# Patient Record
Sex: MALE | Race: WHITE | NOT HISPANIC OR LATINO | Employment: FULL TIME | URBAN - METROPOLITAN AREA
[De-identification: names, ages, dates, MRNs, and addresses within clinical notes are randomized per-mention and may not be internally consistent; named-entity substitution may affect disease eponyms.]

---

## 2017-04-03 ENCOUNTER — TELEPHONE (OUTPATIENT)
Dept: FAMILY MEDICINE | Facility: CLINIC | Age: 42
End: 2017-04-03

## 2017-04-03 ENCOUNTER — HOSPITAL ENCOUNTER (EMERGENCY)
Facility: CLINIC | Age: 42
Discharge: HOME | End: 2017-04-03
Attending: PHYSICIAN ASSISTANT | Admitting: PHYSICIAN ASSISTANT
Payer: COMMERCIAL

## 2017-04-03 VITALS
BODY MASS INDEX: 37.44 KG/M2 | WEIGHT: 247 LBS | OXYGEN SATURATION: 97 % | HEIGHT: 68 IN | HEART RATE: 81 BPM | SYSTOLIC BLOOD PRESSURE: 137 MMHG | TEMPERATURE: 99 F | RESPIRATION RATE: 20 BRPM | DIASTOLIC BLOOD PRESSURE: 83 MMHG

## 2017-04-03 DIAGNOSIS — S06.0X0A CONCUSSION WITHOUT LOSS OF CONSCIOUSNESS, INITIAL ENCOUNTER: ICD-10-CM

## 2017-04-03 PROCEDURE — 99282 EMERGENCY DEPT VISIT SF MDM: CPT

## 2017-04-03 ASSESSMENT — ENCOUNTER SYMPTOMS
WEAKNESS: 0
COLOR CHANGE: 1
NECK PAIN: 0
DECREASED CONCENTRATION: 1
NAUSEA: 0
NUMBNESS: 0
DIZZINESS: 0
FEVER: 0
VOMITING: 0
CONFUSION: 0
BACK PAIN: 0
CHILLS: 0
LIGHT-HEADEDNESS: 0
HEADACHES: 1
WOUND: 1
EYE PAIN: 0
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
## 2017-04-03 NOTE — ED AVS SNAPSHOT
Emergency Department    [address] [facility] [address]    Phone:  [phone]    Fax:  [phone]                                       Gage Carter   MRN: [id number]    Department:   Emergency Department   Date of Visit:  4/3/2017           Patient Information     Date Of Birth          1975        Your diagnoses for this visit were:     Concussion without loss of consciousness, initial encounter        You were seen by Kim Frazier PA-C.      Follow-up Information     Follow up with Lesli Hernandes MD In 3 days.    Specialty:  Family Practice    Contact information:    [address] [address] [phone]          Follow up with  Emergency Department.    Specialty:  EMERGENCY MEDICINE    Why:  If symptoms worsen    Contact information:    [address] [facility] [address] [phone]        Discharge Instructions       Discharge Instructions  Head Injury    You have been seen today for a head injury. You were checked for serious problems, like bleeding on the brain, but these problems cannot always be found right away.  Due to this risk, you should not be alone for 24 hours after your injury.  Follow up with your regular physician in 3-4 days. If you are taking a blood thinner, such as aspirin, Pradaxa  (dabigatran), Coumadin  (warfarin), or Plavix  (clopidogrel), you are at especially high risk for immediate or delayed bleeding, and need to re-check with a physician in 24 hours, or sooner if any of the symptoms below happen.     Return to the Emergency Department if:    You are confused, have amnesia, or you are not acting right.    Your headache gets worse or you start to have a really bad headache even with your recommended treatment plan.    You vomit more than once.    You have a convulsion or seizure.    You have trouble walking.    You have weakness or paralysis in an arm or a leg.    You have  blood or fluid coming from your ears or nose.    You have new symptoms or anything that worries you.    Sleeping:  It is okay for you to sleep, but someone should wake you up as instructed by your doctor, and someone should check on you at your usual time to wake up.     Activity:    Do not drive for at least 24 hours.    Do not drive if you have dizzy spells or trouble concentrating, or remembering things.    Do not return to any contact sports until cleared by your regular doctor.     Follow-up:  It is very important that you make an appointment with your clinic and go to the appointment.  If you do not follow-up with your regular doctor, it may result in missing an important development which could result in permanent injury or disability and/or lasting pain.  If there is any problem keeping your appointment, call your doctor or return to the Emergency Department.    MORE INFORMATION:    Concussion:  A concussion is a minor head injury that may cause temporary problems with the way your brain works.  Some symptoms include:  confusion, amnesia, nausea and vomiting, dizziness, fatigue, memory or concentration problems, irritability and sleep problems.    CT Scans: Your evaluation today may have included a CT scan (CAT scan) to look for things like bleeding or a skull fracture (break).  CT scans involve radiation and too many CT scans can cause serious health problems like cancer, especially in children.  Because of this, your doctor may not have ordered a CT scan today if they think you are at low risk for a serious or life threatening problem.    If you were given a prescription for medicine here today, be sure to read all of the information (including the package insert) that comes with your prescription.  This will include important information about the medicine, its side effects, and any warnings that you need to know about.  The pharmacist who fills the prescription can provide more information and answer  questions you may have about the medicine.  If you have questions or concerns that the pharmacist cannot address, please call or return to the Emergency Department.         Remember that you can always come back to the Emergency Department if you are not able to see your regular doctor in the amount of time listed above, if you get any new symptoms, or if there is anything that worries you.    Concussions  [facility]  What is a concussion?  A concussion is a mild traumatic brain injury (TBI) that occurs from a direct blow, bump or jolt to the head. It can also result from a blow to another part of the body when the force travels up to the head. A concussion can affect coordination, learning, memory and emotions.  Most concussions heal without issue. However, like any other injury, a brain injury should be given time to heal, which includes physical and mental rest (free from mental straining and visual stimuli like video miles and texting).  Signs and symptoms  Common signs and symptoms may include:    Altered mental status, including confusion, inappropriate emotions, agitation or abrupt change in personality    Amnesia/memory problems    Blurred vision, double vision, seeing stars or black spots    Dizziness, poor balance, or unsteadiness    Excessive or persistent headache    Excessive fatigue    Excessive sensitivity to light or loud noise    Feel  in a fog     Loss of consciousness or orientation    Poor balance/coordination    Ringing in ears    Vacant stare/glassy eyed    Vomiting  One of the most severe problems that can occur is bleeding inside the brain. If bleeding or swelling of the brain occurs, pressure in the skull rises and can cause brain injury. Bleeding may develop right after an injury or hours later, so monitoring symptoms is critical. This can be life threatening.    Why do a baseline computer test (ImPACT)?  Neurocognitive tests, such as ImPACT, provide information  about how the brain is responding to injury. ImPACT has two components: a pre- and post-concussion test. The pretest scoring represents one s baseline (normal) brain function.  The test is then repeated post injury. Results of the pre- and post-concussion tests are compared and a care plan is developed. If a pre-test was not completed prior to a concussion, a post-concussion test is still helpful in the assessment of brain function. An ImPACT test should be completed yearly due to the natural maturing of the brain.    What can I expect from the [facility] concussion program? [facility] providers will:    Facilitate completion of baseline tests    Manage concussion symptoms and make recommendations for return to previous activity level    Facilitate post-concussion testing    Refer to other health-care providers, as needed,  including neuropsychologists, neurologists and therapists who specialize in concussion management    When is it safe to return to activity?  A person should be free of symptoms and have returned  to normal sleeping and eating patterns as well as typical concentration levels before resuming activity. Once normal activities have resumed and there are no symptoms at rest, more demanding activities may be tried. Over time, activity will be increased as long as symptoms do not return. A gradual return to activities allows us the opportunity to assess brain healing.  Under no circumstances should anyone return to activity while experiencing concussion signs or symptoms.    For more information contact:  Concussion hotline: [phone]  To schedule an appointment: [phone]  Cost of computer testing (ImPACT)  Pre-concussion testing - $5  Post-concussion testing:    Option 1: $20 includes all post testing; excludes doctor review.    Option 2: [facility] doctor visit, including all post testing and review.      24 Hour Appointment Hotline       To make an appointment at any  [facility] call [phone] ([phone]). If you don't have a family doctor or clinic, we will help you find one. [facility] are conveniently located to serve the needs of you and your family.             Review of your medicines      Our records show that you are taking the medicines listed below. If these are incorrect, please call your family doctor or clinic.        Dose / Directions Last dose taken    simvastatin 10 MG tablet   Commonly known as:  ZOCOR   Dose:  10 mg   Quantity:  90 tablet        Take 1 tablet (10 mg) by mouth At Bedtime   Refills:  3                Orders Needing Specimen Collection     None      Pending Results     No orders found from 4/1/2017 to 4/4/2017.            Pending Culture Results     No orders found from 4/1/2017 to 4/4/2017.             Test Results from your hospital stay            Clinical Quality Measure: Blood Pressure Screening     Your blood pressure was checked while you were in the emergency department today. The last reading we obtained was  BP: 137/83 . Please read the guidelines below about what these numbers mean and what you should do about them.  If your systolic blood pressure (the top number) is less than 120 and your diastolic blood pressure (the bottom number) is less than 80, then your blood pressure is normal. There is nothing more that you need to do about it.  If your systolic blood pressure (the top number) is 120-139 or your diastolic blood pressure (the bottom number) is 80-89, your blood pressure may be higher than it should be. You should have your blood pressure rechecked within a year by a primary care provider.  If your systolic blood pressure (the top number) is 140 or greater or your diastolic blood pressure (the bottom number) is 90 or greater, you may have high blood pressure. High blood pressure is treatable, but if left untreated over time it can put you at risk for heart attack, stroke, or kidney failure. You should have  your blood pressure rechecked by a primary care provider within the next 4 weeks.  If your provider in the emergency department today gave you specific instructions to follow-up with your doctor or provider even sooner than that, you should follow that instruction and not wait for up to 4 weeks for your follow-up visit.        Thank you for choosing [facility]       Thank you for choosing [facility] for your care. Our goal is always to provide you with excellent care. Hearing back from our patients is one way we can continue to improve our services. Please take a few minutes to complete the written survey that you may receive in the mail after you visit with us. Thank you!        [facility] Information     [facility] gives you secure access to your electronic health record. If you see a primary care provider, you can also send messages to your care team and make appointments. If you have questions, please call your primary care clinic.  If you do not have a primary care provider, please call [phone] and they will assist you.        Care EveryWhere ID     This is your Care EveryWhere ID. This could be used by other organizations to access your [facility] medical records  [id number]        After Visit Summary       This is your record. Keep this with you and show to your community pharmacist(s) and doctor(s) at your next visit.
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
## 2017-04-03 NOTE — ED AVS SNAPSHOT
Emergency Department    [address] [place] [address]    Phone:  [phone]    Fax:  [phone]                                       Gage Carter   MRN: [id number]    Department:   Emergency Department   Date of Visit:  4/3/2017           After Visit Summary Signature Page     I have received my discharge instructions, and my questions have been answered. I have discussed any challenges I see with this plan with the nurse or doctor.    ..........................................................................................................................................  Patient/Patient Representative Signature      ..........................................................................................................................................  Patient Representative Print Name and Relationship to Patient    ..................................................               ................................................  Date                                            Time    ..........................................................................................................................................  Reviewed by Signature/Title    ...................................................              ..............................................  Date                                                            Time

## 2017-04-03 NOTE — DISCHARGE INSTRUCTIONS
Discharge Instructions  Head Injury    You have been seen today for a head injury. You were checked for serious problems, like bleeding on the brain, but these problems cannot always be found right away.  Due to this risk, you should not be alone for 24 hours after your injury.  Follow up with your regular physician in 3-4 days. If you are taking a blood thinner, such as aspirin, Pradaxa  (dabigatran), Coumadin  (warfarin), or Plavix  (clopidogrel), you are at especially high risk for immediate or delayed bleeding, and need to re-check with a physician in 24 hours, or sooner if any of the symptoms below happen.     Return to the Emergency Department if:    You are confused, have amnesia, or you are not acting right.    Your headache gets worse or you start to have a really bad headache even with your recommended treatment plan.    You vomit more than once.    You have a convulsion or seizure.    You have trouble walking.    You have weakness or paralysis in an arm or a leg.    You have blood or fluid coming from your ears or nose.    You have new symptoms or anything that worries you.    Sleeping:  It is okay for you to sleep, but someone should wake you up as instructed by your doctor, and someone should check on you at your usual time to wake up.     Activity:    Do not drive for at least 24 hours.    Do not drive if you have dizzy spells or trouble concentrating, or remembering things.    Do not return to any contact sports until cleared by your regular doctor.     Follow-up:  It is very important that you make an appointment with your clinic and go to the appointment.  If you do not follow-up with your regular doctor, it may result in missing an important development which could result in permanent injury or disability and/or lasting pain.  If there is any problem keeping your appointment, call your doctor or return to the Emergency Department.    MORE INFORMATION:    Concussion:  A concussion is a minor head  injury that may cause temporary problems with the way your brain works.  Some symptoms include:  confusion, amnesia, nausea and vomiting, dizziness, fatigue, memory or concentration problems, irritability and sleep problems.    CT Scans: Your evaluation today may have included a CT scan (CAT scan) to look for things like bleeding or a skull fracture (break).  CT scans involve radiation and too many CT scans can cause serious health problems like cancer, especially in children.  Because of this, your doctor may not have ordered a CT scan today if they think you are at low risk for a serious or life threatening problem.    If you were given a prescription for medicine here today, be sure to read all of the information (including the package insert) that comes with your prescription.  This will include important information about the medicine, its side effects, and any warnings that you need to know about.  The pharmacist who fills the prescription can provide more information and answer questions you may have about the medicine.  If you have questions or concerns that the pharmacist cannot address, please call or return to the Emergency Department.         Remember that you can always come back to the Emergency Department if you are not able to see your regular doctor in the amount of time listed above, if you get any new symptoms, or if there is anything that worries you.    Concussions  [facility]  What is a concussion?  A concussion is a mild traumatic brain injury (TBI) that occurs from a direct blow, bump or jolt to the head. It can also result from a blow to another part of the body when the force travels up to the head. A concussion can affect coordination, learning, memory and emotions.  Most concussions heal without issue. However, like any other injury, a brain injury should be given time to heal, which includes physical and mental rest (free from mental straining and visual stimuli  like video miles and texting).  Signs and symptoms  Common signs and symptoms may include:    Altered mental status, including confusion, inappropriate emotions, agitation or abrupt change in personality    Amnesia/memory problems    Blurred vision, double vision, seeing stars or black spots    Dizziness, poor balance, or unsteadiness    Excessive or persistent headache    Excessive fatigue    Excessive sensitivity to light or loud noise    Feel  in a fog     Loss of consciousness or orientation    Poor balance/coordination    Ringing in ears    Vacant stare/glassy eyed    Vomiting  One of the most severe problems that can occur is bleeding inside the brain. If bleeding or swelling of the brain occurs, pressure in the skull rises and can cause brain injury. Bleeding may develop right after an injury or hours later, so monitoring symptoms is critical. This can be life threatening.    Why do a baseline computer test (ImPACT)?  Neurocognitive tests, such as ImPACT, provide information about how the brain is responding to injury. ImPACT has two components: a pre- and post-concussion test. The pretest scoring represents one s baseline (normal) brain function.  The test is then repeated post injury. Results of the pre- and post-concussion tests are compared and a care plan is developed. If a pre-test was not completed prior to a concussion, a post-concussion test is still helpful in the assessment of brain function. An ImPACT test should be completed yearly due to the natural maturing of the brain.    What can I expect from the [facility] [facility] concussion program? [facility] ([facility]) providers will:    Facilitate completion of baseline tests    Manage concussion symptoms and make recommendations for return to previous activity level    Facilitate post-concussion testing    Refer to other health-care providers, as needed,  including neuropsychologists, neurologists and therapists who specialize in  concussion management    When is it safe to return to activity?  A person should be free of symptoms and have returned  to normal sleeping and eating patterns as well as typical concentration levels before resuming activity. Once normal activities have resumed and there are no symptoms at rest, more demanding activities may be tried. Over time, activity will be increased as long as symptoms do not return. A gradual return to activities allows us the opportunity to assess brain healing.  Under no circumstances should anyone return to activity while experiencing concussion signs or symptoms.    For more information contact:  Concussion hotline: [phone]  To schedule an appointment: [phone]  Cost of computer testing (ImPACT)  Pre-concussion testing - $5  Post-concussion testing:    Option 1: $20 includes all post testing; excludes doctor review.    Option 2: [facility] doctor visit, including all post testing and review.

## 2017-04-06 ENCOUNTER — TELEPHONE (OUTPATIENT)
Dept: FAMILY MEDICINE | Facility: CLINIC | Age: 42
End: 2017-04-06

## 2017-04-06 NOTE — TELEPHONE ENCOUNTER
Patient called requesting advise if ok to wait next week for hospital follow up. Notes he has been having trouble remembering some things like door code at work. Advise he schedule appt this week for follow up. appt was scheduled.

## 2017-04-07 ENCOUNTER — OFFICE VISIT (OUTPATIENT)
Dept: FAMILY MEDICINE | Facility: CLINIC | Age: 42
End: 2017-04-07
Payer: COMMERCIAL

## 2017-04-07 ENCOUNTER — TELEPHONE (OUTPATIENT)
Dept: FAMILY MEDICINE | Facility: CLINIC | Age: 42
End: 2017-04-07

## 2017-04-07 VITALS
WEIGHT: 241 LBS | HEART RATE: 60 BPM | BODY MASS INDEX: 36.53 KG/M2 | HEIGHT: 68 IN | DIASTOLIC BLOOD PRESSURE: 82 MMHG | TEMPERATURE: 98.2 F | SYSTOLIC BLOOD PRESSURE: 120 MMHG

## 2017-04-07 DIAGNOSIS — S05.91XS RIGHT EYE INJURY, SEQUELA: ICD-10-CM

## 2017-04-07 DIAGNOSIS — F07.81 POSTCONCUSSION SYNDROME: Primary | ICD-10-CM

## 2017-04-07 DIAGNOSIS — S06.0X0S CONCUSSION WITHOUT LOSS OF CONSCIOUSNESS, SEQUELA (H): ICD-10-CM

## 2017-04-07 PROCEDURE — 99214 OFFICE O/P EST MOD 30 MIN: CPT | Performed by: PHYSICIAN ASSISTANT

## 2017-04-07 NOTE — NURSING NOTE
"Chief Complaint   Patient presents with     ER F/U       Initial /82  Pulse 60  Temp 98.2  F (36.8  C) (Tympanic)  Ht 5' 8\" (1.727 m)  Wt 241 lb (109.3 kg)  BMI 36.64 kg/m2 Estimated body mass index is 36.64 kg/(m^2) as calculated from the following:    Height as of this encounter: 5' 8\" (1.727 m).    Weight as of this encounter: 241 lb (109.3 kg).  BP completed using cuff size: large    Health Maintenance Due   Topic Date Due     PHQ-9 Q6 MONTHS (NO INBASKET)  01/13/2017         Kya Mackay MA 4/07/17        "

## 2017-04-07 NOTE — TELEPHONE ENCOUNTER
Patient called requesting advise on work restrictions. After pt reviewed provider note, he asked nurse to disregard message. He will keep provider's note as written.

## 2017-04-07 NOTE — MR AVS SNAPSHOT
After Visit Summary   4/7/2017    Gage Carter    MRN: [id number]           Patient Information     Date Of Birth          1975        Visit Information        Provider Department      4/7/2017 8:50 AM Ana Paredes PA-C [facility]        Today's Diagnoses     Right eye injury, sequela    -  1    Postconcussion syndrome        Concussion without loss of consciousness, sequela (H)           Follow-ups after your visit        Additional Services     CONCUSSION  REFERRAL       [facility] is referring you to the Concussion  service at [facility].      The  Representative will assist you in the coordination of your concussion care as prescribed by your physician.    The  Representative will contact you within one business day, or you may contact the  Representative at [phone].    Referral Options:  Rehab Services: Occupational Therapy, Evaluate and Treat    Coverage of these services are subject to the terms and limitations of your health insurance plan.  Please call member services at your health plan with any benefit or coverage questions.     If X-rays, CT or MRI's have been performed, please contact the facility where they were done, to arrange for  prior to your scheduled appointment.  Please bring this referral request to your appointment and present it to your specialist.            OPHTHALMOLOGY ADULT REFERRAL       Your provider has referred you to: [facility]: Eye [facility] [phone]   [email or web address]    Please be aware that coverage of these services is subject to the terms and limitations of your health insurance plan.  Call member services at your health plan with any benefit or coverage questions.      Please bring the following with you to your appointment:    (1) Any X-Rays, CTs or MRIs which have been  "performed.  Contact the facility where they were done to arrange for  prior to your scheduled appointment.    (2) List of current medications  (3) This referral request   (4) Any documents/labs given to you for this referral                  Who to contact     If you have questions or need follow up information about today's clinic visit or your schedule please contact [facility] directly at [phone].  Normal or non-critical lab and imaging results will be communicated to you by [email or web address], letter or phone within 4 business days after the clinic has received the results. If you do not hear from us within 7 days, please contact the clinic through Be[employer] Inter[employer] or phone. If you have a critical or abnormal lab result, we will notify you by phone as soon as possible.  Submit refill requests through Zeomatrix or call your pharmacy and they will forward the refill request to us. Please allow 3 business days for your refill to be completed.          Additional Information About Your Visit        [email or web address] Information     Zeomatrix gives you secure access to your electronic health record. If you see a primary care provider, you can also send messages to your care team and make appointments. If you have questions, please call your primary care clinic.  If you do not have a primary care provider, please call [phone] and they will assist you.        Care EveryWhere ID     This is your Care EveryWhere ID. This could be used by other organizations to access your [facility] medical records  [id number]        Your Vitals Were     Pulse Temperature Height BMI (Body Mass Index)          60 98.2  F (36.8  C) (Tympanic) 5' 8\" (1.727 m) 36.64 kg/m2         Blood Pressure from Last 3 Encounters:   04/07/17 120/82   04/03/17 137/83   07/13/16 110/64    Weight from Last 3 Encounters:   04/07/17 241 lb (109.3 kg)   04/03/17 247 lb (112 kg)   07/13/16 224 lb (101.6 kg)              We Performed the " Following     CONCUSSION  REFERRAL     OPHTHALMOLOGY ADULT REFERRAL        Primary Care Provider Office Phone # Fax #    Lesli Hernandes [phone]       [address] [address]        Thank you!     Thank you for choosing [facility]  for your care. Our goal is always to provide you with excellent care. Hearing back from our patients is one way we can continue to improve our services. Please take a few minutes to complete the written survey that you may receive in the mail after your visit with us. Thank you!             Your Updated Medication List - Protect others around you: Learn how to safely use, store and throw away your medicines at www.disposemymeds.org.          This list is accurate as of: 4/7/17  9:37 AM.  Always use your most recent med list.                   Brand Name Dispense Instructions for use    simvastatin 10 MG tablet    ZOCOR    90 tablet    Take 1 tablet (10 mg) by mouth At Bedtime

## 2017-04-07 NOTE — LETTER
[address]  [address]  [facility] [address]  Phone: [phone]  Fax: [phone]    April 7, 2017        Gage Carter  [address] [place] [address]          To whom it may concern:    RE: Gage Carter    Patient was seen and treated today at our clinic.    Patient to remain off work through 4/16/2017.    Upon return to work on 4/17/2017, the following restrictions are in place:  - No more than 30 consecutive minutes of work, followed by 30 consecutive minutes of rest (no screen time or reading, quiet room).  - No extra-curricular activities or attending events after normal work hours  - Patient may require additional assistance to complete reading or screen time tasks (i.e delegating work to others, deadline extension) so he doesn't exceed reading/screen time for 30 consecutive minutes.    Next assessment planned the week of 4/24/2017, work restrictions to be updated at that time.    Please contact me for questions or concerns.      Sincerely,        Ana Paredes PA-C
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
## 2017-04-11 ENCOUNTER — MYC MEDICAL ADVICE (OUTPATIENT)
Dept: FAMILY MEDICINE | Facility: CLINIC | Age: 42
End: 2017-04-11

## 2017-04-12 ENCOUNTER — HOSPITAL ENCOUNTER (OUTPATIENT)
Dept: OCCUPATIONAL THERAPY | Facility: CLINIC | Age: 42
Setting detail: THERAPIES SERIES
End: 2017-04-12
Attending: PHYSICIAN ASSISTANT
Payer: COMMERCIAL

## 2017-04-12 ENCOUNTER — OFFICE VISIT (OUTPATIENT)
Dept: OPHTHALMOLOGY | Facility: CLINIC | Age: 42
End: 2017-04-12
Attending: OPHTHALMOLOGY
Payer: COMMERCIAL

## 2017-04-12 DIAGNOSIS — F07.81 CONCUSSION SYNDROME: Primary | ICD-10-CM

## 2017-04-12 DIAGNOSIS — S05.91XA: ICD-10-CM

## 2017-04-12 PROCEDURE — 92083 EXTENDED VISUAL FIELD XM: CPT | Mod: ZF | Performed by: OPHTHALMOLOGY

## 2017-04-12 PROCEDURE — 99212 OFFICE O/P EST SF 10 MIN: CPT | Mod: ZF

## 2017-04-12 PROCEDURE — 40000768 ZZHC STATISTIC OT CONCUSSION VISIT: Performed by: OCCUPATIONAL THERAPIST

## 2017-04-12 PROCEDURE — 97535 SELF CARE MNGMENT TRAINING: CPT | Mod: GO | Performed by: OCCUPATIONAL THERAPIST

## 2017-04-12 ASSESSMENT — VISUAL ACUITY
OD_SC+: +2
OD_SC: 20/30
OS_SC: 20/20
METHOD: SNELLEN - LINEAR
OD_PH_SC: 20/20-2

## 2017-04-12 ASSESSMENT — TONOMETRY
OS_IOP_MMHG: 18
IOP_METHOD: TONOPEN
OD_IOP_MMHG: 22

## 2017-04-12 ASSESSMENT — CONF VISUAL FIELD
METHOD: COUNTING FINGERS
OS_NORMAL: 1
OD_NORMAL: 1

## 2017-04-12 ASSESSMENT — SLIT LAMP EXAM - LIDS: COMMENTS: NORMAL

## 2017-04-12 ASSESSMENT — CUP TO DISC RATIO
OS_RATIO: 0.3
OD_RATIO: 0.3

## 2017-04-12 ASSESSMENT — EXTERNAL EXAM - RIGHT EYE: OD_EXAM: NORMAL

## 2017-04-12 ASSESSMENT — EXTERNAL EXAM - LEFT EYE: OS_EXAM: NORMAL
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
## 2017-04-12 NOTE — MR AVS SNAPSHOT
After Visit Summary   4/12/2017    Gage Carter    MRN: [id number]           Patient Information     Date Of Birth          1975        Visit Information        Provider Department      4/12/2017 1:00 PM Emily Iverson MD [facility]        Today's Diagnoses     Concussion syndrome    -  1    Trauma to right eye           Follow-ups after your visit        Follow-up notes from your care team     Return if symptoms worsen or fail to improve.      Your next 10 appointments already scheduled     Apr 28, 2017  9:30 AM CDT   [facility] with Ana Paredes PA-C   [facility]    [address] [place] [address] [phone]              Who to contact     Please call your clinic at [phone] to:    Ask questions about your health    Make or cancel appointments    Discuss your medicines    Learn about your test results    Speak to your doctor   If you have compliments or concerns about an experience at your clinic, or if you wish to file a complaint, please contact [facility] Patient Relations at [phone] or email us at [email or web address]         Additional Information About Your Visit        MyChart Information     DeepDyvet gives you secure access to your electronic health record. If you see a primary care provider, you can also send messages to your care team and make appointments. If you have questions, please call your primary care clinic.  If you do not have a primary care provider, please call [phone] and they will assist you.      DeepDyvet is an electronic gateway that provides easy, online access to your medical records. With PublicBeta, you can request a clinic appointment, read your test results, renew a prescription or communicate with your care team.     To access your existing account, please contact your [facility] or call [phone] for assistance.        Care EveryWhere ID     This is your Care EveryWhere ID. This could be used by other organizations to access your [facility] medical records  [id number]         Blood Pressure from Last 3 Encounters:   04/07/17 120/82   04/03/17 137/83   07/13/16 110/64    Weight from Last 3 Encounters:   04/07/17 109.3 kg (241 lb)   04/03/17 112 kg (247 lb)   07/13/16 101.6 kg (224 lb)              We Performed the Following     Glaucoma Top OU        Primary Care Provider Office Phone # Fax #    Lesli Hernandes [phone]       [address] [address]        Thank you!     Thank you for choosing EYE CLINIC  for your care. Our goal is always to provide you with excellent care. Hearing back from our patients is one way we can continue to improve our services. Please take a few minutes to complete the written survey that you may receive in the mail after your visit with us. Thank you!             Your Updated Medication List - Protect others around you: Learn how to safely use, store and throw away your medicines at www.disposemymeds.org.          This list is accurate as of: 4/12/17  2:37 PM.  Always use your most recent med list.                   Brand Name Dispense Instructions for use    simvastatin 10 MG tablet    ZOCOR    90 tablet    Take 1 tablet (10 mg) by mouth At Bedtime
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
## 2017-04-14 ENCOUNTER — MYC MEDICAL ADVICE (OUTPATIENT)
Dept: FAMILY MEDICINE | Facility: CLINIC | Age: 42
End: 2017-04-14

## 2017-04-14 NOTE — TELEPHONE ENCOUNTER
Thanks Linsey.  Noted, looks like this is all for JOSE Paredes and just an update for me.  I'll forward to her as well.  No need for follow up.  Thanks,  Lesli Hernandes MD

## 2017-04-17 ENCOUNTER — TELEPHONE (OUTPATIENT)
Dept: FAMILY MEDICINE | Facility: CLINIC | Age: 42
End: 2017-04-17

## 2017-04-17 NOTE — TELEPHONE ENCOUNTER
Patient is calling regarding the recent accommodations that he set with Ana at the last appointment he had with her.     States 'the on for 30 minutes off for 30 minutes does not work for me, the environment is entirely too noisy, I found myself in a fog and I had to go out to my car to recover, would like to talk to Ana about this and see if there is a different accomodation that can be made'   Patient was hyper verbal and wanted to be seen earlier than the 25th. Scheduled an appointment for him to see Ana on Wednesday.    Tati Yoon RN  04/17/17  12:42 PM

## 2017-04-18 ENCOUNTER — MYC MEDICAL ADVICE (OUTPATIENT)
Dept: FAMILY MEDICINE | Facility: CLINIC | Age: 42
End: 2017-04-18

## 2017-04-18 NOTE — TELEPHONE ENCOUNTER
Please see My Chart message below and advise as appropriate.    Linsey Rae RN  [facility] [employer]

## 2017-04-19 ENCOUNTER — MYC MEDICAL ADVICE (OUTPATIENT)
Dept: FAMILY MEDICINE | Facility: CLINIC | Age: 42
End: 2017-04-19

## 2017-04-19 ENCOUNTER — OFFICE VISIT (OUTPATIENT)
Dept: FAMILY MEDICINE | Facility: CLINIC | Age: 42
End: 2017-04-19
Payer: COMMERCIAL

## 2017-04-19 ENCOUNTER — TELEPHONE (OUTPATIENT)
Dept: FAMILY MEDICINE | Facility: CLINIC | Age: 42
End: 2017-04-19

## 2017-04-19 VITALS
DIASTOLIC BLOOD PRESSURE: 72 MMHG | SYSTOLIC BLOOD PRESSURE: 122 MMHG | HEART RATE: 62 BPM | OXYGEN SATURATION: 96 % | HEIGHT: 68 IN | RESPIRATION RATE: 16 BRPM | WEIGHT: 243 LBS | TEMPERATURE: 97.1 F | BODY MASS INDEX: 36.83 KG/M2

## 2017-04-19 DIAGNOSIS — F33.1 MODERATE EPISODE OF RECURRENT MAJOR DEPRESSIVE DISORDER (H): ICD-10-CM

## 2017-04-19 DIAGNOSIS — S06.0X0S CONCUSSION WITHOUT LOSS OF CONSCIOUSNESS, SEQUELA (H): Primary | ICD-10-CM

## 2017-04-19 DIAGNOSIS — F41.1 GENERALIZED ANXIETY DISORDER: ICD-10-CM

## 2017-04-19 PROCEDURE — 99215 OFFICE O/P EST HI 40 MIN: CPT | Performed by: PHYSICIAN ASSISTANT

## 2017-04-19 NOTE — NURSING NOTE
"No chief complaint on file.      Initial /72  Pulse 62  Temp 97.1  F (36.2  C) (Tympanic)  Resp 16  Ht 5' 8\" (1.727 m)  Wt 243 lb (110.2 kg)  SpO2 96%  BMI 36.95 kg/m2 Estimated body mass index is 36.95 kg/(m^2) as calculated from the following:    Height as of this encounter: 5' 8\" (1.727 m).    Weight as of this encounter: 243 lb (110.2 kg).  Medication Reconciliation: complete   .Mer BARNETT      "
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
## 2017-04-19 NOTE — MR AVS SNAPSHOT
After Visit Summary   4/19/2017    Gage Carter    MRN: [id number]           Patient Information     Date Of Birth          1975        Visit Information        Provider Department      4/19/2017 7:10 AM Ana Paredes PA-C [facility]        Today's Diagnoses     Concussion without loss of consciousness, sequela (H)    -  1       Follow-ups after your visit        Your next 10 appointments already scheduled     Apr 25, 2017  2:30 PM CDT   Treatment 45 with Aggie Parrish OT   [facility]    [address] [place] [address] [address]  [facility] [address]   [phone]            Apr 28, 2017  9:30 AM CDT   SHORT with Ana Paredes PA-C   [facility] ([facility])    [address]  [address]  [facility] [address]   [phone]            May 01, 2017  2:30 PM CDT   Treatment 45 with Aggie Parrish OT   [facility] ([facility])    [address] [place] [address] [address]  [facility] [address]   [phone]            May 08, 2017  2:30 PM CDT   Treatment 45 with Aggie Parrish OT   [facility] ([facility])    [address] [place] [address] [address]  [facility] [address]   [phone]            May 15, 2017  2:30 PM CDT   Treatment 45 with Aggie Parrish [facility]   [facility]    [address] [place] [address]  [facility] [address]   [phone]              Who to contact     If you have questions or need follow up information about today's clinic visit or your schedule please contact [facility] directly at [phone].  Normal or non-critical lab and imaging results will be communicated to you by MyChart, letter or phone within 4 business days after the clinic has received the results. If you do  "not hear from us within 7 days, please contact the clinic through [employer] or phone. If you have a critical or abnormal lab result, we will notify you by phone as soon as possible.  Submit refill requests through [employer] or call your pharmacy and they will forward the refill request to us. Please allow 3 business days for your refill to be completed.          Additional Information About Your Visit        [employer] Information     [employer] gives you secure access to your electronic health record. If you see a primary care provider, you can also send messages to your care team and make appointments. If you have questions, please call your primary care clinic.  If you do not have a primary care provider, please call [phone] and they will assist you.        Care EveryWhere ID     This is your Care EveryWhere ID. This could be used by other organizations to access your [place] medical records  [id number]        Your Vitals Were     Pulse Temperature Respirations Height Pulse Oximetry BMI (Body Mass Index)    62 97.1  F (36.2  C) (Tympanic) 16 5' 8\" (1.727 m) 96% 36.95 kg/m2       Blood Pressure from Last 3 Encounters:   04/19/17 122/72   04/07/17 120/82   04/03/17 137/83    Weight from Last 3 Encounters:   04/19/17 243 lb (110.2 kg)   04/07/17 241 lb (109.3 kg)   04/03/17 247 lb (112 kg)              Today, you had the following     No orders found for display       Primary Care Provider Office Phone # Fax #    Lesli Hernandes [phone]       [address] [address]        Thank you!     Thank you for choosing [facility]  for your care. Our goal is always to provide you with excellent care. Hearing back from our patients is one way we can continue to improve our services. Please take a few minutes to complete the written survey that you may receive in the mail after your visit with us. Thank you!             Your Updated " Medication List - Protect others around you: Learn how to safely use, store and throw away your medicines at www.disposemymeds.org.          This list is accurate as of: 4/19/17  7:48 AM.  Always use your most recent med list.                   Brand Name Dispense Instructions for use    simvastatin 10 MG tablet    ZOCOR    90 tablet    Take 1 tablet (10 mg) by mouth At Bedtime

## 2017-04-19 NOTE — TELEPHONE ENCOUNTER
Reason for Call: Request for an order or referral:    Order or referral being requested: referral to concussion care clinic    Date needed: as soon as possible    Has the patient been seen by the PCP for this problem? YES    Additional comments: Pt had appt with JOSE Paredes today and talked to Dr Hernandes via [employer].    Phone number Patient can be reached at:  Cell number on file:    Telephone Information:   Mobile [phone]       Best Time:  any    Can we leave a detailed message on this number?  YES    Call taken on 4/19/2017 at 8:23 AM by SAHARA ZHNAG

## 2017-04-20 ASSESSMENT — PATIENT HEALTH QUESTIONNAIRE - PHQ9: SUM OF ALL RESPONSES TO PHQ QUESTIONS 1-9: 11

## 2017-04-21 ENCOUNTER — MYC MEDICAL ADVICE (OUTPATIENT)
Dept: FAMILY MEDICINE | Facility: CLINIC | Age: 42
End: 2017-04-21

## 2017-04-24 ENCOUNTER — MYC MEDICAL ADVICE (OUTPATIENT)
Dept: FAMILY MEDICINE | Facility: CLINIC | Age: 42
End: 2017-04-24

## 2017-04-25 NOTE — TELEPHONE ENCOUNTER
Hi RNs:  Could you call Gage and kindly let him know that he needs an appointment to discuss this?  It's too much info for us to manage from [employer] - [employer] is really meant for quick messages and communication only.  Or if he was just sending as an FYI you can tell him that I saw his message.  Thanks,  Lesli Hernandes MD

## 2017-04-25 NOTE — TELEPHONE ENCOUNTER
Called patient and spoke to him he is meeting with the concussion clinic tomorrow.  Tati Yoon RN  04/25/17  4:16 PM

## 2017-04-26 ENCOUNTER — OFFICE VISIT (OUTPATIENT)
Dept: SURGERY | Facility: CLINIC | Age: 42
End: 2017-04-26

## 2017-04-26 VITALS
HEIGHT: 68 IN | SYSTOLIC BLOOD PRESSURE: 136 MMHG | HEART RATE: 73 BPM | BODY MASS INDEX: 36.78 KG/M2 | DIASTOLIC BLOOD PRESSURE: 86 MMHG | OXYGEN SATURATION: 97 % | WEIGHT: 242.7 LBS | TEMPERATURE: 98.7 F

## 2017-04-26 DIAGNOSIS — S06.0X0A CONCUSSION, WITHOUT LOSS OF CONSCIOUSNESS, INITIAL ENCOUNTER: Primary | ICD-10-CM

## 2017-04-26 ASSESSMENT — ANXIETY QUESTIONNAIRES
2. NOT BEING ABLE TO STOP OR CONTROL WORRYING: NOT AT ALL
6. BECOMING EASILY ANNOYED OR IRRITABLE: NEARLY EVERY DAY
5. BEING SO RESTLESS THAT IT IS HARD TO SIT STILL: NOT AT ALL
GAD7 TOTAL SCORE: 8
1. FEELING NERVOUS, ANXIOUS, OR ON EDGE: SEVERAL DAYS
3. WORRYING TOO MUCH ABOUT DIFFERENT THINGS: SEVERAL DAYS
IF YOU CHECKED OFF ANY PROBLEMS ON THIS QUESTIONNAIRE, HOW DIFFICULT HAVE THESE PROBLEMS MADE IT FOR YOU TO DO YOUR WORK, TAKE CARE OF THINGS AT HOME, OR GET ALONG WITH OTHER PEOPLE: VERY DIFFICULT
7. FEELING AFRAID AS IF SOMETHING AWFUL MIGHT HAPPEN: SEVERAL DAYS

## 2017-04-26 ASSESSMENT — PAIN SCALES - GENERAL: PAINLEVEL: MODERATE PAIN (4)

## 2017-04-26 ASSESSMENT — PATIENT HEALTH QUESTIONNAIRE - PHQ9: 5. POOR APPETITE OR OVEREATING: MORE THAN HALF THE DAYS
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
## 2017-04-26 NOTE — MR AVS SNAPSHOT
After Visit Summary   4/26/2017    Gage Carter    MRN: [id number]           Patient Information     Date Of Birth          1975        Visit Information        Provider Department      4/26/2017 4:00 PM Oralia Barlow NP [facility]        Today's Diagnoses     Concussion, without loss of consciousness, initial encounter    -  1      Care Instructions    GENERAL ADVICE  ~~ Avoid activities that trigger your symptoms.  Stop your activity  as soon as you feel the symptoms coming on, or earlier if you feel your symptoms coming on.  ~~ Do not try to push through symptoms or they may get worse or take longer to go away.  ~~ Allow yourself more time for activity   ~~ Drink plenty of water throughout the day (8-10 glasses per day).   ~~ Avoid alcohol and caffeine  ~~ Write things down.  At home, at work, whenever there is something that you should remember, even it is simple.    SLEEP  ~~ You need to sleep.   ~~ If your headaches are keeping you awake take your ibuprofen and tylenol right before bedtime~  ~~ Attempt for 8 hours of sleep a night. If you are having issues sleeping at night, avoid napping during the day.  ~~ Rest (eyes closed, dark room)  as frequently as needed to let your symptoms go away    SCREEN  ~ Look into justgetflux.com to adjust your screen resolutions~  ~ Increase font.   ~ limit screen activities including computer, TV, e-readers and phones.  ~ Avoid reading if it increases your symptoms.  Audio- books are a great option often available at your public library.    HEADACHE  ~ Take your tylenol (1000 mg) three times a day for now, do not wait until you have a headache~  ~ Take your ibuprofen (600 mg) three times a day now, do not wait until you have a headache (take with food)~  Light sensitivities:   ~~ Avoid florescent lighting when possible  ~~ Always wear sunglasses outside and even wear them indoors if it is helpful.  ~~ No night driving.   Sound  sensitivities:  ~ Avoid crowded areas  ~ Avoid multiple conversations at the same time around you  ~ Avoid loud music or loud sounds    NECK PAIN  ~ Ice or Heat are good.   ~ Massage is ok if it doesn't trigger more symptoms.   ~ Gentle stretches are good.     DIZZINESS  ~ Minimal daytime driving, no night driving, no drive way driving.   ~ Keep a chair at the side of the bed to help get steady prior to getting up and going to the bathroom.   ~ No climbing heights or using ladders or stepstools.    FATIGUE  ~ Go on walks, clean the house, gentle activities, but when you get fatigued, stop and rest.     ANXIETY OR MOOD SWINGS:  ~~ If you are irritable or anxious, take a break in a quiet room.        You have been referred for vision therapy. Please contact the clinic to make an appointment.   Please have the clinic release records to us in order to see how therapy is progressing.     [facility]   [address] [place], [address]  [phone]   [email or web address]     [facility]   [address],  [place], [place], [address] [phone]   [email or web address]     [facility]   [address]  [place], [address] [phone])   [email or web address]     [facility]   [address],  [place], [place], [address]  [phone]   [email or web address]     [facility]   [address], [address]  [place] [address] [phone]  [email or web address]                         Follow-ups after your visit        Additional Services     CONCUSSION  REFERRAL       [facility] is referring you to the Concussion  service at [facility].      The  Representative will assist you in the coordination of your concussion care as prescribed by your physician.    The   Representative will contact you within one business day, or you may contact the [facility] Representative at [phone].    Referral Options:  Non-Sports related concussion management    Please f/up in clinic in 4-6 weeks.  Please refer to occupational therapy.      Coverage of these services are subject to the terms and limitations of your health insurance plan.  Please call member services at your health plan with any benefit or coverage questions.     If X-rays, CT or MRI's have been performed, please contact the facility where they were done, to arrange for  prior to your scheduled appointment.  Please bring this referral request to your appointment and present it to your specialist.            OPTOMETRY REFERRAL       Vision therapy referral  Please be aware that coverage of these services is subject to the terms and limitations of your health insurance plan.  Call member services at your health plan with any benefit or coverage questions.      Please bring the following with you to your appointment:    (1) Any X-Rays, CTs or MRIs which have been performed.  Contact the facility where they were done to arrange for  prior to your scheduled appointment.    (2) List of current medications  (3) This referral request   (4) Any documents/labs given to you for this referral                  Your next 10 appointments already scheduled     Apr 28, 2017  9:30 AM CDT   SHORT with Ana Paredes PA-C   [facility] ([facility])    [address]  [facility] [address]   [phone]            May 01, 2017  2:30 PM CDT   Treatment 45 with [facility] ([facility])    [address]  [address]  [facility] [address]   [phone]            May 08, 2017  2:30 PM CDT   Treatment 45 with [facility]  "([facility])    [address] [place] [address]  [address]  [place] [id number] [phone]            May 15, 2017  2:30 PM CDT   Treatment 45 with [facility] ([facility])    [address] [place] [address]  [address]  [place] [phone]              Who to contact     Please call your clinic at [phone] to:    Ask questions about your health    Make or cancel appointments    Discuss your medicines    Learn about your test results    Speak to your doctor   If you have compliments or concerns about an experience at your clinic, or if you wish to file a complaint, please contact [facility] Patient Relations at [phone] or email us at [email or web address]         Additional Information About Your Visit        [employer]-ID Information     K[employer]obi gives you secure access to your electronic health record. If you see a primary care provider, you can also send messages to your care team and make appointments. If you have questions, please call your primary care clinic.  If you do not have a primary care provider, please call [phone] and they will assist you.      K[employer]Mobi is an electronic gateway that provides easy, online access to your medical records. With [employer], you can request a clinic appointment, read your test results, renew a prescription or communicate with your care team.     To access your existing account, please contact your [facility] or call [phone] for assistance.        Care EveryWhere ID     This is your Care EveryWhere ID. This could be used by other organizations to access your [facility] medical records  [id number]        Your Vitals Were     Pulse Temperature Height Pulse Oximetry BMI (Body Mass Index)       73 98.7  F (37.1  C) (Oral) 5' 8\" 97% 36.9 kg/m2        Blood Pressure from Last 3 Encounters:   04/26/17 136/86   04/19/17 122/72   04/07/17 120/82    " Weight from Last 3 Encounters:   04/26/17 242 lb 11.2 oz   04/19/17 243 lb   04/07/17 241 lb              We Performed the Following     CONCUSSION  REFERRAL     OPTOMETRY REFERRAL        Primary Care Provider Office Phone # Fax #    Lesli Hernandes [phone]       [address] [address]        Thank you!     Thank you for choosing [facility]  for your care. Our goal is always to provide you with excellent care. Hearing back from our patients is one way we can continue to improve our services. Please take a few minutes to complete the written survey that you may receive in the mail after your visit with us. Thank you!             Your Updated Medication List - Protect others around you: Learn how to safely use, store and throw away your medicines at www.disposemymeds.org.          This list is accurate as of: 4/26/17  5:04 PM.  Always use your most recent med list.                   Brand Name Dispense Instructions for use    simvastatin 10 MG tablet    ZOCOR    90 tablet    Take 1 tablet (10 mg) by mouth At Bedtime
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
## 2017-04-26 NOTE — NURSING NOTE
"Chief Complaint   Patient presents with     Clinic Care Coordination - Initial     new       Vitals:    04/26/17 1553   BP: 136/86   Pulse: 73   Temp: 98.7  F (37.1  C)   TempSrc: Oral   SpO2: 97%   Weight: 242 lb 11.2 oz   Height: 5' 8\"       Body mass index is 36.9 kg/(m^2).      Jessica RAYA LPN                       "
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
## 2017-04-26 NOTE — PATIENT INSTRUCTIONS
GENERAL ADVICE  ~~ Avoid activities that trigger your symptoms.  Stop your activity  as soon as you feel the symptoms coming on, or earlier if you feel your symptoms coming on.  ~~ Do not try to push through symptoms or they may get worse or take longer to go away.  ~~ Allow yourself more time for activity   ~~ Drink plenty of water throughout the day (8-10 glasses per day).   ~~ Avoid alcohol and caffeine  ~~ Write things down.  At home, at work, whenever there is something that you should remember, even it is simple.    SLEEP  ~~ You need to sleep.   ~~ If your headaches are keeping you awake take your ibuprofen and tylenol right before bedtime~  ~~ Attempt for 8 hours of sleep a night. If you are having issues sleeping at night, avoid napping during the day.  ~~ Rest (eyes closed, dark room)  as frequently as needed to let your symptoms go away    SCREEN  ~ Look into justgetflux.com to adjust your screen resolutions~  ~ Increase font.   ~ limit screen activities including computer, TV, e-readers and phones.  ~ Avoid reading if it increases your symptoms.  Audio- books are a great option often available at your public library.    HEADACHE  ~ Take your tylenol (1000 mg) three times a day for now, do not wait until you have a headache~  ~ Take your ibuprofen (600 mg) three times a day now, do not wait until you have a headache (take with food)~  Light sensitivities:   ~~ Avoid florescent lighting when possible  ~~ Always wear sunglasses outside and even wear them indoors if it is helpful.  ~~ No night driving.   Sound sensitivities:  ~ Avoid crowded areas  ~ Avoid multiple conversations at the same time around you  ~ Avoid loud music or loud sounds    NECK PAIN  ~ Ice or Heat are good.   ~ Massage is ok if it doesn't trigger more symptoms.   ~ Gentle stretches are good.     DIZZINESS  ~ Minimal daytime driving, no night driving, no drive way driving.   ~ Keep a chair at the side of the bed to help get steady prior  to getting up and going to the bathroom.   ~ No climbing heights or using ladders or stepstools.    FATIGUE  ~ Go on walks, clean the house, gentle activities, but when you get fatigued, stop and rest.     ANXIETY OR MOOD SWINGS:  ~~ If you are irritable or anxious, take a break in a quiet room.        You have been referred for vision therapy. Please contact the clinic to make an appointment.   Please have the clinic release records to us in order to see how therapy is progressing.     [facility]   [address]  [place], [place], [address]  [phone]   [email or web address]     [facility]   [address],  [place], [place], [address] [phone]   [email or web address]     [facility]   [address]  [place], [place], [address]  [phone] ([phone])   [email or web address]     [facility]   [address],  [place], [place], [address]  [phone]   [email or web address]     [facility]   [address], [address]  [place] [address] [phone]  [email or web address]

## 2017-04-27 ENCOUNTER — MYC MEDICAL ADVICE (OUTPATIENT)
Dept: FAMILY MEDICINE | Facility: CLINIC | Age: 42
End: 2017-04-27

## 2017-04-27 ASSESSMENT — ANXIETY QUESTIONNAIRES: GAD7 TOTAL SCORE: 8

## 2017-04-27 ASSESSMENT — PATIENT HEALTH QUESTIONNAIRE - PHQ9: SUM OF ALL RESPONSES TO PHQ QUESTIONS 1-9: 9

## 2017-05-01 ENCOUNTER — TRANSFERRED RECORDS (OUTPATIENT)
Dept: HEALTH INFORMATION MANAGEMENT | Facility: CLINIC | Age: 42
End: 2017-05-01

## 2017-05-03 ENCOUNTER — HOSPITAL ENCOUNTER (OUTPATIENT)
Dept: OCCUPATIONAL THERAPY | Facility: CLINIC | Age: 42
Setting detail: THERAPIES SERIES
End: 2017-05-03
Attending: PHYSICIAN ASSISTANT
Payer: COMMERCIAL

## 2017-05-03 PROCEDURE — 40000768 ZZHC STATISTIC OT CONCUSSION VISIT: Performed by: REHABILITATION PRACTITIONER

## 2017-05-03 PROCEDURE — 97535 SELF CARE MNGMENT TRAINING: CPT | Mod: GO | Performed by: REHABILITATION PRACTITIONER

## 2017-05-05 ENCOUNTER — TELEPHONE (OUTPATIENT)
Dept: SURGERY | Facility: CLINIC | Age: 42
End: 2017-05-05
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
## 2017-05-09 ENCOUNTER — HOSPITAL ENCOUNTER (OUTPATIENT)
Dept: OCCUPATIONAL THERAPY | Facility: CLINIC | Age: 42
Setting detail: THERAPIES SERIES
End: 2017-05-09
Attending: PHYSICIAN ASSISTANT
Payer: COMMERCIAL

## 2017-05-09 PROCEDURE — 97535 SELF CARE MNGMENT TRAINING: CPT | Mod: GO | Performed by: REHABILITATION PRACTITIONER

## 2017-05-09 PROCEDURE — 40000768 ZZHC STATISTIC OT CONCUSSION VISIT: Performed by: REHABILITATION PRACTITIONER

## 2017-05-12 ENCOUNTER — MYC MEDICAL ADVICE (OUTPATIENT)
Dept: FAMILY MEDICINE | Facility: CLINIC | Age: 42
End: 2017-05-12

## 2017-05-15 ENCOUNTER — HOSPITAL ENCOUNTER (OUTPATIENT)
Dept: OCCUPATIONAL THERAPY | Facility: CLINIC | Age: 42
Setting detail: THERAPIES SERIES
End: 2017-05-15
Attending: PHYSICIAN ASSISTANT
Payer: COMMERCIAL

## 2017-05-15 PROCEDURE — 40000768 ZZHC STATISTIC OT CONCUSSION VISIT: Performed by: REHABILITATION PRACTITIONER

## 2017-05-15 PROCEDURE — 97535 SELF CARE MNGMENT TRAINING: CPT | Mod: GO | Performed by: REHABILITATION PRACTITIONER

## 2017-05-19 ENCOUNTER — MYC MEDICAL ADVICE (OUTPATIENT)
Dept: FAMILY MEDICINE | Facility: CLINIC | Age: 42
End: 2017-05-19
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
## 2017-05-23 ENCOUNTER — HOSPITAL ENCOUNTER (OUTPATIENT)
Dept: OCCUPATIONAL THERAPY | Facility: CLINIC | Age: 42
Setting detail: THERAPIES SERIES
End: 2017-05-23
Attending: PHYSICIAN ASSISTANT
Payer: COMMERCIAL

## 2017-05-23 PROCEDURE — 40000768 ZZHC STATISTIC OT CONCUSSION VISIT: Performed by: REHABILITATION PRACTITIONER

## 2017-05-23 PROCEDURE — 97535 SELF CARE MNGMENT TRAINING: CPT | Mod: GO | Performed by: REHABILITATION PRACTITIONER

## 2017-05-30 ENCOUNTER — HOSPITAL ENCOUNTER (OUTPATIENT)
Dept: OCCUPATIONAL THERAPY | Facility: CLINIC | Age: 42
Setting detail: THERAPIES SERIES
End: 2017-05-30
Attending: PHYSICIAN ASSISTANT
Payer: COMMERCIAL

## 2017-05-30 PROCEDURE — 97535 SELF CARE MNGMENT TRAINING: CPT | Mod: GO | Performed by: REHABILITATION PRACTITIONER

## 2017-05-30 PROCEDURE — 40000768 ZZHC STATISTIC OT CONCUSSION VISIT: Performed by: REHABILITATION PRACTITIONER

## 2017-05-30 PROCEDURE — 97537 COMMUNITY/WORK REINTEGRATION: CPT | Mod: GO | Performed by: REHABILITATION PRACTITIONER

## 2017-05-31 DIAGNOSIS — S06.0X0S CONCUSSION WITHOUT LOSS OF CONSCIOUSNESS, SEQUELA (H): Primary | ICD-10-CM

## 2017-06-08 ENCOUNTER — OFFICE VISIT (OUTPATIENT)
Dept: SURGERY | Facility: CLINIC | Age: 42
End: 2017-06-08

## 2017-06-08 VITALS
OXYGEN SATURATION: 96 % | SYSTOLIC BLOOD PRESSURE: 124 MMHG | DIASTOLIC BLOOD PRESSURE: 83 MMHG | HEART RATE: 70 BPM | TEMPERATURE: 98.9 F | HEIGHT: 68 IN | BODY MASS INDEX: 38.22 KG/M2 | WEIGHT: 252.2 LBS

## 2017-06-08 DIAGNOSIS — S06.0X0D CONCUSSION, WITHOUT LOSS OF CONSCIOUSNESS, SUBSEQUENT ENCOUNTER: Primary | ICD-10-CM

## 2017-06-08 ASSESSMENT — PAIN SCALES - GENERAL: PAINLEVEL: MILD PAIN (2)
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
## 2017-06-08 NOTE — MR AVS SNAPSHOT
After Visit Summary   6/8/2017    Gage Carter    MRN: [id number]           Patient Information     Date Of Birth          1975        Visit Information        Provider Department      6/8/2017 4:00 PM Oralia Barlow NP [facility] Concussion        Today's Diagnoses     Concussion, without loss of consciousness, subsequent encounter    -  1       Follow-ups after your visit        Additional Services     CONCUSSION  REFERRAL       [facility] is referring you to the Concussion  service at [facility].      The  Representative will assist you in the coordination of your concussion care as prescribed by your physician.    The  Representative will contact you within one business day, or you may contact the  Representative at [phone].    Referral Options:  Non-Sports related concussion management    Referral to physical therapy, speech therapy  F/up in 6 - 8 weeks         Coverage of these services are subject to the terms and limitations of your health insurance plan.  Please call member services at your health plan with any benefit or coverage questions.     If X-rays, CT or MRI's have been performed, please contact the facility where they were done, to arrange for  prior to your scheduled appointment.  Please bring this referral request to your appointment and present it to your specialist.                  Follow-up notes from your care team     Return in about 6 weeks (around 7/20/2017).      Your next 10 appointments already scheduled     Jun 12, 2017  4:00 PM CDT   Concussion Eval with Ingrid Martel, PT   [facility] ([facility])    [address] [place] [address]   [phone]            Jun 14, 2017  8:15 AM CDT   Treatment 45 with Aggie Parrish, OT   [facility] ([facility])    [address] [place] [address]  [place]  [address]  [place] [address]   [phone]            Jun 21, 2017  9:45 AM CDT   Treatment 45 with Aggie Parrish OT   [facility] ([facility])    [address] [place] [address] [address]  [place] [address]   [phone]            Jun 22, 2017  3:15 PM CDT   Concussion Eval with Cara Read, SLP   [facility] ([facility])    [address] [place] [address] [address]  [place] [address]   [phone]            Jun 28, 2017  9:15 AM CDT   Treatment 45 with Aggie Parrish OT   [facility] ([facility])    [address] [place] [address] [address]  [place] [address]   [phone]            Jul 05, 2017 11:15 AM CDT   Treatment 45 with Aggie Parrish OT   [facility] ([facility])    [address] [place] [address] [address]  [place] [address]   [phone]            Aug 03, 2017  4:00 PM CDT   (Arrive by 3:45 PM)   RETURN CONCUSSION with STACIA Bradley CNP   [facility] Concussion ([facility])    [address] [id number] [phone]              Who to contact     Please call your clinic at [phone] to:    Ask questions about your health    Make or cancel appointments    Discuss your medicines    Learn about your test results    Speak to your doctor   If you have compliments or concerns about an experience at your clinic, or if you wish to file a complaint, please contact [facility] Patient Relations at [phone] or email us at [email or web address]         Additional Information About Your Visit        MyChart Information     [employer] gives you secure access to your electronic health record. If you see a primary care provider, you can also send messages to your care team and make appointments. If you have questions, please call your primary care clinic.  If you do not have a  "primary care provider, please call [phone] and they will assist you.      [employer] is an electronic gateway that provides easy, online access to your medical records. With [employer], you can request a clinic appointment, read your test results, renew a prescription or communicate with your care team.     To access your existing account, please contact your [facility] or call [phone] for assistance.        Care EveryWhere ID     This is your Care EveryWhere ID. This could be used by other organizations to access your [facility] medical records  [id number]        Your Vitals Were     Pulse Temperature Height Pulse Oximetry BMI (Body Mass Index)       70 98.9  F (37.2  C) 5' 8\" 96% 38.35 kg/m2        Blood Pressure from Last 3 Encounters:   06/08/17 124/83   04/26/17 136/86   04/19/17 122/72    Weight from Last 3 Encounters:   06/08/17 252 lb 3.2 oz   04/26/17 242 lb 11.2 oz   04/19/17 243 lb              We Performed the Following     CONCUSSION  REFERRAL        Primary Care Provider Office Phone # Fax #    Lesli Linda Hernandes [phone]       [address] [address]        Thank you!     Thank you for choosing [facility] CONCUSSION  for your care. Our goal is always to provide you with excellent care. Hearing back from our patients is one way we can continue to improve our services. Please take a few minutes to complete the written survey that you may receive in the mail after your visit with us. Thank you!             Your Updated Medication List - Protect others around you: Learn how to safely use, store and throw away your medicines at www.disposemymeds.org.          This list is accurate as of: 6/8/17 11:59 PM.  Always use your most recent med list.                   Brand Name Dispense Instructions for use    simvastatin 10 MG tablet    ZOCOR    90 tablet    Take 1 tablet (10 mg) by mouth At Bedtime         "

## 2017-06-08 NOTE — NURSING NOTE
"Chief Complaint   Patient presents with     RECHECK     F/U Concussion       Vitals:    06/08/17 1538   BP: 124/83   BP Location: Left arm   Patient Position: Chair   Cuff Size: Adult Large   Pulse: 70   Temp: 98.9  F (37.2  C)   SpO2: 96%   Weight: 252 lb 3.2 oz   Height: 5' 8\"       Body mass index is 38.35 kg/(m^2).      Sintia Sullivan                       "

## 2017-06-09 ASSESSMENT — PATIENT HEALTH QUESTIONNAIRE - PHQ9: SUM OF ALL RESPONSES TO PHQ QUESTIONS 1-9: 9

## 2017-06-12 ENCOUNTER — HOSPITAL ENCOUNTER (OUTPATIENT)
Dept: PHYSICAL THERAPY | Facility: CLINIC | Age: 42
Setting detail: THERAPIES SERIES
End: 2017-06-12
Attending: PHYSICIAN ASSISTANT
Payer: COMMERCIAL

## 2017-06-12 PROCEDURE — 40000767 ZZHC STATISTIC PT CONCUSSION VISIT: Performed by: PHYSICAL THERAPIST

## 2017-06-12 PROCEDURE — 97112 NEUROMUSCULAR REEDUCATION: CPT | Mod: GP | Performed by: PHYSICAL THERAPIST

## 2017-06-12 PROCEDURE — 97163 PT EVAL HIGH COMPLEX 45 MIN: CPT | Mod: GP | Performed by: PHYSICAL THERAPIST

## 2017-06-12 NOTE — PROGRESS NOTES
06/12/17 1700   Signing Clinician's Name / Credentials   Signing clinician's name / credentials Nolan Martel MPT   Functional Gait Assessment (RICHARD Mars., Ewa GBill F., et al. (2004))   1. GAIT LEVEL SURFACE 3   2. CHANGE IN GAIT SPEED 3   3. GAIT WITH HORIZONTAL HEAD TURNS 2   4. GAIT WITH VERTICAL HEAD TURNS 3   5. GAIT AND PIVOT TURN 3   6. STEP OVER OBSTACLE 3   7. GAIT WITH NARROW BASE OF SUPPORT 3   8. GAIT WITH EYES CLOSED 3   9. AMBULATING BACKWARDS 3   10. STEPS 3   Total Functional Gait Assessment Score   TOTAL SCORE: (MAXIMUM SCORE 30) 29

## 2017-06-14 ENCOUNTER — HOSPITAL ENCOUNTER (OUTPATIENT)
Dept: OCCUPATIONAL THERAPY | Facility: CLINIC | Age: 42
Setting detail: THERAPIES SERIES
End: 2017-06-14
Attending: PHYSICIAN ASSISTANT
Payer: COMMERCIAL

## 2017-06-14 PROCEDURE — 97530 THERAPEUTIC ACTIVITIES: CPT | Mod: GO | Performed by: REHABILITATION PRACTITIONER

## 2017-06-14 PROCEDURE — 97535 SELF CARE MNGMENT TRAINING: CPT | Mod: GO | Performed by: REHABILITATION PRACTITIONER

## 2017-06-14 PROCEDURE — 40000125 ZZHC STATISTIC OT OUTPT VISIT: Performed by: REHABILITATION PRACTITIONER
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
## 2017-06-19 ENCOUNTER — HOSPITAL ENCOUNTER (OUTPATIENT)
Dept: OCCUPATIONAL THERAPY | Facility: CLINIC | Age: 42
Setting detail: THERAPIES SERIES
End: 2017-06-19
Attending: PHYSICIAN ASSISTANT
Payer: COMMERCIAL

## 2017-06-19 PROCEDURE — 97532 ZZHC OT COGNITIVE SKILLS EA 15 MIN: CPT | Mod: GO | Performed by: REHABILITATION PRACTITIONER

## 2017-06-19 PROCEDURE — 40000768 ZZHC STATISTIC OT CONCUSSION VISIT: Performed by: REHABILITATION PRACTITIONER

## 2017-06-19 PROCEDURE — 97535 SELF CARE MNGMENT TRAINING: CPT | Mod: GO | Performed by: REHABILITATION PRACTITIONER

## 2017-06-20 ENCOUNTER — HOSPITAL ENCOUNTER (OUTPATIENT)
Dept: PHYSICAL THERAPY | Facility: CLINIC | Age: 42
Setting detail: THERAPIES SERIES
End: 2017-06-20
Attending: PHYSICIAN ASSISTANT
Payer: COMMERCIAL

## 2017-06-20 PROCEDURE — 40000767 ZZHC STATISTIC PT CONCUSSION VISIT: Performed by: PHYSICAL THERAPIST

## 2017-06-20 PROCEDURE — 97112 NEUROMUSCULAR REEDUCATION: CPT | Mod: GP | Performed by: PHYSICAL THERAPIST

## 2017-06-21 ENCOUNTER — HOSPITAL ENCOUNTER (OUTPATIENT)
Dept: OCCUPATIONAL THERAPY | Facility: CLINIC | Age: 42
Setting detail: THERAPIES SERIES
End: 2017-06-21
Attending: PHYSICIAN ASSISTANT
Payer: COMMERCIAL

## 2017-06-21 PROCEDURE — 40000768 ZZHC STATISTIC OT CONCUSSION VISIT: Performed by: REHABILITATION PRACTITIONER

## 2017-06-21 PROCEDURE — 97535 SELF CARE MNGMENT TRAINING: CPT | Mod: GO | Performed by: REHABILITATION PRACTITIONER

## 2017-06-28 ENCOUNTER — HOSPITAL ENCOUNTER (OUTPATIENT)
Dept: SPEECH THERAPY | Facility: CLINIC | Age: 42
Setting detail: THERAPIES SERIES
End: 2017-06-28
Attending: NURSE PRACTITIONER
Payer: COMMERCIAL

## 2017-06-28 PROCEDURE — 40000211 ZZHC STATISTIC SLP  DEPARTMENT VISIT: Performed by: SPEECH-LANGUAGE PATHOLOGIST

## 2017-06-28 PROCEDURE — 92507 TX SP LANG VOICE COMM INDIV: CPT | Mod: GN,52 | Performed by: SPEECH-LANGUAGE PATHOLOGIST
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
## 2017-07-13 ENCOUNTER — HOSPITAL ENCOUNTER (OUTPATIENT)
Dept: PHYSICAL THERAPY | Facility: CLINIC | Age: 42
Setting detail: THERAPIES SERIES
End: 2017-07-13
Attending: PHYSICIAN ASSISTANT
Payer: COMMERCIAL

## 2017-07-13 PROCEDURE — 40000767 ZZHC STATISTIC PT CONCUSSION VISIT: Performed by: PHYSICAL THERAPIST

## 2017-07-13 PROCEDURE — 97112 NEUROMUSCULAR REEDUCATION: CPT | Mod: GP | Performed by: PHYSICAL THERAPIST

## 2017-07-18 ENCOUNTER — HOSPITAL ENCOUNTER (OUTPATIENT)
Dept: SPEECH THERAPY | Facility: CLINIC | Age: 42
Setting detail: THERAPIES SERIES
End: 2017-07-18
Attending: PHYSICIAN ASSISTANT
Payer: COMMERCIAL

## 2017-07-18 PROCEDURE — 40000211 ZZHC STATISTIC SLP  DEPARTMENT VISIT: Performed by: SPEECH-LANGUAGE PATHOLOGIST

## 2017-07-18 PROCEDURE — 92507 TX SP LANG VOICE COMM INDIV: CPT | Mod: GN | Performed by: SPEECH-LANGUAGE PATHOLOGIST

## 2017-07-24 ENCOUNTER — HOSPITAL ENCOUNTER (OUTPATIENT)
Dept: OCCUPATIONAL THERAPY | Facility: CLINIC | Age: 42
Setting detail: THERAPIES SERIES
End: 2017-07-24
Attending: PHYSICIAN ASSISTANT
Payer: COMMERCIAL

## 2017-07-24 PROCEDURE — 97535 SELF CARE MNGMENT TRAINING: CPT | Mod: GO | Performed by: REHABILITATION PRACTITIONER

## 2017-07-24 PROCEDURE — 40000768 ZZHC STATISTIC OT CONCUSSION VISIT: Performed by: REHABILITATION PRACTITIONER

## 2017-07-26 NOTE — PROGRESS NOTES
Outpatient Occupational Therapy Progress Note     Patient: Keven Carter  : 1975  Insurance:   Payor/Plan Subscriber Name Rel Member # Group #   BCBS - [employer] [place] [place]*  [id number]      [address] [address]       Beginning/End Dates of Reporting Period:  17 to 2017    Referring Provider: Ana Paredes PA-C    Therapy Diagnosis: Impaired ADL/IADL I    Client Self Report: MD recommended pt take 800mg of ibuprofin.  Pt reports he is breaking things and getting emotional.  Recommend follow up with jonathan and pt plans to call.       Objective Measurements:     Objective Measure: [place] read   Details: Critical print size .8, speed at critical print size 204.  Preferred print size 3.2M.       Objective Measure: BIvaba scan course   Details: 6/10 on left, 6/10 on right in 16 seconds, 9/10 right 8/10 left 18 seconds, 9/10 left 9/10 right in 19 seconds.       Objective Measure: Dynavision Mode A   Details: Mode A 58, 54 ( score of 52+ is considered safe range).     Objective Measure: Pepper visual skills for reading test   Details: 98.4% accuracy with corrected reading rate of 86.4 (150-200 WNL)     Objective Measure: Cognistat   Details: Average range- orientation, attention, language, construction, calculation and reasoning, mild impairment in memory.         Goals:     Goal Identifier Concussion Symptoms   Goal Description Patient will identify 3 strategies (computer adaptations, self-awareness and self-management of symptoms, energy conservation strategies, etc.) to decrease his post-concussion symptoms (noise sensitivity, headaches, fatigue etc.), for increased independence during ADL/IADLs.   Target Date 17   Date Met      Progress: 17     Goal Identifier Memory   Goal Description Patient will demonstrate improved memory skills by completing moderately complex short-term memory tasks in occupational therapy with 90% accuracy using compensatory strategies for increased  safety and independence with ADL/IADLS at home, work and in the community.   Target Date 09/01/17   Date Met      Progress:      Goal Identifier Vision   Goal Description Patient will verbalize 2-3 visual modifications (environmental or adaptive) to compensate for decreased vision, light sensitivity and eye strain for increased independence with reading tasks, completing ADL/IADLs and homework.     Target Date 09/01/17   Date Met      Progress: 7/24/17     Goal Identifier Problem Solving   Goal Description Patient will be able to complete a multi-item errand list in the appropriate amount of time and most efficient to improve problem solving ability and sequencing with 90% accuracy in order to return back to school, family/social gatherings, and running errands, etc.   Target Date 09/01/17   Date Met      Progress:       Progress Toward Goals:   Progress this reporting period: Goals 1 and 3 have been met, remaining goals remain unmet.  Pt continues to report significant struggle with managing personal schedule, children's schedule, daily routine tasks and is very concerned about his cognitive status for return to work tasks.  Pt is completing complex problem solving tasks with 80% accuracy currently. All unmet goals remain appropriate and will be extended as needed.        Plan:  Continue therapy per current plan of care.    Discharge:  No

## 2017-08-03 ENCOUNTER — OFFICE VISIT (OUTPATIENT)
Dept: SURGERY | Facility: CLINIC | Age: 42
End: 2017-08-03

## 2017-08-03 VITALS
BODY MASS INDEX: 39.19 KG/M2 | TEMPERATURE: 98 F | SYSTOLIC BLOOD PRESSURE: 129 MMHG | OXYGEN SATURATION: 96 % | DIASTOLIC BLOOD PRESSURE: 90 MMHG | HEART RATE: 77 BPM | WEIGHT: 258.6 LBS | HEIGHT: 68 IN

## 2017-08-03 DIAGNOSIS — F33.1 MODERATE EPISODE OF RECURRENT MAJOR DEPRESSIVE DISORDER (H): Primary | ICD-10-CM

## 2017-08-03 DIAGNOSIS — S06.0X0D CONCUSSION, WITHOUT LOC, SUBSEQUENT ENCOUNTER: ICD-10-CM

## 2017-08-03 ASSESSMENT — ANXIETY QUESTIONNAIRES
7. FEELING AFRAID AS IF SOMETHING AWFUL MIGHT HAPPEN: MORE THAN HALF THE DAYS
IF YOU CHECKED OFF ANY PROBLEMS ON THIS QUESTIONNAIRE, HOW DIFFICULT HAVE THESE PROBLEMS MADE IT FOR YOU TO DO YOUR WORK, TAKE CARE OF THINGS AT HOME, OR GET ALONG WITH OTHER PEOPLE: SOMEWHAT DIFFICULT
3. WORRYING TOO MUCH ABOUT DIFFERENT THINGS: SEVERAL DAYS
GAD7 TOTAL SCORE: 13
5. BEING SO RESTLESS THAT IT IS HARD TO SIT STILL: SEVERAL DAYS
2. NOT BEING ABLE TO STOP OR CONTROL WORRYING: MORE THAN HALF THE DAYS
6. BECOMING EASILY ANNOYED OR IRRITABLE: NEARLY EVERY DAY
1. FEELING NERVOUS, ANXIOUS, OR ON EDGE: MORE THAN HALF THE DAYS

## 2017-08-03 ASSESSMENT — PATIENT HEALTH QUESTIONNAIRE - PHQ9: 5. POOR APPETITE OR OVEREATING: MORE THAN HALF THE DAYS

## 2017-08-03 ASSESSMENT — PAIN SCALES - GENERAL: PAINLEVEL: MODERATE PAIN (5)
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
## 2017-08-03 NOTE — MR AVS SNAPSHOT
"              After Visit Summary   8/3/2017    Gage Carter    MRN: [id number]           Patient Information     Date Of Birth          1975        Visit Information        Provider Department      8/3/2017 4:00 PM Ree Sheffield APRN CNP M [facility] Concussion        Today's Diagnoses     Moderate episode of recurrent major depressive disorder (H)    -  1    Concussion, without LOC, subsequent encounter          Care Instructions    Lets try to go back and get treatment for your mental health.    Psychiatry consult-- for medication recommendations, one time consult visit   Counseling-- for ongoing help (Jacinto May)    Outbursts   Take 3 deep breaths before you react to harsh words or events   Every day, try to do some mindfullness or meditation for 10-20 minutes   Exercise every day.  Stay off bicycles, but keep exercising    Inflammation   Start taking tumeric 500 mg twice daily   Start taking B-complex vitamin daily   Avoid complex carbs/sugars-- no cookies, cake, sweets in general.     Look into \"inflammation\" diet.  Lots of books out there.  If you want to try the juice cleanse that you know, that is good too.     Work:   Limit to 4 hours a day with a significant break in the middle (30 minutes).  Morning hours    Limited multi-tasking    Limited memory requirements    Limited computer time and             Follow-ups after your visit        Additional Services     BEHAVIORAL / SPIRITUAL HEALTH ([facility] ONLY)       The [facility] Behavioral / Spiritual Health Team is available to support any patient who receives care at the [facility].  This team provides and/or connects patients and families to appropriate psychological, spiritual, and social work services.     The Behavioral / Spiritual Health Team  also provides consultation to medical providers and other care team members regarding patient behavioral/mental health issues, psychosocial concerns, or spiritual " needs.  Services are provided by behavioral health clinicians, licensed psychologists, licensed social workers, and a .     The Behavioral / Spiritual Health Team will coordinate with the patient's medical care team to determine the appropriate response(s) to the current need.        Please provide the following information to assist us in addressing the current concern:    1. Reason for Referral? Post concussion, hx of MDD and MARGIE off treatment for a few years, now uncontrolled.    Last self harm thoughts mid July    2. Has clinical staff discussed this referral to the Behavioral / Spiritual Health Team with the patient and/or caregiver? Yes    3. Who should we contact on the patient's care team to discuss this issue further or to coordinate care? Me            MENTAL HEALTH REFERRAL       Your provider has referred you to: [facility]: Psychiatry Clinic [facility] [phone]   [email or web address]  PSYCHIATRY  ONE TIME VISIT.  Pt with history of MDD and MARGIE, had been on meds for years, weaned off and doing ok until concussion in March, now both are uncontrolled and request medication suggestions.     All scheduling is subject to the client's specific insurance plan & benefits, provider/location availability, and provider clinical specialities.  Please arrive 15 minutes early for your first appointment and bring your completed paperwork.    Please be aware that coverage of these services is subject to the terms and limitations of your health insurance plan.  Call member services at your health plan with any benefit or coverage questions.                  Follow-up notes from your care team     Return in about 6 weeks (around 9/14/2017).      Your next 10 appointments already scheduled     Aug 10, 2017 10:00 AM CDT   Concussion Treatment with Ingrid Martel, PT   [facility] ([facility])    [address] [address]    [phone]            Aug 17, 2017  8:45 AM CDT   Concussion Treatment with Cara Read, SLP   [facility] ([facility])    [address]  [address]  [place] [address]   [phone]            Aug 17, 2017 10:00 AM CDT   Concussion Treatment with Ingrid Martel, MELISA   [facility] ([facility])    [address] [place] [address]  [address]  [place] [address]   [phone]            Aug 31, 2017  9:30 AM CDT   Concussion Treatment with Cara Read, SLP   [facility] ([facility])    [address]  [address]  [place] [address]   [phone]              Who to contact     Please call your clinic at [phone] to:    Ask questions about your health    Make or cancel appointments    Discuss your medicines    Learn about your test results    Speak to your doctor   If you have compliments or concerns about an experience at your clinic, or if you wish to file a complaint, please contact [facility] Patient Relations at [phone] or email us at [email or web address]         Additional Information About Your Visit        [employer] Information     [employer] gives you secure access to your electronic health record. If you see a primary care provider, you can also send messages to your care team and make appointments. If you have questions, please call your primary care clinic.  If you do not have a primary care provider, please call [phone] and they will assist you.      [employer] is an electronic gateway that provides easy, online access to your medical records. With [employer], you can request a clinic appointment, read your test results, renew a prescription or communicate with your care team.     To access your existing account, please contact your [facility] or call [phone] for assistance.        Care EveryWhere ID     This is your Care  "EveryWhere ID. This could be used by other organizations to access your [facility] medical records  [id number]        Your Vitals Were     Pulse Temperature Height Pulse Oximetry BMI (Body Mass Index)       77 98  F (36.7  C) (Oral) 5' 8\" 96% 39.32 kg/m2        Blood Pressure from Last 3 Encounters:   08/03/17 129/90   06/08/17 124/83   04/26/17 136/86    Weight from Last 3 Encounters:   08/03/17 258 lb 9.6 oz   06/08/17 252 lb 3.2 oz   04/26/17 242 lb 11.2 oz              We Performed the Following     BEHAVIORAL / SPIRITUAL HEALTH ([facility] ONLY)     MENTAL HEALTH REFERRAL        Primary Care Provider Office Phone # Fax #    Lesli Hernandes [phone]       [address] [address]        Equal Access to Services     [facility] : Hadii anabela parker Sochari, waaxda luqadaha, qaybta kaalmada sammi, nickolas johnson . So [facility] [phone].    ATENCIÓN: Si habla español, tiene a hidalgo disposición servicios gratuitos de asistencia lingüística. Massimo al [phone].    We comply with applicable federal civil rights laws and [place] laws. We do not discriminate on the basis of race, color, national origin, age, disability sex, sexual orientation or gender identity.            Thank you!     Thank you for choosing [facility]  for your care. Our goal is always to provide you with excellent care. Hearing back from our patients is one way we can continue to improve our services. Please take a few minutes to complete the written survey that you may receive in the mail after your visit with us. Thank you!             Your Updated Medication List - Protect others around you: Learn how to safely use, store and throw away your medicines at www.disposemymeds.org.          This list is accurate as of: 8/3/17  5:22 PM.  Always use your most recent med list.                   Brand Name Dispense Instructions for use Diagnosis    IBUPROFEN " PO           simvastatin 10 MG tablet    ZOCOR    90 tablet    Take 1 tablet (10 mg) by mouth At Bedtime    Hypertriglyceridemia       TYLENOL PO

## 2017-08-03 NOTE — PATIENT INSTRUCTIONS
"Lets try to go back and get treatment for your mental health.    Psychiatry consult-- for medication recommendations, one time consult visit   Counseling-- for ongoing help (Jacinto May)    Outbursts   Take 3 deep breaths before you react to harsh words or events   Every day, try to do some mindfullness or meditation for 10-20 minutes   Exercise every day.  Stay off bicycles, but keep exercising    Inflammation   Start taking tumeric 500 mg twice daily   Start taking B-complex vitamin daily   Avoid complex carbs/sugars-- no cookies, cake, sweets in general.     Look into \"inflammation\" diet.  Lots of books out there.  If you want to try the juice cleanse that you know, that is good too.     Work:   Limit to 4 hours a day with a significant break in the middle (30 minutes).  Morning hours    Limited multi-tasking    Limited memory requirements    Limited computer time and     "

## 2017-08-03 NOTE — NURSING NOTE
"Chief Complaint   Patient presents with     Clinic Care Coordination - Follow-up     return       Vitals:    08/03/17 1557   BP: 129/90   Pulse: 77   Temp: 98  F (36.7  C)   TempSrc: Oral   SpO2: 96%   Weight: 258 lb 9.6 oz   Height: 5' 8\"       Body mass index is 39.32 kg/(m^2).      Jessica RAYA LPN                       "
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
## 2017-08-04 ASSESSMENT — PATIENT HEALTH QUESTIONNAIRE - PHQ9: SUM OF ALL RESPONSES TO PHQ QUESTIONS 1-9: 16

## 2017-08-04 ASSESSMENT — ANXIETY QUESTIONNAIRES: GAD7 TOTAL SCORE: 13

## 2017-08-09 ENCOUNTER — OFFICE VISIT (OUTPATIENT)
Dept: FAMILY MEDICINE | Facility: CLINIC | Age: 42
End: 2017-08-09
Payer: COMMERCIAL

## 2017-08-09 ENCOUNTER — MYC MEDICAL ADVICE (OUTPATIENT)
Dept: INTERNAL MEDICINE | Facility: CLINIC | Age: 42
End: 2017-08-09

## 2017-08-09 VITALS
TEMPERATURE: 99 F | WEIGHT: 262.3 LBS | SYSTOLIC BLOOD PRESSURE: 136 MMHG | DIASTOLIC BLOOD PRESSURE: 89 MMHG | OXYGEN SATURATION: 95 % | HEART RATE: 87 BPM | BODY MASS INDEX: 39.75 KG/M2 | HEIGHT: 68 IN

## 2017-08-09 DIAGNOSIS — S06.0X0S CONCUSSION WITHOUT LOSS OF CONSCIOUSNESS, SEQUELA (H): Primary | ICD-10-CM

## 2017-08-09 DIAGNOSIS — F33.1 MODERATE EPISODE OF RECURRENT MAJOR DEPRESSIVE DISORDER (H): ICD-10-CM

## 2017-08-09 PROCEDURE — 99214 OFFICE O/P EST MOD 30 MIN: CPT | Performed by: FAMILY MEDICINE

## 2017-08-09 ASSESSMENT — PATIENT HEALTH QUESTIONNAIRE - PHQ9: SUM OF ALL RESPONSES TO PHQ QUESTIONS 1-9: 11

## 2017-08-09 NOTE — NURSING NOTE
"Chief Complaint   Patient presents with     Referral     Neuropsych       Initial /89  Pulse 87  Temp 99  F (37.2  C) (Oral)  Ht 5' 8\" (1.727 m)  Wt 262 lb 4.8 oz (119 kg)  SpO2 95%  BMI 39.88 kg/m2 Estimated body mass index is 39.88 kg/(m^2) as calculated from the following:    Height as of this encounter: 5' 8\" (1.727 m).    Weight as of this encounter: 262 lb 4.8 oz (119 kg).  Medication Reconciliation: complete      Health Maintenance that is potentially due pending provider review:  NONE    n/a    BLOSSOM Little  "

## 2017-08-09 NOTE — MR AVS SNAPSHOT
After Visit Summary   8/9/2017    Gage Carter    MRN: [id number]           Patient Information     Date Of Birth          1975        Visit Information        Provider Department      8/9/2017 1:45 PM Serena Ko MD [facility]        Today's Diagnoses     Concussion without loss of consciousness, sequela (H)    -  1    Moderate episode of recurrent major depressive disorder (H)          Care Instructions    [phone] for appointments/referrals  [facility]-ask for   Chrissy Arreola- for ADHD evaluation    Or    Consult,[place] for therapy ,phone number [phone] with Amrit Mcmullen   [facility] [address]  For billing   or insurance question: [phone] t     Hope that helps            Follow-ups after your visit        Additional Services     MENTAL HEALTH REFERRAL       Your provider has referred you to: [facility]: [facility] - [facility] [phone]   [email or web address]   *Referral from [facility] Primary Care Provider required - Consultation Model - medication management & future refills will be returned to [facility] PCP upon completion of evaluation  *Please call to schedule an appointment.    Please be aware that coverage of these services is subject to the terms and limitations of your health insurance plan.  Call member services at your health plan with any benefit or coverage questions.      Please bring the following to your appointment:  >>   Any x-rays, CTs or MRIs which have been performed.  Contact the facility where they were done to arrange for  prior to your scheduled appointment.  Any new CT, MRI or other procedures ordered by your specialist must be performed at a [facility] facility or coordinated by your clinic's referral office.    >>   List of current medications    >>   This referral request   >>   Any documents/labs given to you for this referral                  Your next 10 appointments already scheduled     Aug 10, 2017 10:00 AM CDT   Concussion Treatment with Ingrid Martel, PT   [facility] ([facility])    [address] [place] [address] [address]  [place] [address]   [phone]            Aug 17, 2017  8:45 AM CDT   Concussion Treatment with Cara Read, SLP   [facility] ([facility])    [address] [place] [address] [address]  [place] [address]   [phone]            Aug 17, 2017 10:00 AM CDT   Concussion Treatment with Ingrid Martel, PT   [facility] ([facility])    [address] [place] [address] [address]  [place] [address]   [phone]            Aug 31, 2017  9:30 AM CDT   Concussion Treatment with Cara Read, SLP   [facility] ([facility])    [address] [place] [address] [address]  [place] [address]   [phone]              Who to contact     If you have questions or need follow up information about today's clinic visit or your schedule please contact [facility] directly at [phone].  Normal or non-critical lab and imaging results will be communicated to you by [employer]hart, letter or phone within 4 business days after the clinic has received the results. If you do not hear from us within 7 days, please contact the clinic through [employer]hart or phone. If you have a critical or abnormal lab result, we will notify you by phone as soon as possible.  Submit refill requests through [employer] or call your pharmacy and they will forward the refill request to us. Please allow 3 business days for your refill to be completed.          Additional Information About Your Visit        [employer] Information     [employer] gives you secure access to your electronic health record. If you see a primary care provider, you can also send messages to your care team  "and make appointments. If you have questions, please call your primary care clinic.  If you do not have a primary care provider, please call [phone] and they will assist you.        Care EveryWhere ID     This is your Care EveryWhere ID. This could be used by other organizations to access your [facility] medical records  [id number]        Your Vitals Were     Pulse Temperature Height Pulse Oximetry BMI (Body Mass Index)       87 99  F (37.2  C) (Oral) 5' 8\" (1.727 m) 95% 39.88 kg/m2        Blood Pressure from Last 3 Encounters:   08/09/17 136/89   08/03/17 129/90   06/08/17 124/83    Weight from Last 3 Encounters:   08/09/17 262 lb 4.8 oz (119 kg)   08/03/17 258 lb 9.6 oz (117.3 kg)   06/08/17 252 lb 3.2 oz (114.4 kg)              We Performed the Following     MENTAL HEALTH REFERRAL        Primary Care Provider Office Phone # Fax #    Lesli Linda Hernandes [phone]       [facility] [facility] [address]        Equal Access to Services     [facility] HospitalPHONG : Hadii anabela Werner, warosamariada yasmany, qaelieta kaalmajoyce chapa, nickolas johnson . So [facility] [phone].    ATENCIÓN: Si habla español, tiene a hidalgo disposición servicios gratuitos de asistencia lingüística. [facility] [phone].    We comply with applicable federal civil rights laws and [place] laws. We do not discriminate on the basis of race, color, national origin, age, disability sex, sexual orientation or gender identity.            Thank you!     Thank you for choosing [facility]  for your care. Our goal is always to provide you with excellent care. Hearing back from our patients is one way we can continue to improve our services. Please take a few minutes to complete the written survey that you may receive in the mail after your visit with us. Thank you!             Your Updated Medication List - Protect others around you: Learn how to safely use, store and throw away your medicines at " www.disposemymeds.org.          This list is accurate as of: 8/9/17  2:42 PM.  Always use your most recent med list.                   Brand Name Dispense Instructions for use Diagnosis    IBUPROFEN PO           simvastatin 10 MG tablet    ZOCOR    90 tablet    Take 1 tablet (10 mg) by mouth At Bedtime    Hypertriglyceridemia       TYLENOL PO
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
## 2017-08-09 NOTE — PATIENT INSTRUCTIONS
[phone] for appointments/referrals  [facility] counseling services-ask for   Chrissy Arreola- for ADHD evaluation    Or    Consult,[place] for therapy ,phone number [phone] with Amrit Mcmullen   [facility] [address] [place] [address]. [address]  For billing   or insurance question: [phone] t     Hope that helps

## 2017-08-10 ENCOUNTER — HOSPITAL ENCOUNTER (OUTPATIENT)
Dept: PHYSICAL THERAPY | Facility: CLINIC | Age: 42
Setting detail: THERAPIES SERIES
End: 2017-08-10
Attending: PHYSICIAN ASSISTANT
Payer: COMMERCIAL

## 2017-08-10 PROCEDURE — 97112 NEUROMUSCULAR REEDUCATION: CPT | Mod: GP | Performed by: PHYSICAL THERAPIST

## 2017-08-10 PROCEDURE — 40000767 ZZHC STATISTIC PT CONCUSSION VISIT: Performed by: PHYSICAL THERAPIST

## 2017-08-11 ENCOUNTER — TELEPHONE (OUTPATIENT)
Dept: SURGERY | Facility: CLINIC | Age: 42
End: 2017-08-11

## 2017-08-22 ENCOUNTER — HOSPITAL ENCOUNTER (OUTPATIENT)
Dept: SPEECH THERAPY | Facility: CLINIC | Age: 42
Setting detail: THERAPIES SERIES
End: 2017-08-22
Attending: PHYSICIAN ASSISTANT
Payer: COMMERCIAL

## 2017-08-22 PROCEDURE — 40000211 ZZHC STATISTIC SLP  DEPARTMENT VISIT: Performed by: SPEECH-LANGUAGE PATHOLOGIST

## 2017-08-22 PROCEDURE — 92507 TX SP LANG VOICE COMM INDIV: CPT | Mod: GN | Performed by: SPEECH-LANGUAGE PATHOLOGIST

## 2017-08-23 ENCOUNTER — OFFICE VISIT (OUTPATIENT)
Dept: NEUROPSYCHOLOGY | Facility: CLINIC | Age: 42
End: 2017-08-23

## 2017-08-23 DIAGNOSIS — F41.9 CHRONIC ANXIETY: ICD-10-CM

## 2017-08-23 DIAGNOSIS — F45.1 SOMATIC SYMPTOM AND RELATED DISORDERS: ICD-10-CM

## 2017-08-23 DIAGNOSIS — S06.0X0D CONCUSSION WITH NO LOSS OF CONSCIOUSNESS, SUBSEQUENT ENCOUNTER: Primary | ICD-10-CM

## 2017-08-23 DIAGNOSIS — F33.9 MAJOR DEPRESSION, RECURRENT, CHRONIC (H): ICD-10-CM
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
## 2017-08-23 NOTE — PROGRESS NOTES
The patient was seen for neuropsychological evaluation at the request of Ree Sheffield for the purposes of diagnostic clarification and treatment planning.  Two hours of face-to-face testing were provided by this writer.  Please see Dr. Jovanni Gonsales's report for a full interpretation of the findings.

## 2017-08-28 ENCOUNTER — HOSPITAL ENCOUNTER (OUTPATIENT)
Dept: SPEECH THERAPY | Facility: CLINIC | Age: 42
Setting detail: THERAPIES SERIES
End: 2017-08-28
Attending: PHYSICIAN ASSISTANT
Payer: COMMERCIAL

## 2017-08-28 ENCOUNTER — HOSPITAL ENCOUNTER (OUTPATIENT)
Dept: OCCUPATIONAL THERAPY | Facility: CLINIC | Age: 42
Setting detail: THERAPIES SERIES
End: 2017-08-28
Attending: PHYSICIAN ASSISTANT
Payer: COMMERCIAL

## 2017-08-28 PROCEDURE — 92507 TX SP LANG VOICE COMM INDIV: CPT | Mod: GN | Performed by: SPEECH-LANGUAGE PATHOLOGIST

## 2017-08-28 PROCEDURE — 40000768 ZZHC STATISTIC OT CONCUSSION VISIT: Performed by: REHABILITATION PRACTITIONER

## 2017-08-28 PROCEDURE — 97535 SELF CARE MNGMENT TRAINING: CPT | Mod: GO | Performed by: REHABILITATION PRACTITIONER

## 2017-08-28 PROCEDURE — 40000211 ZZHC STATISTIC SLP  DEPARTMENT VISIT: Performed by: SPEECH-LANGUAGE PATHOLOGIST
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
## 2017-08-29 NOTE — PROGRESS NOTES
Date: 2017  Staff: PITA  Tech: MIKHAIL    NAME: Gage Carter  MRN:  [id number]  :  1975  Age: 42  Sex: M  Hand: Right  Educ: 18  Vision: 20/25    Orientation     Time  -0     Place       Personal info 3/    TOMM   T1: 41  T2: 50  T3: 49    WRAT4   SSa %ile Grade Equiv.     Word Reading  103 58 >12.9    WAIS-IV     VCI: ~105  CHRISTINE: ~86     WMI: 97  PSI: 94     FSIQ: ~95     Raw SSa     Similarities  27 10     Information   17 12     Block Design  34 8     Matrix Reasoning 12 7     Digit Span  31 11     Arithmetic  12 8     Symbol Search  34 11     Coding  53 7    COWAT (FAS)   Raw: 36   SSa: 9 T: 39    Animal Naming Test   Raw: 22  SSa: 11  T: 45    [place] Naming Test   Raw: 55/60  SSa: 9  T: 34    Trail Making Test    Raw  Err SSa %ile   A 21  0 12 53   B 60  1 11 44    Casey-O     Raw T %ile     Time to Copy  263  -- >16     Copy    27 -- <1     Short Delay Recall 3.5 <20 <1     Long Delay Recall 6 <20 <1     Recognition Total 20 45 31    AVLT     Trial 1 2 3 4 5 B 6 30  60    5 6 6 8 5 4 5 3 4     Mean(SD)    12.3(1.9)  10.4(2.6) 10.2(2.8)     30  Recog Hits/FPs  12/7     60  Recog Hits/FPs  11/1     AVLT-X Exaggeration Index 0    WMS-IV  Raw SSa/%ile     LM I  14/50 5     LM II  7/50 3     LM Recog. 21/30 10-16 %ile     VR I  29/43 6     VR II  7/43 4     VR Recog. 3/7 3-9 %ile     Grooved Pegboard    Raw  Drops SSa T   RH  112  1 4 21     0 3 19    BDI-II  Raw: 27  Interpretation: Moderate+  STAI  S: 53; 93rd %ile  Interpretation: Moderate  T: 60; 100th %ile Interpretation: Severe    MMPI-2 (Full 567)   FBS raw: 28 T: 93   [place] Code: 2 36 80 1 4 79+5- F +-L/:K#   RCd: 70, RC1: 71, RC2: 68, RC6: 67

## 2017-08-31 ENCOUNTER — HOSPITAL ENCOUNTER (OUTPATIENT)
Dept: SPEECH THERAPY | Facility: CLINIC | Age: 42
Setting detail: THERAPIES SERIES
End: 2017-08-31
Attending: PHYSICIAN ASSISTANT
Payer: COMMERCIAL

## 2017-08-31 PROCEDURE — 92507 TX SP LANG VOICE COMM INDIV: CPT | Mod: GN | Performed by: SPEECH-LANGUAGE PATHOLOGIST

## 2017-08-31 PROCEDURE — 40000211 ZZHC STATISTIC SLP  DEPARTMENT VISIT: Performed by: SPEECH-LANGUAGE PATHOLOGIST

## 2017-09-14 ENCOUNTER — OFFICE VISIT (OUTPATIENT)
Dept: PSYCHIATRY | Facility: CLINIC | Age: 42
End: 2017-09-14
Payer: COMMERCIAL

## 2017-09-14 DIAGNOSIS — F43.22 ADJUSTMENT DISORDER WITH ANXIOUS MOOD: Primary | ICD-10-CM

## 2017-09-14 PROCEDURE — 90792 PSYCH DIAG EVAL W/MED SRVCS: CPT | Performed by: PSYCHIATRY & NEUROLOGY

## 2017-09-14 RX ORDER — LAMOTRIGINE 100 MG/1
TABLET ORAL
Qty: 60 TABLET | Refills: 6 | Status: SHIPPED | OUTPATIENT
Start: 2017-10-12 | End: 2017-09-28

## 2017-09-14 RX ORDER — LAMOTRIGINE 25 MG/1
TABLET ORAL
Qty: 42 TABLET | Refills: 0 | Status: SHIPPED | OUTPATIENT
Start: 2017-09-14 | End: 2017-11-16 | Stop reason: DRUGHIGH

## 2017-09-14 ASSESSMENT — ANXIETY QUESTIONNAIRES
5. BEING SO RESTLESS THAT IT IS HARD TO SIT STILL: NOT AT ALL
GAD7 TOTAL SCORE: 1
3. WORRYING TOO MUCH ABOUT DIFFERENT THINGS: NOT AT ALL
6. BECOMING EASILY ANNOYED OR IRRITABLE: SEVERAL DAYS
1. FEELING NERVOUS, ANXIOUS, OR ON EDGE: NOT AT ALL
7. FEELING AFRAID AS IF SOMETHING AWFUL MIGHT HAPPEN: NOT AT ALL
2. NOT BEING ABLE TO STOP OR CONTROL WORRYING: NOT AT ALL

## 2017-09-14 ASSESSMENT — PATIENT HEALTH QUESTIONNAIRE - PHQ9
SUM OF ALL RESPONSES TO PHQ QUESTIONS 1-9: 4
5. POOR APPETITE OR OVEREATING: NOT AT ALL

## 2017-09-14 NOTE — PATIENT INSTRUCTIONS
Treatment Plan:  Start Lamictal (lamotrigine) 25 mg per day for 2 weeks, then 50 mg per day for 2 weeks, then to 100 mg. Rash information provided.    After one week of 100 mg Lamictal, go up to 150 mg for a week, then to 200 mg.   Call [facility] at [phone] to schedule psychotherapy    Supplementation with vitamin D was suggested and information was provided regarding this.   Safety plan was reviewed; to the ER as needed or call after hours crisis line; [phone]  Education and counseling was done regarding use of medications, psychotherapy options  Call for a follow up appointment with Dr. Smith in 6-8 weeks; [phone].
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
## 2017-09-14 NOTE — MR AVS SNAPSHOT
MRN:[id number]                      After Visit Summary   9/14/2017    Gage Carter    MRN: [id number]           Visit Information        Provider Department      9/14/2017 9:00 AM Rahul Smith MD [facility]        Your next 10 appointments already scheduled     Sep 15, 2017  4:15 PM CDT   (Arrive by 4:00 PM)   RETURN CONCUSSION with STACIA Bradley [facility] ([facility])    [address]  4th Floor  [facility] [address]   [phone]            Sep 19, 2017  2:00 PM CDT   Concussion Treatment with Aggie Parrish, OT   [facility] ([facility])    [address] [place] [address]  [address]  [facility] [address]   [phone]            Sep 19, 2017  2:45 PM CDT   Concussion Treatment with Cara Read, SLP   [facility] ([facility])    [address] [place] [address]  [address]  [facility] [address]   [phone]            Sep 25, 2017  8:30 AM CDT   Concussion Treatment with Cara Read, SLP   [facility] ([facility])    [address] [place] [address]  [address]  [facility] [address]   [phone]            Sep 25, 2017  9:15 AM CDT   Concussion Treatment with Aggie Parrish, OT   [facility] ([facility])    [address] [place] [address]  [address]  [facility] [address]   [phone]              Care Instructions      Treatment Plan:  Start Lamictal (lamotrigine) 25 mg per day for 2 weeks, then 50 mg per day for 2 weeks, then to 100 mg. Rash information provided.    After one week of 100 mg Lamictal, go up to 150 mg for a week, then to 200 mg.   Call [facility] at [phone] to schedule psychotherapy    Supplementation with vitamin D was suggested and information was provided regarding this.   Safety plan was reviewed; to the ER as needed or call after hours crisis line;  [phone]  Education and counseling was done regarding use of medications, psychotherapy options  Call for a follow up appointment with Dr. Smith in 6-8 weeks; [phone].           [employer] Information     [employer] gives you secure access to your electronic health record. If you see a primary care provider, you can also send messages to your care team and make appointments. If you have questions, please call your primary care clinic.  If you do not have a primary care provider, please call [phone] and they will assist you.        Care EveryWhere ID     This is your Care EveryWhere ID. This could be used by other organizations to access your [facility] medical records  [id number]        Equal Access to Services     IMELDA MAIN : Analy Werner, kevin jade, chito chapa, nickolas sauceda. So [facility] [phone].    ATENCIÓN: Si habla español, tiene a hidalgo disposición servicios gratuitos de asistencia lingüística. Llame al [phone].    We comply with applicable federal civil rights laws and [place] laws. We do not discriminate on the basis of race, color, national origin, age, disability sex, sexual orientation or gender identity.

## 2017-09-15 ENCOUNTER — TELEPHONE (OUTPATIENT)
Dept: PSYCHIATRY | Facility: CLINIC | Age: 42
End: 2017-09-15

## 2017-09-15 ASSESSMENT — ANXIETY QUESTIONNAIRES: GAD7 TOTAL SCORE: 1

## 2017-09-15 NOTE — TELEPHONE ENCOUNTER
Reason for call:  Other   Patient called regarding (reason for call): call back  Additional comments: Appointment was cancelled with concussion clinic by staff.  Pt needs a return to work letter and was wondering if Dr. Smith would be able to do that.  Hoping for a call back.      Phone number to reach patient:  Home number on file [phone] (home)    Best Time:  asap - aware  2 business day return policy  Can we leave a detailed message on this number?  YES

## 2017-09-19 DIAGNOSIS — E78.1 HYPERTRIGLYCERIDEMIA: ICD-10-CM

## 2017-09-19 NOTE — TELEPHONE ENCOUNTER
RN spoke to patient. He stated he spoke to the concussion clinic again yesterday. His next appointment with them is 9/28/17. He no longer needs a letter for a return to work.

## 2017-09-20 RX ORDER — SIMVASTATIN 10 MG
TABLET ORAL
Qty: 90 TABLET | Refills: 0 | Status: SHIPPED | OUTPATIENT
Start: 2017-09-20 | End: 2017-09-29

## 2017-09-20 NOTE — TELEPHONE ENCOUNTER
Medication is being filled for 1 time refill only due to:    Due for physical and labs.  Patient scheduled PX for 9/29/17  Greta Bettencourt RN- Triage [employer]

## 2017-09-20 NOTE — TELEPHONE ENCOUNTER
SIMVASTATIN 10 MG TABLET     Last Written Prescription Date: 07/13/16  Last Fill Quantity: 90, # refills: 3  Last Office Visit with G, P or [facility] prescribing provider: 08/09/17       Lab Results   Component Value Date    CHOL 123 07/13/2016     Lab Results   Component Value Date    HDL 26 07/13/2016     Lab Results   Component Value Date    LDL 61 07/13/2016     Lab Results   Component Value Date    TRIG 178 07/13/2016     Lab Results   Component Value Date    CHOLHDLRATIO 5.3 03/19/2015

## 2017-09-28 ENCOUNTER — OFFICE VISIT (OUTPATIENT)
Dept: SURGERY | Facility: CLINIC | Age: 42
End: 2017-09-28

## 2017-09-28 VITALS
HEIGHT: 68 IN | DIASTOLIC BLOOD PRESSURE: 88 MMHG | BODY MASS INDEX: 38.31 KG/M2 | SYSTOLIC BLOOD PRESSURE: 142 MMHG | OXYGEN SATURATION: 97 % | WEIGHT: 252.8 LBS | HEART RATE: 71 BPM

## 2017-09-28 DIAGNOSIS — F07.81 POSTCONCUSSION SYNDROME: Primary | ICD-10-CM

## 2017-09-28 ASSESSMENT — PAIN SCALES - GENERAL: PAINLEVEL: NO PAIN (0)
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
## 2017-09-28 NOTE — PATIENT INSTRUCTIONS
You are doing great!  I am very pleased with your progress and am excited that things are coming together:    1.  Continue taking the tumeric and Bcomplex vitamins  2.  Continue utilizing the coping skills you learned in therapies  3.  Continue prioritizing self care   4.  Continue following with Dr Gamboa for your mental health needs  5.  Continue talking to [facility] for counseling needs.     MyChart us as needed!

## 2017-09-28 NOTE — NURSING NOTE
"Chief Complaint   Patient presents with     RECHECK     concussion f/u       Vitals:    09/28/17 1455   BP: 142/88   Pulse: 71   SpO2: 97%   Weight: 114.7 kg (252 lb 12.8 oz)   Height: 1.727 m (5' 8\")       Body mass index is 38.44 kg/(m^2).    Prabhjot THORPE                  "

## 2017-09-28 NOTE — LETTER
9/28/2017       RE: Gage Carter  [address] [place] [address]     Dear Colleague,    Thank you for referring your patient, Gage Carter, to the [facility] CONCUSSION at [facility]. Please see a copy of my visit note below.    [facility]cussion [facility] Follow up September 28, 2017      Assessment:  Gage Carter is a 42 year old yo male who presents for evaluation of his post concussive syndrome.  He is back to work doing three 8 hour days successfully.  He appears to be under a great deal of stress at home as well as trying to integrate successfully at work on a limited schedule.      Currently he has Tues and Thurs as recovery days.  He reports the need for about a extra 2 hour nap but is fairly functional the remainder of the day.  We will increase his hours now and see how that is tolerated.  If he can do this without a moderate increase in symptoms, we will then return him back to full time.      We discussed many of the coping skills he has learned in therapies, and how ongoing counseling would be helpful given the current stressors he is dealing with while concussed.     Of note, we attempted to call his wife to include her in the planning 6 times during the visit and were unsuccessful.      Plan:    1. Work restrictions-  Three 8 hour days, and two 4 hour days per week.      2. Follow up here in _11/9/17__ weeks.  3. Letters written today for:  work    HPI  Time/date of injury: 3/31/17  Mechanism: large rock strike to the right temporal/eye region    Symptom issues most impressive with memory loss.  Speech is better not so delayed.  Fogginess and issue if I forget my breaks.  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  Very drained by 7th period.  Signs them in and needs to be able to provide some academic support.      Doing IAP meetings consistently.  "(group planning for a student, often parent.  Facilitates them but someone else takes notes for him).  Has difficulties   8 hours M-W-F.   Tues and Thurs, kids to tano, housework. Sleep a couple hours late morning.  Run a couple errands in the afternoon.      Been to see neuropsych and Dr Smith from psychiatry.   Difficulties with stressors katey money.  Trys to use coping skills taught by OT.  Katey journal ing.  With money issues hasn't felt that he can afford the counseling.  Getting fatigued early and doesn't feel like he can exercise well now.  Doing well with diet.  Does most all the house work and  issues as wife has very time consuming position.      Pertinent social history:  Currently using alcohol:  A beer, once a month  Currently using nicotine: no  Currently using caffeine: 2 coffee/week  Currently Working:  Normal job duties entail:  Currently Living at and with: wife and 3 kids 8-8-11  Involved in what sports or activities : meet up with friends. Biked for the first time recently.     Current medications:  Reconciled in chart today by clinic staff and reviewed by me.    REVIEW OF SYSTEMS:  Refer to DocFlowsheets:  Concussion symptoms  GASTROINTESTINAL:   MUSCULOSKELETAL:   NEUROLOGIC:   PSYCHIATRIC: see PHQ-9 and MARGIE  Current sleep patterns:    OBJECTIVE:   /88  Pulse 71  Ht 5' 8\"  Wt 252 lb 12.8 oz  SpO2 97%  BMI 38.44 kg/m2    Wt Readings from Last 4 Encounters:   09/28/17 252 lb 12.8 oz   08/09/17 262 lb 4.8 oz   08/03/17 258 lb 9.6 oz   06/08/17 252 lb 3.2 oz       EXAM:  GENERAL: alert, oriented to person, place, time  Neuro:  Alert and oriented    Neurologic/Visual:  LIGIA: yes  EOMI: yes  Nystagmus: no  Painful eye movements: no  Convergence testing: Normal (</= 6 cm)    Coordination:       - Finger to Nose: normal       - Rapid Alternating Movements: normal    Balance Testing:       - Romberg: normal      Time spent in one-on-one evaluation and discussion with patient regarding " nature of problem, course, prior treatments, and therapeutic options, 90% of this 45 minute visit  was spent in counseling including this patients personal symptom triggers and education thereof.        Again, thank you for allowing me to participate in the care of your patient.      Sincerely,    STACIA Bradley CNP

## 2017-09-28 NOTE — MR AVS SNAPSHOT
After Visit Summary   9/28/2017    Gage Carter    MRN: [id number]           Patient Information     Date Of Birth          1975        Visit Information        Provider Department      9/28/2017 3:00 PM Ree Sheffield APRN CNP [facility]        Care Instructions    You are doing great!  I am very pleased with your progress and am excited that things are coming together:    1.  Continue taking the tumeric and Bcomplex vitamins  2.  Continue utilizing the coping skills you learned in therapies  3.  Continue prioritizing self care   4.  Continue following with Dr Gamboa for your mental health needs  5.  Continue talking to [facility] for counseling needs.     MyChart us as needed!          Follow-ups after your visit        Follow-up notes from your care team     Return in about 6 weeks (around 11/9/2017).      Your next 10 appointments already scheduled     Sep 29, 2017 10:00 AM CDT   [facility]   [facility]    [address] [place] [id number] [phone]              Who to contact     Please call your clinic at [phone] to:    Ask questions about your health    Make or cancel appointments    Discuss your medicines    Learn about your test results    Speak to your doctor   If you have compliments or concerns about an experience at your clinic, or if you wish to file a complaint, please contact [facility] Patient Relations at [phone] or email us at [email or web address]         Additional Information About Your Visit        MyChart Information     [employer] gives you secure access to your electronic health record. If you see a primary care provider, you can also send messages to your care team and make appointments. If you have questions, please call your primary care clinic.  If you do not have  "a primary care provider, please call [phone] and they will assist you.      [employer]*Bio is an electronic gateway that provides easy, online access to your medical records. With [employer]*Bio, you can request a clinic appointment, read your test results, renew a prescription or communicate with your care team.     To access your existing account, please contact your [facility] or call [phone] for assistance.        Care EveryWhere ID     This is your Care EveryWhere ID. This could be used by other organizations to access your [facility] medical records  [id number]        Your Vitals Were     Pulse Height Pulse Oximetry BMI (Body Mass Index)          71 5' 8\" 97% 38.44 kg/m2         Blood Pressure from Last 3 Encounters:   09/28/17 142/88   08/09/17 136/89   08/03/17 129/90    Weight from Last 3 Encounters:   09/28/17 252 lb 12.8 oz   08/09/17 262 lb 4.8 oz   08/03/17 258 lb 9.6 oz              Today, you had the following     No orders found for display       Primary Care Provider Office Phone # Fax #    Lesli Hernandes [phone]       [address] [facility] [address]        Equal Access to Services     IMELDA MAIN AH: Hadii anabela gayo Sochari, waaxda luqadaha, qaybta kaalmada adedarioyada, nickolas sauceda. So [facility] [phone].    ATENCIÓN: Si habla español, tiene a hidalgo disposición servicios gratuitos de asistencia lingüística. Massimo al [phone].    We comply with applicable federal civil rights laws and [place] laws. We do not discriminate on the basis of race, color, national origin, age, disability sex, sexual orientation or gender identity.            Thank you!     Thank you for choosing [facility]  for your care. Our goal is always to provide you with excellent care. Hearing back from our patients is one way we can continue to improve our services. Please take a few minutes to complete the written survey that you " may receive in the mail after your visit with us. Thank you!             Your Updated Medication List - Protect others around you: Learn how to safely use, store and throw away your medicines at www.disposemymeds.org.          This list is accurate as of: 9/28/17  4:05 PM.  Always use your most recent med list.                   Brand Name Dispense Instructions for use Diagnosis    IBUPROFEN PO           lamoTRIgine 25 MG tablet    LaMICtal    42 tablet    25 mg per day for two weeks, 50 mg per day for two weeks, then 100 mg per day    Adjustment disorder with anxious mood       simvastatin 10 MG tablet    ZOCOR    90 tablet    TAKE 1 TABLET (10 MG) BY MOUTH AT BEDTIME    Hypertriglyceridemia       TYLENOL PO

## 2017-09-28 NOTE — LETTER
September 28, 2017      Gage Carter  [address] [place] [address]    To Whom It May Concern:    Gage Carter, 1975, is under my care for a concussion that occurred on 3/31/17.    He:  --- May return to work as of date with the following restrictions:    1.  He may work 3 eight hour days a week, Mon, Wed, and Friday, and additionally may work 4 hour days on Tues and Thursday,7:30 - 11:30 AM until his follow up appointment 11/9/17.  2.  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  3.  He will need a lunch break of a minimum of 30 minutes in a quiet environment without noise or other stimulation.    4.  He requires limited computer/screen times and  time.      These accommodations may help in minimizing post-concussion symptoms.  As the employer, it is encouraged to discuss and establish accommodations based on individual work responsibilities.     Full or partial days missed due to post-concussion symptoms and follow up appointments should be medically excused.  Follow up evaluation and revision of recommendations to occur on 11/9/17  Please feel free to contact me at the number below with any questions or concerns.    Sincerely,        [email or web address]  Dept of Critical Care and Acute Care Surgery   [facility]  Administration office: [phone]  Clinic nurse line: [phone]  Fax: [phone]

## 2017-09-28 NOTE — PROGRESS NOTES
[facility] Concussion Clinic Follow up September 28, 2017      Assessment:  Gage Carter is a 42 year old yo male who presents for evaluation of his post concussive syndrome.  He is back to work doing three 8 hour days successfully.  He appears to be under a great deal of stress at home as well as trying to integrate successfully at work on a limited schedule.      Currently he has Tues and Thurs as recovery days.  He reports the need for about a extra 2 hour nap but is fairly functional the remainder of the day.  We will increase his hours now and see how that is tolerated.  If he can do this without a moderate increase in symptoms, we will then return him back to full time.      We discussed many of the coping skills he has learned in therapies, and how ongoing counseling would be helpful given the current stressors he is dealing with while concussed.     Of note, we attempted to call his wife to include her in the planning 6 times during the visit and were unsuccessful.      Plan:    1. Work restrictions-  Three 8 hour days, and two 4 hour days per week.      2. Follow up here in _11/9/17__ weeks.  3. Letters written today for:  work    HPI  Time/date of injury: 3/31/17  Mechanism: large rock strike to the right temporal/eye region    Symptom issues most impressive with memory loss.  Speech is better not so delayed.  Fogginess and issue if I forget my breaks.  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  Very drained by 7th period.  Signs them in and needs to be able to provide some academic support.      Doing IAP meetings consistently. (group planning for a student, often parent.  Facilitates them but someone else takes notes for him).  Has difficulties   8 hours M-W-F.   Tues and Thurs, kids to tano, housework. Sleep a couple hours late morning.  Run a couple errands in the afternoon.      Been to see neuropsych and  "Dr Smith from psychiatry.   Difficulties with stressors katey money.  Trys to use coping skills taught by OT.  Katey journal ing.  With money issues hasn't felt that he can afford the counseling.  Getting fatigued early and doesn't feel like he can exercise well now.  Doing well with diet.  Does most all the house work and  issues as wife has very time consuming position.      Pertinent social history:  Currently using alcohol:  A beer, once a month  Currently using nicotine: no  Currently using caffeine: 2 coffee/week  Currently Working:  Normal job duties entail:  Currently Living at and with: wife and 3 kids 8-8-11  Involved in what sports or activities : meet up with friends. Biked for the first time recently.     Current medications:  Reconciled in chart today by clinic staff and reviewed by me.    REVIEW OF SYSTEMS:  Refer to DocFlowsheets:  Concussion symptoms  GASTROINTESTINAL:   MUSCULOSKELETAL:   NEUROLOGIC:   PSYCHIATRIC: see PHQ-9 and MARGIE  Current sleep patterns:    OBJECTIVE:   /88  Pulse 71  Ht 5' 8\"  Wt 252 lb 12.8 oz  SpO2 97%  BMI 38.44 kg/m2    Wt Readings from Last 4 Encounters:   09/28/17 252 lb 12.8 oz   08/09/17 262 lb 4.8 oz   08/03/17 258 lb 9.6 oz   06/08/17 252 lb 3.2 oz       EXAM:  GENERAL: alert, oriented to person, place, time  Neuro:  Alert and oriented    Neurologic/Visual:  LIGIA: yes  EOMI: yes  Nystagmus: no  Painful eye movements: no  Convergence testing: Normal (</= 6 cm)    Coordination:       - Finger to Nose: normal       - Rapid Alternating Movements: normal    Balance Testing:       - Romberg: normal      Time spent in one-on-one evaluation and discussion with patient regarding nature of problem, course, prior treatments, and therapeutic options, 90% of this 45 minute visit  was spent in counseling including this patients personal symptom triggers and education thereof.      "

## 2017-09-29 ENCOUNTER — OFFICE VISIT (OUTPATIENT)
Dept: FAMILY MEDICINE | Facility: CLINIC | Age: 42
End: 2017-09-29
Payer: COMMERCIAL

## 2017-09-29 VITALS
OXYGEN SATURATION: 96 % | RESPIRATION RATE: 16 BRPM | HEART RATE: 67 BPM | SYSTOLIC BLOOD PRESSURE: 112 MMHG | HEIGHT: 68 IN | WEIGHT: 251 LBS | DIASTOLIC BLOOD PRESSURE: 74 MMHG | TEMPERATURE: 97.7 F | BODY MASS INDEX: 38.04 KG/M2

## 2017-09-29 DIAGNOSIS — Z00.00 ROUTINE GENERAL MEDICAL EXAMINATION AT A HEALTH CARE FACILITY: Primary | ICD-10-CM

## 2017-09-29 DIAGNOSIS — F39 MOOD DISORDER (H): ICD-10-CM

## 2017-09-29 DIAGNOSIS — E66.01 MORBID OBESITY DUE TO EXCESS CALORIES (H): ICD-10-CM

## 2017-09-29 DIAGNOSIS — D84.9 IMMUNE DEFICIENCY DISORDER (H): ICD-10-CM

## 2017-09-29 DIAGNOSIS — E78.1 HYPERTRIGLYCERIDEMIA: ICD-10-CM

## 2017-09-29 DIAGNOSIS — S06.0X0S CONCUSSION WITHOUT LOSS OF CONSCIOUSNESS, SEQUELA (H): ICD-10-CM

## 2017-09-29 DIAGNOSIS — F33.1 MODERATE EPISODE OF RECURRENT MAJOR DEPRESSIVE DISORDER (H): ICD-10-CM

## 2017-09-29 LAB
BASOPHILS # BLD AUTO: 0 10E9/L (ref 0–0.2)
BASOPHILS NFR BLD AUTO: 0.3 %
DIFFERENTIAL METHOD BLD: NORMAL
EOSINOPHIL # BLD AUTO: 0.2 10E9/L (ref 0–0.7)
EOSINOPHIL NFR BLD AUTO: 2.7 %
ERYTHROCYTE [DISTWIDTH] IN BLOOD BY AUTOMATED COUNT: 12.6 % (ref 10–15)
HCT VFR BLD AUTO: 44.7 % (ref 40–53)
HGB BLD-MCNC: 16 G/DL (ref 13.3–17.7)
LYMPHOCYTES # BLD AUTO: 2.6 10E9/L (ref 0.8–5.3)
LYMPHOCYTES NFR BLD AUTO: 43.8 %
MCH RBC QN AUTO: 30.9 PG (ref 26.5–33)
MCHC RBC AUTO-ENTMCNC: 35.8 G/DL (ref 31.5–36.5)
MCV RBC AUTO: 86 FL (ref 78–100)
MONOCYTES # BLD AUTO: 0.5 10E9/L (ref 0–1.3)
MONOCYTES NFR BLD AUTO: 9.1 %
NEUTROPHILS # BLD AUTO: 2.6 10E9/L (ref 1.6–8.3)
NEUTROPHILS NFR BLD AUTO: 44.1 %
PLATELET # BLD AUTO: 218 10E9/L (ref 150–450)
RBC # BLD AUTO: 5.18 10E12/L (ref 4.4–5.9)
WBC # BLD AUTO: 5.9 10E9/L (ref 4–11)

## 2017-09-29 PROCEDURE — 87389 HIV-1 AG W/HIV-1&-2 AB AG IA: CPT | Performed by: FAMILY MEDICINE

## 2017-09-29 PROCEDURE — 99396 PREV VISIT EST AGE 40-64: CPT | Performed by: FAMILY MEDICINE

## 2017-09-29 PROCEDURE — 99213 OFFICE O/P EST LOW 20 MIN: CPT | Mod: 25 | Performed by: FAMILY MEDICINE

## 2017-09-29 PROCEDURE — 85025 COMPLETE CBC W/AUTO DIFF WBC: CPT | Performed by: FAMILY MEDICINE

## 2017-09-29 PROCEDURE — 82947 ASSAY GLUCOSE BLOOD QUANT: CPT | Performed by: FAMILY MEDICINE

## 2017-09-29 PROCEDURE — 80061 LIPID PANEL: CPT | Performed by: FAMILY MEDICINE

## 2017-09-29 PROCEDURE — 86780 TREPONEMA PALLIDUM: CPT | Performed by: FAMILY MEDICINE

## 2017-09-29 PROCEDURE — 36415 COLL VENOUS BLD VENIPUNCTURE: CPT | Performed by: FAMILY MEDICINE

## 2017-09-29 RX ORDER — SIMVASTATIN 10 MG
10 TABLET ORAL AT BEDTIME
Qty: 90 TABLET | Refills: 3 | Status: SHIPPED | OUTPATIENT
Start: 2017-09-29 | End: 2023-07-12

## 2017-09-29 NOTE — PROGRESS NOTES
SUBJECTIVE:   CC: Gage Carter is an 42 year old male who presents for preventative health visit.     Physical   Annual:     Getting at least 3 servings of Calcium per day::  Yes    Bi-annual eye exam::  Yes    Dental care twice a year::  Yes    Sleep apnea or symptoms of sleep apnea::  Sleep apnea    Diet::  Regular (no restrictions)    Frequency of exercise::  1 day/week    Duration of exercise::  30-45 minutes    Taking medications regularly::  Yes    Medication side effects::  None    42 year old with long, complex past medical history and mental health history here today for physical, but also wants to update me on concussion management.  Had psych eval, concussion eval, and neuropsych eval all of which I reviewed.  Some questions with neuropsych eval of emotional/behavioral health affecting cogntive functioning, Gage disagrees with this assessment.        Today's PHQ-2 Score:   PHQ-2 ( 1999 [employer]) 9/29/2017   Q1: Little interest or pleasure in doing things 0   Q2: Feeling down, depressed or hopeless 0   PHQ-2 Score 0   Q1: Little interest or pleasure in doing things Not at all   Q2: Feeling down, depressed or hopeless Not at all   PHQ-2 Score 0       Abuse: Current or Past(Physical, Sexual or Emotional)- No  Do you feel safe in your environment - Yes    Social History   Substance Use Topics     Smoking status: Never Smoker     Smokeless tobacco: Never Used     Alcohol use 0.0 oz/week     0 Standard drinks or equivalent per week      Comment: rarely     The patient does not drink >3 drinks per day nor >7 drinks per week.    Last PSA: No results found for: PSA    Reviewed orders with patient. Reviewed health maintenance and updated orders accordingly - Yes  Labs reviewed in EPIC  BP Readings from Last 3 Encounters:   09/29/17 112/74   09/28/17 142/88   08/09/17 136/89    Wt Readings from Last 3 Encounters:   09/29/17 251 lb (113.9 kg)   09/28/17 252 lb 12.8 oz (114.7 kg)   08/09/17 262 lb 4.8 oz (119  "kg)                      Reviewed and updated as needed this visit by clinical staffTobacco  Allergies  Med Hx  Surg Hx  Fam Hx  Soc Hx        Reviewed and updated as needed this visit by Provider              ROS:  C: NEGATIVE for fever, chills, change in weight  I: NEGATIVE for worrisome rashes, moles or lesions  E: NEGATIVE for vision changes or irritation  ENT: NEGATIVE for ear, mouth and throat problems  R: NEGATIVE for significant cough or SOB  CV: NEGATIVE for chest pain, palpitations or peripheral edema  GI: NEGATIVE for nausea, abdominal pain, heartburn, or change in bowel habits   male: negative for dysuria, hematuria, decreased urinary stream, erectile dysfunction, urethral discharge  M: NEGATIVE for significant arthralgias or myalgia  N: NEGATIVE for weakness, dizziness or paresthesias  P: NEGATIVE for changes in mood or affect    OBJECTIVE:   /74  Pulse 67  Temp 97.7  F (36.5  C) (Tympanic)  Resp 16  Ht 5' 8\" (1.727 m)  Wt 251 lb (113.9 kg)  SpO2 96%  BMI 38.16 kg/m2    EXAM:  GENERAL: healthy, alert and no distress  EYES: Eyes grossly normal to inspection, PERRL and conjunctivae and sclerae normal  HENT: ear canals and TM's normal, nose and mouth without ulcers or lesions  NECK: no adenopathy, no asymmetry, masses, or scars and thyroid normal to palpation  RESP: lungs clear to auscultation - no rales, rhonchi or wheezes  CV: regular rate and rhythm, normal S1 S2, no S3 or S4, no murmur, click or rub, no peripheral edema and peripheral pulses strong  ABDOMEN: soft, nontender, no hepatosplenomegaly, no masses and bowel sounds normal  MS: no gross musculoskeletal defects noted, no edema  SKIN: no suspicious lesions or rashes  NEURO: Normal strength and tone, mentation intact and speech normal  PSYCH: concentration poor, inattentive, tangential, affect flat, anxious and judgement and insight impaired    ASSESSMENT/PLAN:       ICD-10-CM    1. Routine general medical examination at a " "health care facility Z00.00 GLUCOSE     Lipid panel reflex to direct LDL     CBC with platelets and differential     Anti Treponema     HIV Antigen Antibody Combo   2. BMI 38 with hyperlipid (H) E66.01    3. Hypertriglyceridemia E78.1 simvastatin (ZOCOR) 10 MG tablet   4. Concussion without loss of consciousness, sequela (H) S06.0X0S    5. Immune deficiency disorder (H) D84.9    6. Mood disorder (H) F39     Following with Dr. Smith.  Start lamictal 9/2017   7. Moderate episode of recurrent major depressive disorder (H) F33.1      Discussed weight loss - will work on with shakes and exercise.  Checked lipids and refilled zocor.  For mental health - reviewed psych and neuropsych and concussion notes.  I think it might be worthwhile checked STI testing - hasn't been checked as far as I can see and could potentially be affecting neuro function.  Likely will be negative, but will check to rule out.      COUNSELING:   Reviewed preventive health counseling, as reflected in patient instructions           reports that he has never smoked. He has never used smokeless tobacco.      Estimated body mass index is 38.16 kg/(m^2) as calculated from the following:    Height as of this encounter: 5' 8\" (1.727 m).    Weight as of this encounter: 251 lb (113.9 kg).   Weight management plan: Discussed healthy diet and exercise guidelines and patient will follow up in 12 months in clinic to re-evaluate.    Counseling Resources:  ATP IV Guidelines  Pooled Cohorts Equation Calculator  FRAX Risk Assessment  ICSI Preventive Guidelines  Dietary Guidelines for Americans, 2010  USDA's MyPlate  ASA Prophylaxis  Lung CA Screening    I spent over 15 minutes in direct patient care and counseling outside of time for CPE, specifically in concussion and mental health discussion above.    Lesli Hernandes MD  [facility]  Answers for HPI/ROS submitted by the patient on 9/29/2017   PHQ-2 Score: 0    "

## 2017-09-29 NOTE — NURSING NOTE
"Chief Complaint   Patient presents with     Physical       Initial /74  Pulse 67  Temp 97.7  F (36.5  C) (Tympanic)  Resp 16  Ht 5' 8\" (1.727 m)  Wt 251 lb (113.9 kg)  SpO2 96%  BMI 38.16 kg/m2 Estimated body mass index is 38.16 kg/(m^2) as calculated from the following:    Height as of this encounter: 5' 8\" (1.727 m).    Weight as of this encounter: 251 lb (113.9 kg).  Medication Reconciliation: complete   .Mer BARNETT      "
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
## 2017-09-29 NOTE — LETTER
My Depression Action Plan  Name: Gage Carter   Date of Birth 1975  Date: 9/29/2017    My doctor: Lesli Hernandes   My clinic: [address]  [address]  [facility] [address] [phone]          GREEN    ZONE   Good Control    What it looks like:     Things are going generally well. You have normal up s and down s. You may even feel depressed from time to time, but bad moods usually last less than a day.   What you need to do:  1. Continue to care for yourself (see self care plan)  2. Check your depression survival kit and update it as needed  3. Follow your physician s recommendations including any medication.  4. Do not stop taking medication unless you consult with your physician first.           YELLOW         ZONE Getting Worse    What it looks like:     Depression is starting to interfere with your life.     It may be hard to get out of bed; you may be starting to isolate yourself from others.    Symptoms of depression are starting to last most all day and this has happened for several days.     You may have suicidal thoughts but they are not constant.   What you need to do:     1. Call your care team, your response to treatment will improve if you keep your care team informed of your progress. Yellow periods are signs an adjustment may need to be made.     2. Continue your self-care, even if you have to fake it!    3. Talk to someone in your support network    4. Open up your depression survival kit           RED    ZONE Medical Alert - Get Help    What it looks like:     Depression is seriously interfering with your life.     You may experience these or other symptoms: You can t get out of bed most days, can t work or engage in other necessary activities, you have trouble taking care of basic hygiene, or basic responsibilities, thoughts of suicide or death that will not go away, self-injurious behavior.     What you need to  do:  1. Call your care team and request a same-day appointment. If they are not available (weekends or after hours) call your local crisis line, emergency room or 911.      Electronically signed by: Gem Gottlieb, September 29, 2017    Depression Self Care Plan / Survival Kit    Self-Care for Depression  Here s the deal. Your body and mind are really not as separate as most people think.  What you do and think affects how you feel and how you feel influences what you do and think. This means if you do things that people who feel good do, it will help you feel better.  Sometimes this is all it takes.  There is also a place for medication and therapy depending on how severe your depression is, so be sure to consult with your medical provider and/ or Behavioral Health Consultant if your symptoms are worsening or not improving.     In order to better manage my stress, I will:    Exercise  Get some form of exercise, every day. This will help reduce pain and release endorphins, the  feel good  chemicals in your brain. This is almost as good as taking antidepressants!  This is not the same as joining a gym and then never going! (they count on that by the way ) It can be as simple as just going for a walk or doing some gardening, anything that will get you moving.      Hygiene   Maintain good hygiene (Get out of bed in the morning, Make your bed, Brush your teeth, Take a shower, and Get dressed like you were going to work, even if you are unemployed).  If your clothes don't fit try to get ones that do.    Diet  I will strive to eat foods that are good for me, drink plenty of water, and avoid excessive sugar, caffeine, alcohol, and other mood-altering substances.  Some foods that are helpful in depression are: complex carbohydrates, B vitamins, flaxseed, fish or fish oil, fresh fruits and vegetables.    Psychotherapy  I agree to participate in Individual Therapy (if recommended).    Medication  If prescribed medications,  I agree to take them.  Missing doses can result in serious side effects.  I understand that drinking alcohol, or other illicit drug use, may cause potential side effects.  I will not stop my medication abruptly without first discussing it with my provider.    Staying Connected With Others  I will stay in touch with my friends, family members, and my primary care provider/team.    Use your imagination  Be creative.  We all have a creative side; it doesn t matter if it s oil painting, sand castles, or mud pies! This will also kick up the endorphins.    Witness Beauty  (AKA stop and smell the roses) Take a look outside, even in mid-winter. Notice colors, textures. Watch the squirrels and birds.     Service to others  Be of service to others.  There is always someone else in need.  By helping others we can  get out of ourselves  and remember the really important things.  This also provides opportunities for practicing all the other parts of the program.    Humor  Laugh and be silly!  Adjust your TV habits for less news and crime-drama and more comedy.    Control your stress  Try breathing deep, massage therapy, biofeedback, and meditation. Find time to relax each day.     My support system    Clinic Contact:  Phone number:    Contact 1:  Phone number:    Contact 2:  Phone number:    Presybeterian/:  Phone number:    Therapist:  Phone number:    Local crisis center:    Phone number:    Other community support:  Phone number:

## 2017-09-30 LAB
CHOLEST SERPL-MCNC: 138 MG/DL
GLUCOSE SERPL-MCNC: 89 MG/DL (ref 70–99)
HDLC SERPL-MCNC: 26 MG/DL
LDLC SERPL CALC-MCNC: 63 MG/DL
NONHDLC SERPL-MCNC: 112 MG/DL
T PALLIDUM IGG+IGM SER QL: NEGATIVE
TRIGL SERPL-MCNC: 244 MG/DL

## 2017-10-02 LAB — HIV 1+2 AB+HIV1 P24 AG SERPL QL IA: NONREACTIVE

## 2017-10-03 NOTE — PROGRESS NOTES
Good news Gage.  Those tests for infection are all negative.  Your cholesterol looks about the same - no changes.  Let me know if you have any questions about this.  Dr. Lesli Hernandes MD  [facility]  [phone]

## 2017-10-24 ENCOUNTER — HOSPITAL ENCOUNTER (OUTPATIENT)
Dept: OCCUPATIONAL THERAPY | Facility: CLINIC | Age: 42
Setting detail: THERAPIES SERIES
End: 2017-10-24
Attending: PHYSICIAN ASSISTANT
Payer: COMMERCIAL

## 2017-10-24 PROCEDURE — 97535 SELF CARE MNGMENT TRAINING: CPT | Mod: GO | Performed by: REHABILITATION PRACTITIONER

## 2017-10-24 PROCEDURE — 40000768 ZZHC STATISTIC OT CONCUSSION VISIT: Performed by: REHABILITATION PRACTITIONER

## 2017-11-01 ENCOUNTER — TELEPHONE (OUTPATIENT)
Dept: FAMILY MEDICINE | Facility: CLINIC | Age: 42
End: 2017-11-01

## 2017-11-01 NOTE — TELEPHONE ENCOUNTER
"  Panel Management Review      Patient has the following on his problem list: {Problems to Review:017167}      Composite cancer screening  Chart review shows that this patient is due/due soon for the following {CANCER SCREENIN}  Summary:    Patient is due/failing the following:   {BVC DUE ITEMS:618557}    Action needed:   {[facility] ACTION:497881}    Type of outreach:    {BVCPTCONTACT:360706}    Questions for provider review:    {NONE:759682::\"None\"}                                                                                                                                    {staff signature}     Chart routed to {Care Team / Provider:852404} .        "

## 2017-11-09 ENCOUNTER — OFFICE VISIT (OUTPATIENT)
Dept: SURGERY | Facility: CLINIC | Age: 42
End: 2017-11-09

## 2017-11-09 VITALS
OXYGEN SATURATION: 98 % | WEIGHT: 253.7 LBS | HEIGHT: 68 IN | BODY MASS INDEX: 38.45 KG/M2 | HEART RATE: 72 BPM | SYSTOLIC BLOOD PRESSURE: 132 MMHG | DIASTOLIC BLOOD PRESSURE: 89 MMHG

## 2017-11-09 DIAGNOSIS — F43.20 ADJUSTMENT DISORDER, UNSPECIFIED TYPE: Primary | ICD-10-CM

## 2017-11-09 RX ORDER — LAMOTRIGINE 100 MG/1
TABLET ORAL
Qty: 60 TABLET | Refills: 0 | Status: SHIPPED | OUTPATIENT
Start: 2017-11-09 | End: 2017-11-21

## 2017-11-09 ASSESSMENT — ANXIETY QUESTIONNAIRES
2. NOT BEING ABLE TO STOP OR CONTROL WORRYING: NOT AT ALL
1. FEELING NERVOUS, ANXIOUS, OR ON EDGE: NOT AT ALL
3. WORRYING TOO MUCH ABOUT DIFFERENT THINGS: SEVERAL DAYS
IF YOU CHECKED OFF ANY PROBLEMS ON THIS QUESTIONNAIRE, HOW DIFFICULT HAVE THESE PROBLEMS MADE IT FOR YOU TO DO YOUR WORK, TAKE CARE OF THINGS AT HOME, OR GET ALONG WITH OTHER PEOPLE: SOMEWHAT DIFFICULT
7. FEELING AFRAID AS IF SOMETHING AWFUL MIGHT HAPPEN: NOT AT ALL
6. BECOMING EASILY ANNOYED OR IRRITABLE: SEVERAL DAYS
5. BEING SO RESTLESS THAT IT IS HARD TO SIT STILL: NOT AT ALL
GAD7 TOTAL SCORE: 2

## 2017-11-09 ASSESSMENT — PAIN SCALES - GENERAL: PAINLEVEL: MILD PAIN (2)

## 2017-11-09 ASSESSMENT — PATIENT HEALTH QUESTIONNAIRE - PHQ9
SUM OF ALL RESPONSES TO PHQ QUESTIONS 1-9: 2
5. POOR APPETITE OR OVEREATING: NOT AT ALL
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
## 2017-11-09 NOTE — PATIENT INSTRUCTIONS
1.  Ok to return to work full time    2.  Make your follow up appointment with Dr Smith prior to leaving today    3.  Please consider counseling for yourself, and your family.     4.  You do not need to return to see me unless you need help with something.  Things will continue to slowly improve.  Continue taking ALL of your medications as prescribed and do not wean off of them without the assistance of a medical provider.       Your directions for your lamictal are this: (lamictal 100 mg tablets)    Take 1/2 tablet daily for 2 weeks, (50 mg)  Then 1 tablet daily for 2 weeks, (100 mg)  Then take 1& 1/2 tablets daily (150 mg)
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
## 2017-11-09 NOTE — MR AVS SNAPSHOT
After Visit Summary   11/9/2017    Gage Carter    MRN: [id number]           Patient Information     Date Of Birth          1975        Visit Information        Provider Department      11/9/2017 11:00 AM Ree Sheffield APRN CNP [facility]        Today's Diagnoses     Adjustment disorder, unspecified type    -  1      Care Instructions    1.  Ok to return to work full time    2.  Make your follow up appointment with Dr Smith prior to leaving today    3.  Please consider counseling for yourself, and your family.     4.  You do not need to return to see me unless you need help with something.  Things will continue to slowly improve.  Continue taking ALL of your medications as prescribed and do not wean off of them without the assistance of a medical provider.       Your directions for your lamictal are this: (lamictal 100 mg tablets)    Take 1/2 tablet daily for 2 weeks, (50 mg)  Then 1 tablet daily for 2 weeks, (100 mg)  Then take 1& 1/2 tablets daily (150 mg)          Follow-ups after your visit        Follow-up notes from your care team     Return if symptoms worsen or fail to improve.      Who to contact     Please call your clinic at [phone] to:    Ask questions about your health    Make or cancel appointments    Discuss your medicines    Learn about your test results    Speak to your doctor   If you have compliments or concerns about an experience at your clinic, or if you wish to file a complaint, please contact [facility] Patient Relations at [phone] or email us at [email or web address]         Additional Information About Your Visit        MyChart Information     [employer] gives you secure access to your electronic health record. If you see a primary care provider, you can also send messages to your care team and make appointments. If you have questions, please call your primary care clinic.  If you do not have a primary  "care provider, please call [phone] and they will assist you.      Gratci is an electronic gateway that provides easy, online access to your medical records. With Gratci, you can request a clinic appointment, read your test results, renew a prescription or communicate with your care team.     To access your existing account, please contact your [facility] or call [phone] for assistance.        Care EveryWhere ID     This is your Care EveryWhere ID. This could be used by other organizations to access your [facility] medical records  [id number]        Your Vitals Were     Pulse Height Pulse Oximetry BMI (Body Mass Index)          72 5' 8\" 98% 38.57 kg/m2         Blood Pressure from Last 3 Encounters:   11/09/17 132/89   09/29/17 112/74   09/28/17 142/88    Weight from Last 3 Encounters:   11/09/17 253 lb 11.2 oz   09/29/17 251 lb   09/28/17 252 lb 12.8 oz              Today, you had the following     No orders found for display         Today's Medication Changes          These changes are accurate as of: 11/9/17 12:03 PM.  If you have any questions, ask your nurse or doctor.               These medicines have changed or have updated prescriptions.        Dose/Directions    * lamoTRIgine 25 MG tablet   Commonly known as:  LaMICtal   This may have changed:  Another medication with the same name was added. Make sure you understand how and when to take each.   Used for:  Adjustment disorder with anxious mood        25 mg per day for two weeks, 50 mg per day for two weeks, then 100 mg per day   Quantity:  42 tablet   Refills:  0       * lamoTRIgine 100 MG tablet   Commonly known as:  LAMICTAL   This may have changed:  You were already taking a medication with the same name, and this prescription was added. Make sure you understand how and when to take each.   Used for:  Adjustment disorder, unspecified type        Take 1/2 tablet daily for 2 weeks, then 1 tablet daily for 2 weeks, " then take 1& 1/2 tablets daily   Quantity:  60 tablet   Refills:  0       * Notice:  This list has 2 medication(s) that are the same as other medications prescribed for you. Read the directions carefully, and ask your doctor or other care provider to review them with you.         Where to get your medicines      These medications were sent to [facility]/pharmacy #[id number] - [place], [place] - [address]  [address] [address]     Phone:  [phone]     lamoTRIgine 100 MG tablet                Primary Care Provider Office Phone # Fax #    Lesli Linda Hernandes [phone] [address] [address]        Equal Access to Services     [facility] : Analy Werner, waallison jade, chito chapa, nickolas johnson . So [facility] [phone].    ATENCIÓN: Si habla español, tiene a hidalgo disposición servicios gratuitos de asistencia lingüística. Ll[facility] [phone].    We comply with applicable federal civil rights laws and [place] laws. We do not discriminate on the basis of race, color, national origin, age, disability, sex, sexual orientation, or gender identity.            Thank you!     Thank you for choosing [facility] your care. Our goal is always to provide you with excellent care. Hearing back from our patients is one way we can continue to improve our services. Please take a few minutes to complete the written survey that you may receive in the mail after your visit with us. Thank you!             Your Updated Medication List - Protect others around you: Learn how to safely use, store and throw away your medicines at www.disposemymeds.org.          This list is accurate as of: 11/9/17 12:03 PM.  Always use your most recent med list.                   Brand Name Dispense Instructions for use Diagnosis    IBUPROFEN PO           * lamoTRIgine 25 MG tablet    LaMICtal    42 tablet    25 mg per day for two weeks, 50 mg per  day for two weeks, then 100 mg per day    Adjustment disorder with anxious mood       * lamoTRIgine 100 MG tablet    LAMICTAL    60 tablet    Take 1/2 tablet daily for 2 weeks, then 1 tablet daily for 2 weeks, then take 1& 1/2 tablets daily    Adjustment disorder, unspecified type       simvastatin 10 MG tablet    ZOCOR    90 tablet    Take 1 tablet (10 mg) by mouth At Bedtime    Hypertriglyceridemia       TYLENOL PO           * Notice:  This list has 2 medication(s) that are the same as other medications prescribed for you. Read the directions carefully, and ask your doctor or other care provider to review them with you.

## 2017-11-10 ASSESSMENT — ANXIETY QUESTIONNAIRES: GAD7 TOTAL SCORE: 2

## 2017-11-14 NOTE — PROGRESS NOTES
Outpatient Speech Language Pathology Discharge Note     Patient: Gage Carter  : 1975    Beginning/End Dates of Reporting Period:  17 - 17, total of 4 visits    Referring Provider: Oralia Barlow NP    Therapy Diagnosis: minimal expressive and receptive language deficits    Client Self Report: At last visit on 17, Pt thought process somewhat erratic at times, had difficulty focusing on one topic at a time, frequent change to topic and fast rate of talking.     Objective Measurements: See below:     Goals:  Goal Identifier Verbal expression   Goal Description To improve verbal expression for everyday and professional language tasks, patient will learn and implement 2 strategies to support word-finding across 4/5 communication breakdowns across 1 week of conversation independently, per patient report.    Target Date 17   Date Met      Progress: Goal not met and dismissed.      Goal Identifier Reading   Goal Description To improve reading function for everyday tasks, patient will implement use of 2 reading strategies to read a complex page-length document and answer questions accurately about the content in 90% of opportunities independently.    Target Date 17   Date Met      Progress: Goal not met and dismissed.     Goal Identifier Listening   Goal Description To improve listening for everyday tasks, patient will complete a complex auditory comprehension task (e.g. listen to a 2 minute story or conversation) and recall details to answer questions immediately with 90% accy independently.    Target Date 17   Date Met      Progress: Goal not met and dismissed.     Progress Toward Goals:    Progress this reporting period: No final data available on progress. Patient did not return to clinic for final visits. Only attended evaluation and 3 treatment sessions in total.     Plan: Discharge from therapy.    Discharge:  Yes.     Reason for Discharge: Patient cancelled all  future SLP visits before d/c.  He has failed to reschedule appointments.    Discharge Plan: No further SLP at this time.     Thank you for your referral of this patient.      Cara Read MA, CCC-SLP  Speech-Language Pathology  [facility]  Phone: [phone]  Pager: [phone]

## 2017-11-16 ENCOUNTER — OFFICE VISIT (OUTPATIENT)
Dept: PSYCHIATRY | Facility: CLINIC | Age: 42
End: 2017-11-16
Payer: COMMERCIAL

## 2017-11-16 DIAGNOSIS — F33.42 MAJOR DEPRESSIVE DISORDER, RECURRENT EPISODE, IN FULL REMISSION (H): Primary | ICD-10-CM

## 2017-11-16 PROCEDURE — 99214 OFFICE O/P EST MOD 30 MIN: CPT | Performed by: PSYCHIATRY & NEUROLOGY

## 2017-11-16 RX ORDER — LAMOTRIGINE 100 MG/1
100 TABLET ORAL DAILY
Qty: 90 TABLET | Refills: 1 | Status: SHIPPED | OUTPATIENT
Start: 2017-11-16 | End: 2017-11-16

## 2017-11-16 RX ORDER — LAMOTRIGINE 100 MG/1
100 TABLET ORAL DAILY
Qty: 90 TABLET | Refills: 1 | Status: SHIPPED | OUTPATIENT
Start: 2017-11-16 | End: 2019-09-25

## 2017-11-16 ASSESSMENT — ANXIETY QUESTIONNAIRES
6. BECOMING EASILY ANNOYED OR IRRITABLE: SEVERAL DAYS
2. NOT BEING ABLE TO STOP OR CONTROL WORRYING: NOT AT ALL
3. WORRYING TOO MUCH ABOUT DIFFERENT THINGS: NOT AT ALL
5. BEING SO RESTLESS THAT IT IS HARD TO SIT STILL: NOT AT ALL
GAD7 TOTAL SCORE: 1
1. FEELING NERVOUS, ANXIOUS, OR ON EDGE: NOT AT ALL
7. FEELING AFRAID AS IF SOMETHING AWFUL MIGHT HAPPEN: NOT AT ALL

## 2017-11-16 ASSESSMENT — PATIENT HEALTH QUESTIONNAIRE - PHQ9
SUM OF ALL RESPONSES TO PHQ QUESTIONS 1-9: 2
5. POOR APPETITE OR OVEREATING: NOT AT ALL

## 2017-11-16 NOTE — PATIENT INSTRUCTIONS
Treatment Plan:  After one week, increase Lamictal (lamotrigine) from 50 mg to 100 mg per day. If still having trouble with irritability call for dose of 150 to 200 mg   Continue with vitamin D   Safety plan was reviewed; to the ER as needed or call after hours crisis line; [phone]  Education and counseling was done regarding use of medications, psychotherapy options  Follow up with Lesli Hernandes for medication management and refills.

## 2017-11-16 NOTE — MR AVS SNAPSHOT
MRN:[id number]                      After Visit Summary   11/16/2017    Gage Carter    MRN: [id number]           Visit Information        Provider Department      11/16/2017 12:00 PM Rahul Smith MD [facility]        Care Instructions          Treatment Plan:  After one week, increase Lamictal (lamotrigine) from 50 mg to 100 mg per day. If still having trouble with irritability call for dose of 150 to 200 mg   Continue with vitamin D   Safety plan was reviewed; to the ER as needed or call after hours crisis line; [phone]  Education and counseling was done regarding use of medications, psychotherapy options  Follow up with Lesli Hernandes for medication management and refills.          [employer] Information     [employer] gives you secure access to your electronic health record. If you see a primary care provider, you can also send messages to your care team and make appointments. If you have questions, please call your primary care clinic.  If you do not have a primary care provider, please call [phone] and they will assist you.        Care EveryWhere ID     This is your Care EveryWhere ID. This could be used by other organizations to access your [facility] medical records  [id number]        Equal Access to Services     [facility] : Analy Werner, kevin jade, nickolas alvarado. So [facility] [phone].    ATENCIÓN: Si habla español, tiene a hidalgo disposición servicios gratuitos de asistencia lingüística. Birgitame al [phone].    We comply with applicable federal civil rights laws and [place] laws. We do not discriminate on the basis of race, color, national origin, age, disability, sex, sexual orientation, or gender identity.
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
## 2017-11-17 ASSESSMENT — ANXIETY QUESTIONNAIRES: GAD7 TOTAL SCORE: 1

## 2017-11-21 ENCOUNTER — OFFICE VISIT (OUTPATIENT)
Dept: FAMILY MEDICINE | Facility: CLINIC | Age: 42
End: 2017-11-21
Payer: COMMERCIAL

## 2017-11-21 VITALS
BODY MASS INDEX: 36.9 KG/M2 | RESPIRATION RATE: 16 BRPM | HEART RATE: 96 BPM | WEIGHT: 243.5 LBS | DIASTOLIC BLOOD PRESSURE: 78 MMHG | TEMPERATURE: 97.3 F | OXYGEN SATURATION: 99 % | SYSTOLIC BLOOD PRESSURE: 134 MMHG | HEIGHT: 68 IN

## 2017-11-21 DIAGNOSIS — K52.9 GASTROENTERITIS: Primary | ICD-10-CM

## 2017-11-21 DIAGNOSIS — F33.1 MODERATE EPISODE OF RECURRENT MAJOR DEPRESSIVE DISORDER (H): ICD-10-CM

## 2017-11-21 LAB
C COLI+JEJUNI+LARI FUSA STL QL NAA+PROBE: NOT DETECTED
C DIFF TOX B STL QL: NEGATIVE
EC STX1 GENE STL QL NAA+PROBE: NOT DETECTED
EC STX2 GENE STL QL NAA+PROBE: NOT DETECTED
ENTERIC PATHOGEN COMMENT: NORMAL
NOROV GI+II ORF1-ORF2 JNC STL QL NAA+PR: NOT DETECTED
RVA NSP5 STL QL NAA+PROBE: NOT DETECTED
SALMONELLA SP RPOD STL QL NAA+PROBE: NOT DETECTED
SHIGELLA SP+EIEC IPAH STL QL NAA+PROBE: NOT DETECTED
SPECIMEN SOURCE: NORMAL
V CHOL+PARA RFBL+TRKH+TNAA STL QL NAA+PR: NOT DETECTED
Y ENTERO RECN STL QL NAA+PROBE: NOT DETECTED

## 2017-11-21 PROCEDURE — 87493 C DIFF AMPLIFIED PROBE: CPT | Performed by: FAMILY MEDICINE

## 2017-11-21 PROCEDURE — 80053 COMPREHEN METABOLIC PANEL: CPT | Performed by: FAMILY MEDICINE

## 2017-11-21 PROCEDURE — 99214 OFFICE O/P EST MOD 30 MIN: CPT | Performed by: FAMILY MEDICINE

## 2017-11-21 PROCEDURE — 87506 IADNA-DNA/RNA PROBE TQ 6-11: CPT | Performed by: FAMILY MEDICINE

## 2017-11-21 PROCEDURE — 87209 SMEAR COMPLEX STAIN: CPT | Performed by: FAMILY MEDICINE

## 2017-11-21 PROCEDURE — 87177 OVA AND PARASITES SMEARS: CPT | Performed by: FAMILY MEDICINE

## 2017-11-21 PROCEDURE — 36415 COLL VENOUS BLD VENIPUNCTURE: CPT | Performed by: FAMILY MEDICINE

## 2017-11-21 NOTE — MR AVS SNAPSHOT
"              After Visit Summary   11/21/2017    Gage Carter    MRN: [id number]           Patient Information     Date Of Birth          1975        Visit Information        Provider Department      11/21/2017 11:00 AM Serena Ko MD [facility]        Today's Diagnoses     Gastroenteritis    -  1    Moderate episode of recurrent major depressive disorder (H)           Follow-ups after your visit        Who to contact     If you have questions or need follow up information about today's clinic visit or your schedule please contact [facility] directly at [phone].  Normal or non-critical lab and imaging results will be communicated to you by [employer], letter or phone within 4 business days after the clinic has received the results. If you do not hear from us within 7 days, please contact the clinic through Kwanjit or phone. If you have a critical or abnormal lab result, we will notify you by phone as soon as possible.  Submit refill requests through [email or web address] Application or call your pharmacy and they will forward the refill request to us. Please allow 3 business days for your refill to be completed.          Additional Information About Your Visit        MyChart Information     [email or web address] Application gives you secure access to your electronic health record. If you see a primary care provider, you can also send messages to your care team and make appointments. If you have questions, please call your primary care clinic.  If you do not have a primary care provider, please call [phone] and they will assist you.        Care EveryWhere ID     This is your Care EveryWhere ID. This could be used by other organizations to access your [place] medical records  [id number]        Your Vitals Were     Pulse Temperature Respirations Height Pulse Oximetry BMI (Body Mass Index)    96 97.3  F (36.3  C) (Oral) 16 5' 8\" (1.727 m) 99% 37.02 kg/m2       Blood Pressure from Last 3 Encounters:   11/21/17 134/78 "   11/09/17 132/89   09/29/17 112/74    Weight from Last 3 Encounters:   11/21/17 243 lb 8 oz (110.5 kg)   11/09/17 253 lb 11.2 oz (115.1 kg)   09/29/17 251 lb (113.9 kg)              We Performed the Following     Clostridium difficile toxin B PCR     Comprehensive metabolic panel     Enteric Bacteria and Virus Panel by NICO Stool     Ova and Parasite Exam Routine        Primary Care Provider Office Phone # Fax #    Lesli Linda Hernandes [phone] [address] [address]        Equal Access to Services     [facility]: Hadii aad ku hadasho Soomaali, waaxda luqadaha, qaybta kaalmada sammi, nickolas johnson . So [facility] [phone].    ATENCIÓN: Si habla español, tiene a hidalgo disposición servicios gratuitos de asistencia lingüística. Llame al [phone].    We comply with applicable federal civil rights laws and [place] laws. We do not discriminate on the basis of race, color, national origin, age, disability, sex, sexual orientation, or gender identity.            Thank you!     Thank you for choosing [facility]  for your care. Our goal is always to provide you with excellent care. Hearing back from our patients is one way we can continue to improve our services. Please take a few minutes to complete the written survey that you may receive in the mail after your visit with us. Thank you!             Your Updated Medication List - Protect others around you: Learn how to safely use, store and throw away your medicines at www.disposemymeds.org.          This list is accurate as of: 11/21/17 11:59 PM.  Always use your most recent med list.                   Brand Name Dispense Instructions for use Diagnosis    IBUPROFEN PO           lamoTRIgine 100 MG tablet    LAMICTAL    90 tablet    Take 1 tablet (100 mg) by mouth daily    Major depressive disorder, recurrent episode, in full remission (H)       simvastatin 10 MG tablet    ZOCOR    90 tablet     Take 1 tablet (10 mg) by mouth At Bedtime    Hypertriglyceridemia       TYLENOL PO

## 2017-11-22 ENCOUNTER — TELEPHONE (OUTPATIENT)
Dept: FAMILY MEDICINE | Facility: CLINIC | Age: 42
End: 2017-11-22

## 2017-11-22 LAB
ALBUMIN SERPL-MCNC: 4.6 G/DL (ref 3.4–5)
ALP SERPL-CCNC: 87 U/L (ref 40–150)
ALT SERPL W P-5'-P-CCNC: 107 U/L (ref 0–70)
ANION GAP SERPL CALCULATED.3IONS-SCNC: 6 MMOL/L (ref 3–14)
AST SERPL W P-5'-P-CCNC: 52 U/L (ref 0–45)
BILIRUB SERPL-MCNC: 0.9 MG/DL (ref 0.2–1.3)
BUN SERPL-MCNC: 19 MG/DL (ref 7–30)
CALCIUM SERPL-MCNC: 9.2 MG/DL (ref 8.5–10.1)
CHLORIDE SERPL-SCNC: 111 MMOL/L (ref 94–109)
CO2 SERPL-SCNC: 23 MMOL/L (ref 20–32)
CREAT SERPL-MCNC: 1.01 MG/DL (ref 0.66–1.25)
GFR SERPL CREATININE-BSD FRML MDRD: 80 ML/MIN/1.7M2
GLUCOSE SERPL-MCNC: 97 MG/DL (ref 70–99)
O+P STL MICRO: NORMAL
POTASSIUM SERPL-SCNC: 4.1 MMOL/L (ref 3.4–5.3)
PROT SERPL-MCNC: 8.5 G/DL (ref 6.8–8.8)
SODIUM SERPL-SCNC: 140 MMOL/L (ref 133–144)
SPECIMEN SOURCE: NORMAL

## 2017-11-22 NOTE — TELEPHONE ENCOUNTER
Reason for call:  Patient reporting a symptom    Symptom or request: Blood in stool     Duration (how long have symptoms been present): 1 day     Have you been treated for this before? Pt was seen by Maikel on 11/21    Additional comments: Pt states diarreha isn't as often but now has bleeding when using the restroom.     Phone Number patient can be reached at:  Home number on file [phone] (home)    Best Time:  Anytime     Can we leave a detailed message on this number:  YES    Call taken on 11/22/2017 at 11:54 AM by Sinai Moore

## 2017-11-24 ENCOUNTER — MYC MEDICAL ADVICE (OUTPATIENT)
Dept: FAMILY MEDICINE | Facility: CLINIC | Age: 42
End: 2017-11-24

## 2017-11-24 NOTE — LETTER
[facility]  [address]  [facility] [address] [phone]          November 24, 2017    RE:  Gage Carter                                                                                                                                                       [address]  [place] [address]            To whom it may concern:    Gage Carter is under my professional care and was at a doctors appointment on 11/21 due to illness. Please excuse him on 11/21/17.    Sincerely,        Serena Ko MD

## 2017-12-27 ENCOUNTER — TELEPHONE (OUTPATIENT)
Dept: FAMILY MEDICINE | Facility: CLINIC | Age: 42
End: 2017-12-27

## 2017-12-27 DIAGNOSIS — R74.8 ELEVATED LIVER ENZYMES: Primary | ICD-10-CM

## 2017-12-27 NOTE — LETTER
[facility]  [address]  [facility] [id number] [phone]        February 6, 2018    Gage Carter  [address] [place] [address]              Dear Gage Carter    This is to remind you that your non-fasting labs are due.    You may call our office at [phone] to schedule an appointment.    Please disregard this notice if you have already had your labs drawn or made an appointment.        Sincerely,        Serena Ko MD

## 2017-12-27 NOTE — TELEPHONE ENCOUNTER
Patient calling to schedule a liver lab test? Katerina griffin, Not sure what to schedule him for , spoke with Dr. Ko and she said to send a msg to triage  Call patient when orders are in or maybe a telephone visit? Please call him at [phone]  [facility] Coordinator

## 2017-12-27 NOTE — TELEPHONE ENCOUNTER
AS,  Patient states he needs liver US order  You advised US based on labs, but he can't get this without order  Please advise.  Sachi LEPE RN    Triage: pt wants to know when order placed at [phone], detailed VM ok

## 2017-12-27 NOTE — TELEPHONE ENCOUNTER
Called pt - mom answered, not on LILO  Advised we callback and leave VM for pt to call clinic back  Looks like pt maybe needs liver US order?  Will wait to speak with pt to confirm  Sachi LEPE RN

## 2017-12-28 ENCOUNTER — HOSPITAL ENCOUNTER (OUTPATIENT)
Dept: ULTRASOUND IMAGING | Facility: CLINIC | Age: 42
Discharge: HOME | End: 2017-12-28
Attending: FAMILY MEDICINE | Admitting: FAMILY MEDICINE
Payer: COMMERCIAL

## 2017-12-28 DIAGNOSIS — R74.8 ELEVATED LIVER ENZYMES: ICD-10-CM

## 2017-12-28 PROCEDURE — 76705 ECHO EXAM OF ABDOMEN: CPT

## 2017-12-29 NOTE — PROGRESS NOTES
Fatty liver  Rest of ultrasound abdomen normal and that's good  Intentional weight loss- should help    Serena Ko                 12/28/2017 9:56 PM

## 2018-01-04 DIAGNOSIS — E78.1 HYPERTRIGLYCERIDEMIA: ICD-10-CM

## 2018-01-08 RX ORDER — SIMVASTATIN 10 MG
TABLET ORAL
Qty: 90 TABLET | Refills: 2 | Status: SHIPPED | OUTPATIENT
Start: 2018-01-08 | End: 2018-05-03

## 2018-01-08 NOTE — TELEPHONE ENCOUNTER
"Last OV 09/29/2017.  Requested Prescriptions   Pending Prescriptions Disp Refills     simvastatin (ZOCOR) 10 MG tablet [Pharmacy Med Name: SIMVASTATIN 10 MG TABLET] 90 tablet 0     Sig: TAKE 1 TABLET (10 MG) BY MOUTH AT BEDTIME    Statins Protocol Passed    1/4/2018  9:44 AM       Passed - LDL on file in past 12 months    Recent Labs   Lab Test  09/29/17   1017   LDL  63            Passed - No abnormal creatine kinase in past 12 months    No lab results found.         Passed - Recent or future visit with authorizing provider    Patient had office visit in the last year or has a visit in the next 30 days with authorizing provider.  See \"Choose Columns\" in Meds & Orders section of the refill encounter.              Passed - Patient is age 18 or older        Prescription approved per [facility] Refill Protocol.      "

## 2018-03-16 ENCOUNTER — OFFICE VISIT (OUTPATIENT)
Dept: FAMILY MEDICINE | Facility: CLINIC | Age: 43
End: 2018-03-16
Payer: COMMERCIAL

## 2018-03-16 VITALS
DIASTOLIC BLOOD PRESSURE: 72 MMHG | TEMPERATURE: 98.9 F | HEART RATE: 74 BPM | BODY MASS INDEX: 38.09 KG/M2 | WEIGHT: 250.5 LBS | SYSTOLIC BLOOD PRESSURE: 120 MMHG | OXYGEN SATURATION: 95 %

## 2018-03-16 DIAGNOSIS — Z87.820 HISTORY OF CONCUSSION: Primary | ICD-10-CM

## 2018-03-16 DIAGNOSIS — F33.1 MODERATE EPISODE OF RECURRENT MAJOR DEPRESSIVE DISORDER (H): ICD-10-CM

## 2018-03-16 DIAGNOSIS — R74.8 ELEVATED LIVER ENZYMES: ICD-10-CM

## 2018-03-16 PROCEDURE — 99214 OFFICE O/P EST MOD 30 MIN: CPT | Performed by: FAMILY MEDICINE

## 2018-03-16 PROCEDURE — 82977 ASSAY OF GGT: CPT | Performed by: FAMILY MEDICINE

## 2018-03-16 PROCEDURE — 36415 COLL VENOUS BLD VENIPUNCTURE: CPT | Performed by: FAMILY MEDICINE

## 2018-03-16 PROCEDURE — 80076 HEPATIC FUNCTION PANEL: CPT | Performed by: FAMILY MEDICINE
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
## 2018-03-16 NOTE — PATIENT INSTRUCTIONS
1. We'll do your liver tests today.    2. Call to schedule your ultrasound - You can have your this done at either [facility] or [facility].  For [facility] call [phone].  For [place] or [facility] call [phone].    3. For your shortness of breath:   --Try to lose weight.   --If not getting better, let's do chest x-ray and pulmonary function tests.  You would have to make an appointment to come back for this.    4. For your vision:   --Call [employer] for new glasses.   --If you are able to do the therapy I think this would be helpful.    5. For your mood:   --See if there is anyone in your network who is covered who can do therapy with you.

## 2018-03-16 NOTE — NURSING NOTE
"Chief Complaint   Patient presents with     Eye Problem     referral       Initial /72  Pulse 74  Temp 98.9  F (37.2  C) (Oral)  Wt 250 lb 8 oz (113.6 kg)  SpO2 95%  BMI 38.09 kg/m2 Estimated body mass index is 38.09 kg/(m^2) as calculated from the following:    Height as of 11/21/17: 5' 8\" (1.727 m).    Weight as of this encounter: 250 lb 8 oz (113.6 kg).  Medication Reconciliation: complete   .Mer BARNETT      "

## 2018-03-16 NOTE — MR AVS SNAPSHOT
After Visit Summary   3/16/2018    Gage Carter    MRN: [id number]           Patient Information     Date Of Birth          1975        Visit Information        Provider Department      3/16/2018 10:40 AM Lesli Hernandes MD [facility]        Care Instructions    1. We'll do your liver tests today.    2. Call to schedule your ultrasound - You can have your this done at either [facility] or [facility].  For [facility] call [phone].  For [place] or [facility] call [phone].    3. For your shortness of breath:   --Try to lose weight.   --If not getting better, let's do chest x-ray and pulmonary function tests.  You would have to make an appointment to come back for this.    4. For your vision:   --Call [employer] for new glasses.   --If you are able to do the therapy I think this would be helpful.    5. For your mood:   --See if there is anyone in your network who is covered who can do therapy with you.              Follow-ups after your visit        Who to contact     If you have questions or need follow up information about today's clinic visit or your schedule please contact [facility] [place] directly at [phone].  Normal or non-critical lab and imaging results will be communicated to you by [employer], letter or phone within 4 business days after the clinic has received the results. If you do not hear from us within 7 days, please contact the clinic through [employer] or phone. If you have a critical or abnormal lab result, we will notify you by phone as soon as possible.  Submit refill requests through [facility] or call your pharmacy and they will forward the refill request to us. Please allow 3 business days for your refill to be completed.          Additional Information About Your Visit        [employer] Information     [facility] gives you secure access to your electronic health record. If you see a primary  care provider, you can also send messages to your care team and make appointments. If you have questions, please call your primary care clinic.  If you do not have a primary care provider, please call [phone] and they will assist you.        Care EveryWhere ID     This is your Care EveryWhere ID. This could be used by other organizations to access your [facility] medical records  [id number]        Your Vitals Were     Pulse Temperature Pulse Oximetry BMI (Body Mass Index)          74 98.9  F (37.2  C) (Oral) 95% 38.09 kg/m2         Blood Pressure from Last 3 Encounters:   03/16/18 120/72   11/21/17 134/78   11/09/17 132/89    Weight from Last 3 Encounters:   03/16/18 250 lb 8 oz (113.6 kg)   11/21/17 243 lb 8 oz (110.5 kg)   11/09/17 253 lb 11.2 oz (115.1 kg)              Today, you had the following     No orders found for display       Primary Care Provider Office Phone # Fax #    Leslitalon Hernandes [phone] [address] [facility] [address]        Equal Access to Services     IMELDA MAIN AH: Hadii anabela ku hadasho Sochari, waaxda luqadaha, qaybta kaalmada adefeng, nickolas johnson . So [facility] [phone].    ATENCIÓN: Si habla español, tiene a hidalgo disposición servicios gratuitos de asistencia lingüística. Llame al [phone].    We comply with applicable federal civil rights laws and [place] laws. We do not discriminate on the basis of race, color, national origin, age, disability, sex, sexual orientation, or gender identity.            Thank you!     Thank you for choosing [facility]  for your care. Our goal is always to provide you with excellent care. Hearing back from our patients is one way we can continue to improve our services. Please take a few minutes to complete the written survey that you may receive in the mail after your visit with us. Thank you!             Your Updated Medication List - Protect others  around you: Learn how to safely use, store and throw away your medicines at www.disposemymeds.org.          This list is accurate as of 3/16/18 11:12 AM.  Always use your most recent med list.                   Brand Name Dispense Instructions for use Diagnosis    IBUPROFEN PO           lamoTRIgine 100 MG tablet    LAMICTAL    90 tablet    Take 1 tablet (100 mg) by mouth daily    Major depressive disorder, recurrent episode, in full remission (H)       * simvastatin 10 MG tablet    ZOCOR    90 tablet    Take 1 tablet (10 mg) by mouth At Bedtime    Hypertriglyceridemia       * simvastatin 10 MG tablet    ZOCOR    90 tablet    TAKE 1 TABLET (10 MG) BY MOUTH AT BEDTIME    Hypertriglyceridemia       TYLENOL PO           * Notice:  This list has 2 medication(s) that are the same as other medications prescribed for you. Read the directions carefully, and ask your doctor or other care provider to review them with you.

## 2018-03-17 LAB
ALBUMIN SERPL-MCNC: 4.5 G/DL (ref 3.4–5)
ALP SERPL-CCNC: 79 U/L (ref 40–150)
ALT SERPL W P-5'-P-CCNC: 94 U/L (ref 0–70)
AST SERPL W P-5'-P-CCNC: 48 U/L (ref 0–45)
BILIRUB DIRECT SERPL-MCNC: 0.1 MG/DL (ref 0–0.2)
BILIRUB SERPL-MCNC: 0.6 MG/DL (ref 0.2–1.3)
GGT SERPL-CCNC: 24 U/L (ref 0–75)
PROT SERPL-MCNC: 7.8 G/DL (ref 6.8–8.8)

## 2018-03-21 ENCOUNTER — MYC MEDICAL ADVICE (OUTPATIENT)
Dept: FAMILY MEDICINE | Facility: CLINIC | Age: 43
End: 2018-03-21

## 2018-03-21 ENCOUNTER — TELEPHONE (OUTPATIENT)
Dept: FAMILY MEDICINE | Facility: CLINIC | Age: 43
End: 2018-03-21

## 2018-03-21 ENCOUNTER — OFFICE VISIT (OUTPATIENT)
Dept: FAMILY MEDICINE | Facility: CLINIC | Age: 43
End: 2018-03-21
Payer: COMMERCIAL

## 2018-03-21 VITALS
DIASTOLIC BLOOD PRESSURE: 80 MMHG | BODY MASS INDEX: 38.62 KG/M2 | WEIGHT: 254 LBS | HEART RATE: 71 BPM | SYSTOLIC BLOOD PRESSURE: 122 MMHG | TEMPERATURE: 98.3 F | RESPIRATION RATE: 20 BRPM | OXYGEN SATURATION: 98 %

## 2018-03-21 DIAGNOSIS — F33.1 MODERATE EPISODE OF RECURRENT MAJOR DEPRESSIVE DISORDER (H): ICD-10-CM

## 2018-03-21 DIAGNOSIS — S13.9XXA NECK SPRAIN, INITIAL ENCOUNTER: Primary | ICD-10-CM

## 2018-03-21 DIAGNOSIS — E66.01 MORBID OBESITY (H): ICD-10-CM

## 2018-03-21 DIAGNOSIS — Z87.820 HISTORY OF CONCUSSION: ICD-10-CM

## 2018-03-21 PROCEDURE — 99213 OFFICE O/P EST LOW 20 MIN: CPT | Performed by: FAMILY MEDICINE

## 2018-03-21 ASSESSMENT — PAIN SCALES - GENERAL: PAINLEVEL: MILD PAIN (2)
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
## 2018-04-03 ENCOUNTER — TELEPHONE (OUTPATIENT)
Dept: SURGERY | Facility: CLINIC | Age: 43
End: 2018-04-03

## 2018-04-03 NOTE — TELEPHONE ENCOUNTER
----- Message from STACIA Bradley CNP sent at 4/3/2018  2:47 PM CDT -----  Regarding: help please  Can you please email the optometry referral to him,?  Check the note to get his email address.  Thanks!    Ree

## 2018-04-05 NOTE — PATIENT INSTRUCTIONS
No symptoms currently.  Follow up as we discussed at last visit, or if symptoms worsen or don't improve.    WEIGHT LOSS!  Counseling.

## 2018-04-09 ENCOUNTER — TELEPHONE (OUTPATIENT)
Dept: PSYCHIATRY | Facility: CLINIC | Age: 43
End: 2018-04-09

## 2018-04-09 NOTE — TELEPHONE ENCOUNTER
Reason for Call:  Other call back    Detailed comments: Pt states that he did not discuss a plane of care with Dr. Smith at his last office visit. Pt states that he requested his psych medications from his PCP, but was informed to f/u with psychiatry.     Phone Number Patient can be reached at: Home number on file [phone] (home)    Best Time: Anytime    Can we leave a detailed message on this number? YES    Call taken on 4/9/2018 at 1:54 PM by Loni Alegria

## 2018-04-09 NOTE — TELEPHONE ENCOUNTER
Patient was referred back to his PCP after the last ov with Dr. Smith.     RN spoke with patient. He stated he wants to meet with psychiatry again to discuss going off the Lamictal.     Patient has primary care with [facility] and patient wants to stay in network to avoid high cost office visit.     RN encouraged patient to discuss option with his current  PCP and new [facility] PCP when he gets one.     Patient took the number for P.       From 11/16/2018:  Assessment:  Due to his cognitive problems I will not have him go to the usual 200 mg of Lamictal. I asked him to stay at 100 mg for several weeks but call for higher dose if irritability continues to be a problem.     Treatment Plan:  After one week, increase Lamictal (lamotrigine) from 50 mg to 100 mg per day. If still having trouble with irritability call for dose of 150 to 200 mg   Continue with vitamin D   Safety plan was reviewed; to the ER as needed or call after hours crisis line; [phone]  Education and counseling was done regarding use of medications, psychotherapy options  Follow up with Lesli Hernandes for medication management and refills.      The patient is being returned to the referring provider for ongoing care and medication prescribing.  The patient can be referred back to this service for further consultation as needed.     Signed:                     Rahul Smith MD

## 2018-04-12 ENCOUNTER — CARE COORDINATION (OUTPATIENT)
Dept: SURGERY | Facility: CLINIC | Age: 43
End: 2018-04-12

## 2018-04-12 NOTE — PROGRESS NOTES
Called patient to f/u on my chart message.Is stressed with current daily regimen.Patient is working full time and manages household and children all coco as his wife works in the evening. Headaches continues and finds that his symptoms are worse when stressed.  Has screen time much of day at work and continues to notice visual deficits. Blames most of difficulty coping with daily regimen on his concussion symptoms.Did not follow up with neuropsychology because Dr Smith resigned. Has questions about how he should wean off of his Lamictal. [facility] and a new plan of care. Patient agreeable and will continue current medication regimen.Appointment arranged with Amadou Vick PA-C.

## 2018-04-26 ENCOUNTER — TRANSFERRED RECORDS (OUTPATIENT)
Dept: HEALTH INFORMATION MANAGEMENT | Facility: CLINIC | Age: 43
End: 2018-04-26

## 2018-05-03 ENCOUNTER — OFFICE VISIT (OUTPATIENT)
Dept: SURGERY | Facility: CLINIC | Age: 43
End: 2018-05-03
Payer: COMMERCIAL

## 2018-05-03 VITALS
HEART RATE: 63 BPM | SYSTOLIC BLOOD PRESSURE: 138 MMHG | BODY MASS INDEX: 34.96 KG/M2 | WEIGHT: 236 LBS | HEIGHT: 69 IN | DIASTOLIC BLOOD PRESSURE: 93 MMHG | OXYGEN SATURATION: 96 %

## 2018-05-03 DIAGNOSIS — F07.81 POST CONCUSSIVE SYNDROME: ICD-10-CM

## 2018-05-03 DIAGNOSIS — F43.20 ADJUSTMENT DISORDER, UNSPECIFIED TYPE: Primary | ICD-10-CM

## 2018-05-03 RX ORDER — AMANTADINE HYDROCHLORIDE 100 MG/1
1 TABLET ORAL 2 TIMES DAILY
Qty: 60 TABLET | Refills: 1 | Status: SHIPPED | OUTPATIENT
Start: 2018-05-03 | End: 2019-09-25

## 2018-05-03 ASSESSMENT — ANXIETY QUESTIONNAIRES
2. NOT BEING ABLE TO STOP OR CONTROL WORRYING: NOT AT ALL
GAD7 TOTAL SCORE: 6
3. WORRYING TOO MUCH ABOUT DIFFERENT THINGS: NOT AT ALL
5. BEING SO RESTLESS THAT IT IS HARD TO SIT STILL: MORE THAN HALF THE DAYS
7. FEELING AFRAID AS IF SOMETHING AWFUL MIGHT HAPPEN: NOT AT ALL
4. TROUBLE RELAXING: NOT AT ALL
6. BECOMING EASILY ANNOYED OR IRRITABLE: NEARLY EVERY DAY
GAD7 TOTAL SCORE: 6
7. FEELING AFRAID AS IF SOMETHING AWFUL MIGHT HAPPEN: NOT AT ALL
1. FEELING NERVOUS, ANXIOUS, OR ON EDGE: SEVERAL DAYS
GAD7 TOTAL SCORE: 6

## 2018-05-03 ASSESSMENT — PATIENT HEALTH QUESTIONNAIRE - PHQ9
SUM OF ALL RESPONSES TO PHQ QUESTIONS 1-9: 5
10. IF YOU CHECKED OFF ANY PROBLEMS, HOW DIFFICULT HAVE THESE PROBLEMS MADE IT FOR YOU TO DO YOUR WORK, TAKE CARE OF THINGS AT HOME, OR GET ALONG WITH OTHER PEOPLE: VERY DIFFICULT
SUM OF ALL RESPONSES TO PHQ QUESTIONS 1-9: 5

## 2018-05-03 ASSESSMENT — PAIN SCALES - GENERAL: PAINLEVEL: MODERATE PAIN (4)
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
## 2018-05-03 NOTE — PATIENT INSTRUCTIONS
Inexpensive Prescription Eye Glasses:  www.[employer]  www.eyebuydirect.com    Go online to [facility] and get onboard with a primary care provider.  Consider EAP for best access to mental health resources.  Vision therapy: please don't go into debt for it.  [employer], disability section- voc rehab an option    Amantadine: take one in the morning, again in the afternoon. Helpful for irritability and concentration/cognitive deficits post-concussion.  Something you would want to taper if taking for a while and you decide you're done.
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
## 2018-05-03 NOTE — MR AVS SNAPSHOT
After Visit Summary   5/3/2018    Gage Carter    MRN: [id number]           Patient Information     Date Of Birth          1975        Visit Information        Provider Department      5/3/2018 9:00 AM Amadou Vick PA-C [facility]        Today's Diagnoses     Adjustment disorder, unspecified type    -  1    Post concussive syndrome          Care Instructions    Inexpensive Prescription Eye Glasses:  www.[email or web address]  www.eyebuydirect.com    Go online to [facility] and get onboard with a primary care provider.  Consider EAP for best access to mental health resources.  Vision therapy: please don't go into debt for it.  [place] [employer]ED, disability section- voc rehab an option    Amantadine: take one in the morning, again in the afternoon. Helpful for irritability and concentration/cognitive deficits post-concussion.  Something you would want to taper if taking for a while and you decide you're done.          Follow-ups after your visit        Follow-up notes from your care team     Return if symptoms worsen or fail to improve.      Who to contact     Please call your clinic at [phone] to:    Ask questions about your health    Make or cancel appointments    Discuss your medicines    Learn about your test results    Speak to your doctor            Additional Information About Your Visit        [employer] Information     [employer] gives you secure access to your electronic health record. If you see a primary care provider, you can also send messages to your care team and make appointments. If you have questions, please call your primary care clinic.  If you do not have a primary care provider, please call [phone] and they will assist you.      [employer] is an electronic gateway that provides easy, online access to your medical records. With [employer], you can request a clinic appointment, read your test results, renew a prescription or communicate with your care team.     To  "access your existing account, please contact your [facility] or call [phone] for assistance.        Care EveryWhere ID     This is your Care EveryWhere ID. This could be used by other organizations to access your [place] medical records  [id number]        Your Vitals Were     Pulse Height Pulse Oximetry BMI (Body Mass Index)          63 5' 9\" 96% 34.85 kg/m2         Blood Pressure from Last 3 Encounters:   05/03/18 (!) 138/93   03/21/18 122/80   03/16/18 120/72    Weight from Last 3 Encounters:   05/03/18 236 lb   03/21/18 254 lb   03/16/18 250 lb 8 oz              Today, you had the following     No orders found for display         Today's Medication Changes          These changes are accurate as of 5/3/18 10:08 AM.  If you have any questions, ask your nurse or doctor.               Start taking these medicines.        Dose/Directions    amantadine HCl 100 MG Tabs   Used for:  Post concussive syndrome, Adjustment disorder, unspecified type   Started by:  Amadou Vick PA-C        Dose:  1 tablet   Take 1 tablet (100 mg) by mouth 2 times daily   Quantity:  60 tablet   Refills:  1            Where to get your medicines      These medications were sent to [facility]/pharmacy #[id number] - [place], [place] - [address] [address]     Phone:  [phone]     amantadine HCl 100 MG Tabs                Primary Care Provider Office Phone # Fax #    Lesli Linda Hernandes [phone] [address] [facility] [address]        Equal Access to Services     IMELDA MAIN AH: Hadii anabela gayo Sonikkiali, waaxda luqadaha, qaybta kaalmada adeegyajoyce, nickolas sauceda. So [facility] [phone].    ATENCIÓN: Si habla español, tiene a hidalgo disposición servicios gratuitos de asistencia lingüística. Llame al [phone].    We comply with applicable federal civil rights laws and [place] laws. We do not discriminate on the basis of race, color, " national origin, age, disability, sex, sexual orientation, or gender identity.            Thank you!     Thank you for choosing [facility]  for your care. Our goal is always to provide you with excellent care. Hearing back from our patients is one way we can continue to improve our services. Please take a few minutes to complete the written survey that you may receive in the mail after your visit with us. Thank you!             Your Updated Medication List - Protect others around you: Learn how to safely use, store and throw away your medicines at www.disposemymeds.org.          This list is accurate as of 5/3/18 10:08 AM.  Always use your most recent med list.                   Brand Name Dispense Instructions for use Diagnosis    amantadine HCl 100 MG Tabs     60 tablet    Take 1 tablet (100 mg) by mouth 2 times daily    Post concussive syndrome, Adjustment disorder, unspecified type       IBUPROFEN PO           lamoTRIgine 100 MG tablet    LAMICTAL    90 tablet    Take 1 tablet (100 mg) by mouth daily    Major depressive disorder, recurrent episode, in full remission (H)       simvastatin 10 MG tablet    ZOCOR    90 tablet    Take 1 tablet (10 mg) by mouth At Bedtime    Hypertriglyceridemia       TYLENOL PO      Take 1,000 mg by mouth every 6 hours as needed
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
## 2018-05-04 ASSESSMENT — PATIENT HEALTH QUESTIONNAIRE - PHQ9: SUM OF ALL RESPONSES TO PHQ QUESTIONS 1-9: 5

## 2018-05-04 ASSESSMENT — ANXIETY QUESTIONNAIRES: GAD7 TOTAL SCORE: 6

## 2018-06-16 ENCOUNTER — TRANSFERRED RECORDS (OUTPATIENT)
Dept: HEALTH INFORMATION MANAGEMENT | Facility: CLINIC | Age: 43
End: 2018-06-16

## 2018-07-18 ENCOUNTER — TRANSFERRED RECORDS (OUTPATIENT)
Dept: HEALTH INFORMATION MANAGEMENT | Facility: CLINIC | Age: 43
End: 2018-07-18

## 2018-08-08 ENCOUNTER — NURSE TRIAGE (OUTPATIENT)
Dept: NURSING | Facility: CLINIC | Age: 43
End: 2018-08-08

## 2018-08-08 NOTE — TELEPHONE ENCOUNTER
[place] He cut his finger just below the knuckle with a knife and is certain it needs stitches. He mainly wanted to know how long is too long to wait to have it stitched.  I told Gage reasons why waiting to close a laceration isn't ideal. I told him too that he should go in even if it'll be several hours to have it evaluated eg tendon injury. He appreciated the information.  Trish OTOOLE RN [facility]

## 2019-04-24 ENCOUNTER — OFFICE VISIT (OUTPATIENT)
Dept: URGENT CARE | Facility: URGENT CARE | Age: 44
End: 2019-04-24
Payer: COMMERCIAL

## 2019-04-24 VITALS
HEART RATE: 77 BPM | TEMPERATURE: 98.8 F | DIASTOLIC BLOOD PRESSURE: 81 MMHG | SYSTOLIC BLOOD PRESSURE: 130 MMHG | OXYGEN SATURATION: 98 %

## 2019-04-24 DIAGNOSIS — M54.12 CERVICAL RADICULOPATHY: Primary | ICD-10-CM

## 2019-04-24 PROCEDURE — 99213 OFFICE O/P EST LOW 20 MIN: CPT | Performed by: PHYSICIAN ASSISTANT

## 2019-04-24 RX ORDER — METHYLPREDNISOLONE 4 MG
TABLET, DOSE PACK ORAL
Qty: 21 TABLET | Refills: 0 | Status: SHIPPED | OUTPATIENT
Start: 2019-04-24 | End: 2019-09-25

## 2019-04-24 RX ORDER — DICLOFENAC SODIUM 75 MG/1
75 TABLET, DELAYED RELEASE ORAL 2 TIMES DAILY
Qty: 30 TABLET | Refills: 0 | Status: SHIPPED | OUTPATIENT
Start: 2019-04-24 | End: 2019-09-25
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
## 2019-04-25 NOTE — PATIENT INSTRUCTIONS
Patient Education     Pinched Nerve in the Neck  A pinched nerve in the neck (cervical radiculopathy) is caused when the nerve that goes from the spinal cord to the neck or arm is irritated or has pressure on it. This may be caused by a bulging spinal disk. A spinal disk is the cushion between each spinal bone. Or it may be caused by a narrowing of the spinal joint because of osteoarthritis and wear and tear from repeated injuries.  A pinched nerve can cause numbness, tingling, deep aching, or electrical shooting pain from the side of the neck all the way down to the fingers on one side.  A pinched nerve may start after a sudden turning or bending force (such as in a car accident) or after a simple awkward movement. In either case, muscle spasm is commonly present and adds to the pain.  Home care  Follow these guidelines when caring for yourself at home:    Rest and relax the muscles. Use a comfortable pillow that supports your head and keeps your spine in a natural (neutral) position. Your head shouldn t be tilted forward or backward. A rolled-up towel may help for a custom fit. When standing or sitting, keep your neck in line with your body. Keep your head up and shoulders down. Stay away from activities that require you to move your neck a lot.    You can use heat and massage to help ease the pain. Take a hot shower or bath, or use a heating pad. You can also use a cold pack for relief. You can make a cold pack by wrapping a plastic bag of crushed or cubed ice in a thin towel. Try both heat and cold, and use the method that feels best. Do this for 20 minutes several times a day.    You may use acetaminophen or ibuprofen to control pain, unless another pain medicine was prescribed. If you have chronic liver or kidney disease, talk with your healthcare provider before using these medicines. Also talk with your provider if you ve had a stomach ulcer or gastrointestinal bleeding.    Reduce stress. Stress can make  it longer for your pain to go away.    Do any exercises or stretches that were given to you as part of your discharge plan.    Wear a soft collar, if prescribed.    Physical therapy and massages are known to help.    You may need surgery for a more serious injury.  Follow-up care  Follow up with your healthcare provider, or as advised, if you don t start to get better after 1 week. You may need more tests. Tell your provider about any fever, chills, or weight loss.  If X-rays were taken, a radiologist may look at them. You will be told of any new findings that may affect your care.  When to seek medical advice  Call your healthcare provider right away if any of these occur:    Pain becomes worse even after taking prescribed pain medicine    Weakness in the arm or legs    Numbness in the arm gets worse    Trouble breathing or swallowing  Date Last Reviewed: 5/1/2017 2000-2018 [employer]. [address] [place] [address], [place] [address]. All rights reserved. This information is not intended as a substitute for professional medical care. Always follow your healthcare professional's instructions.

## 2019-09-25 ENCOUNTER — OFFICE VISIT (OUTPATIENT)
Dept: FAMILY MEDICINE | Facility: CLINIC | Age: 44
End: 2019-09-25
Payer: COMMERCIAL

## 2019-09-25 VITALS
SYSTOLIC BLOOD PRESSURE: 136 MMHG | RESPIRATION RATE: 18 BRPM | HEIGHT: 69 IN | HEART RATE: 55 BPM | TEMPERATURE: 97.6 F | OXYGEN SATURATION: 95 % | WEIGHT: 260 LBS | BODY MASS INDEX: 38.51 KG/M2 | DIASTOLIC BLOOD PRESSURE: 84 MMHG

## 2019-09-25 DIAGNOSIS — Z87.820 HX OF CONCUSSION: ICD-10-CM

## 2019-09-25 DIAGNOSIS — G44.219 EPISODIC TENSION-TYPE HEADACHE, NOT INTRACTABLE: Primary | ICD-10-CM

## 2019-09-25 PROCEDURE — 99214 OFFICE O/P EST MOD 30 MIN: CPT | Performed by: PHYSICIAN ASSISTANT

## 2019-09-25 ASSESSMENT — MIFFLIN-ST. JEOR: SCORE: 2041.79
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
## 2019-09-29 ENCOUNTER — HEALTH MAINTENANCE LETTER (OUTPATIENT)
Age: 44
End: 2019-09-29

## 2019-12-25 ENCOUNTER — TELEPHONE (OUTPATIENT)
Dept: FAMILY MEDICINE | Facility: CLINIC | Age: 44
End: 2019-12-25

## 2019-12-25 ENCOUNTER — NURSE TRIAGE (OUTPATIENT)
Dept: NURSING | Facility: CLINIC | Age: 44
End: 2019-12-25

## 2019-12-25 DIAGNOSIS — Z20.828 EXPOSURE TO INFLUENZA: Primary | ICD-10-CM

## 2019-12-25 RX ORDER — OSELTAMIVIR PHOSPHATE 75 MG/1
75 CAPSULE ORAL DAILY
Qty: 10 CAPSULE | Refills: 0 | Status: SHIPPED | OUTPATIENT
Start: 2019-12-25 | End: 2020-01-04

## 2019-12-25 NOTE — TELEPHONE ENCOUNTER
Clinic Action Needed:  Please contact patient at [phone]; okay to leave detailed message at that number.  Reason for Call:  Patient states two of his children have influenza B.  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  Denies any symptoms of the flu at time of call.  Patient states he does have an autoimmune disease although never officially diagnosed.  Preferred pharmacy is [facility] on [facility] in [place].   Routed to:  PCP Pool    Please close encounter when completed.

## 2019-12-25 NOTE — TELEPHONE ENCOUNTER
Patient states two of his children have influenza B.  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  Denies any symptoms of the flu at time of call.  Patient states he does have an autoimmune disease although never officially diagnosed.  Preferred pharmacy is [facility] on [facility] in [place].  Routed to PCP Pool.

## 2020-01-01 NOTE — TELEPHONE ENCOUNTER
AS,  Please see below and advise.  Pended and routed letter to you.  Please authorize if appropriate.  Thanks,  Karie Manzo RN      
Letter at [facility] triage desk.  Sent patient Mychart asking him if he wants to  or mailed.  Penny Rodriguez RN    
9

## 2020-01-16 ENCOUNTER — TRANSFERRED RECORDS (OUTPATIENT)
Dept: HEALTH INFORMATION MANAGEMENT | Facility: CLINIC | Age: 45
End: 2020-01-16

## 2020-03-04 NOTE — PROGRESS NOTES
Routine stool test for parasite, common pathogen responsible for infectious diarrhea have been negative and that's good    Your blood test is abnormal for liver enzymes and I noted that it was similar abnormal 2 years ago    That should further be rechecked and looked into  High liver enzymes could be from  Fatty liver & I do recommend to get your liver ultrasound completed    Call to schedule your imaging:[place] or [facility], [facility] [phone]  -Kidney function is normal (Cr, GFR), Sodium is normal, Potassium is normal, Calcium is normal, Glucose is normal (diabetes screening test).     Please keep us posted with questions or concerns .      Best Regards,    Serena Ko MD  [facility] [place]  [phone]       98.1

## 2020-04-27 ENCOUNTER — TELEPHONE (OUTPATIENT)
Dept: FAMILY MEDICINE | Facility: CLINIC | Age: 45
End: 2020-04-27

## 2020-04-27 NOTE — TELEPHONE ENCOUNTER
Patient's spouse is reporting that she's concerned about patients SOB. Call transferred to emergent line.    Sharon PINK     [facility]        Problem: Mobility Impaired (Adult and Pediatric)  Goal: *Acute Goals and Plan of Care (Insert Text)  Description  FUNCTIONAL STATUS PRIOR TO ADMISSION: Patient was modified independent using a single point cane for functional mobility. HOME SUPPORT PRIOR TO ADMISSION: The patient lived with family. Physical Therapy Goals  Initiated 10/31/2019  1. Patient will move from supine to sit and sit to supine , scoot up and down and roll side to side in bed with supervision/set-up within 7 day(s). 2.  Patient will transfer from bed to chair and chair to bed with supervision/set-up using the least restrictive device within 7 day(s). 3.  Patient will perform sit to stand with supervision/set-up within 7 day(s). 4.  Patient will ambulate with supervision/set-up for 300 feet with the least restrictive device within 7 day(s). 5.  Patient will ascend/descend 4 stairs with 1 handrail(s) with supervision/set-up within 7 day(s). Outcome: Progressing Towards Goal     PHYSICAL THERAPY EVALUATION  Patient: Kevyn Ayers (46 y.o. male)  Date: 10/31/2019  Primary Diagnosis: Lumbar spinal stenosis [M48.061]  Procedure(s) (LRB):  REVISION LUMBAR LAMINECTOMY L3-4 WITH DISCECTOMY, REVISION POSTERIOR SPINAL FUSION L3-S1 (N/A) 1 Day Post-Op   Precautions:   Back, Other (comment)(TLSO)      ASSESSMENT  Based on the objective data described below, the patient presents with impaired trunk ROM, spinal precautions, B LE and back pain, balance, and weakness and gait dysfunction/intolerance causing limited independence with mobility s/p recent revision lami L3-4 with discectomy and revision fusion L3-S1 POD #1. Pt received in supine, fitted for brace this AM, ambulates with RW to hallway and back with slow reciprocal gait. Pt tolerates fair with c/o severe 10/10 pain, appears stable. Reviewed back precautions, sitting limit of 30-45 minutes, positioning in chair, and progress.  Pt is not cleared for discharge from Physical Therapy standpoint at this time, recommend HHPT. Will benefit from therapy in acute setting, anticipate will improve tolerance quickly with improved pain control. Current Level of Function Impacting Discharge (mobility/balance): min A    Functional Outcome Measure: The patient scored  /100 on the Barthel Index which is indicative of high impaired ability to care for basic self needs/dependency on others. Patient will benefit from skilled therapy intervention to address the above noted impairments. PLAN :  Recommendations and Planned Interventions: bed mobility training, transfer training, gait training, therapeutic exercises, neuromuscular re-education, patient and family training/education and therapeutic activities      Frequency/Duration: Patient will be followed by physical therapy:  twice daily to address goals. Recommendation for discharge: (in order for the patient to meet his/her long term goals)  Physical therapy at least 2 days/week in the home     This discharge recommendation:  Has been made in collaboration with the attending provider and/or case management    IF patient discharges home will need the following DME: brace/splint and rolling walker         SUBJECTIVE:   Patient stated I just got up, and I have to get up again? Saloni Lopez    OBJECTIVE DATA SUMMARY:   HISTORY:    Past Medical History:   Diagnosis Date    Arthritis     2010    Asthma 1995    INHALER USE PRN    Chronic bilateral low back pain without sciatica 11/6/2017    Chronic hepatitis C without hepatic coma ([facility] [address] 11/6/2017    [facility] at [facility] Chronic pain     lower back    Essential hypertension 11/6/2017    GERD (gastroesophageal reflux disease)     History of cardiac cath     Hypercholesterolemia     Mild episode of recurrent major depressive disorder ([address] 11/06/2012    Morbid obesity ([address]     PTSD (post-traumatic stress disorder)     Sleep apnea     pt stated was taken off cpap while on blood thinner    Stage 3 chronic kidney disease ([facility] [address] 11/06/2017    IMPROVED     Thromboembolus ([facility] [address]     1/3/19  DVT,PE while taking testosterone    Uncontrolled type 2 diabetes mellitus with peripheral neuropathy ([facility] [address] 11/06/2011     Past Surgical History:   Procedure Laterality Date    HX BACK SURGERY  04/10/2018    Fusion L4,L5    HX COLONOSCOPY  2013    CJW    HX HEART CATHETERIZATION  2017    NEG PER PATIENT    HX HERNIA REPAIR  3736    UMBILICAL    HX KNEE ARTHROSCOPY  1999    right knee    HX KNEE ARTHROSCOPY  2002    left knee    HX ROTATOR CUFF REPAIR Right 2016    HX UROLOGICAL  2015    Vasectomy        Personal factors and/or comorbidities impacting plan of care:     Home Situation  Home Environment: Private residence  # Steps to Enter: 8  Rails to Enter: Yes  Hand Rails : Bilateral  One/Two Story Residence: Two story  # of Interior Steps: 12  Interior Rails: Both  Living Alone: No  Support Systems: Spouse/Significant Other/Partner  Patient Expects to be Discharged to[de-identified] Private residence  Current DME Used/Available at Home: Wade Dain, straight, Grab bars(grab bar in shower, low toilet seats )  Tub or Shower Type: Tub/Shower combination    EXAMINATION/PRESENTATION/DECISION MAKING:   Critical Behavior:  Neurologic State: Alert  Orientation Level: Oriented X4  Cognition: Appropriate decision making, Appropriate safety awareness, Appropriate for age attention/concentration, Follows commands  Safety/Judgement: Awareness of environment, Good awareness of safety precautions  Hearing:   Auditory  Auditory Impairment: None  Skin:  intact  Edema: none  Range Of Motion:  AROM: Generally decreased, functional           PROM: Generally decreased, functional           Strength:    Strength: Generally decreased, functional                    Tone & Sensation:   Tone: Normal              Sensation: Intact               Coordination:  Coordination: Generally decreased, functional  Vision:      Functional Mobility:  Bed Mobility:  Rolling: Minimum assistance  Supine to Sit: Minimum assistance  Sit to Supine: Minimum assistance  Scooting: Setup  Transfers:  Sit to Stand: Minimum assistance  Stand to Sit: Minimum assistance                       Balance:   Sitting: High guard; Intact  Standing: Impaired; With support  Standing - Static: Fair;Constant support  Standing - Dynamic : Fair;Constant support  Ambulation/Gait Training:  Distance (ft): 40 Feet (ft)  Assistive Device: Walker, rolling  Ambulation - Level of Assistance: Minimal assistance;Contact guard assistance     Gait Description (WDL): Exceptions to WDL  Gait Abnormalities: Shuffling gait        Base of Support: Widened     Speed/Eliza: Shuffled; Slow  Step Length: Left shortened;Right shortened        Barthel Index:                                   /100       The Barthel ADL Index: Guidelines  1. The index should be used as a record of what a patient does, not as a record of what a patient could do. 2. The main aim is to establish degree of independence from any help, physical or verbal, however minor and for whatever reason. 3. The need for supervision renders the patient not independent. 4. A patient's performance should be established using the best available evidence. Asking the patient, friends/relatives and nurses are the usual sources, but direct observation and common sense are also important. However direct testing is not needed. 5. Usually the patient's performance over the preceding 24-48 hours is important, but occasionally longer periods will be relevant. 6. Middle categories imply that the patient supplies over 50 per cent of the effort. 7. Use of aids to be independent is allowed. [place] Barthel, DWilliamW. (8615). Functional evaluation: the Barthel Index. [address] [facility] (14)2. FAHAD Granados, Eyad Mcintosh., Cori Umaña., Hieu, [address] (1999).  Measuring the change indisability after inpatient rehabilitation; comparison of the responsiveness of the Barthel Index and Functional [place] Measure. Journal of Neurology, Neurosurgery, and Psychiatry, 66(4), 689-571. ASHLEY Rios, CROW Euceda, & Mercedez Lee M.A. (2004.) Assessment of post-stroke quality of life in cost-effectiveness studies: The usefulness of the Barthel Index and the EuroQoL-5D. Quality of Life Research, 15, 172-15        Physical Therapy Evaluation Charge Determination   History Examination Presentation Decision-Making   HIGH Complexity :3+ comorbidities / personal factors will impact the outcome/ POC  HIGH Complexity : 4+ Standardized tests and measures addressing body structure, function, activity limitation and / or participation in recreation  LOW Complexity : Stable, uncomplicated  Other outcome measures barthel  HIGH       Based on the above components, the patient evaluation is determined to be of the following complexity level: LOW     Pain Rating:  10/10, Pt pain appears stable with use of FLACC objective measurement pain scale: Face 1, Legs 0, Activity 0, Cry 2, Consolability 2 for a score of 5/10    Face  0 No particular expression or smile 1 Occasional grimace or frown, withdrawn, uninterested 2 Frequent to constant quivering chin, clenched jaw  Legs  0 Normal position or relaxed  1 Uneasy, restless, tense    2 Kicking, or legs drawn up  Activity  0 Lying quietly/normally, moves easily 1 Squirming, shifting, back and forth, tense  2 Arched, rigid or jerking  Cry  0 No cry (awake or asleep)  1 Moans or whimpers; occasional complaint   2 Crying steadily, screams or sobs, frequent complaints  Consolability  0 Content, relaxed   1 Reassured by touching, being talked to, distractible 2 Difficult to console or comfort      Activity Tolerance:   Fair  Please refer to the flowsheet for vital signs taken during this treatment.     After treatment patient left in no apparent distress:   Supine in bed and Call bell within reach    COMMUNICATION/EDUCATION:   The patients plan of care was discussed with: Registered Nurse and . Fall prevention education was provided and the patient/caregiver indicated understanding. and Patient/family have participated as able in goal setting and plan of care.     Thank you for this referral.  Bernadette Hernandez, PT   Time Calculation: 25 mins

## 2020-04-27 NOTE — TELEPHONE ENCOUNTER
Wife called clinic.  Pt did a virtual appt [facility].  They directed pt to [facility] to do a covid test.  They have a family member at high risk with asthma.    PT has SOB, labored breathing,pink eye, and fatigue.    [facility] referred [facility] to [facility]. Then they were directed to [facility]. [place] will only teste for [facility].    I directed pt to be evaluated in UC based on sx.  I did discuss the limitations and availability of COVID testing. [facility] are currently NOT testing.  Wife will have pt get seen at [facility], if worse sx, go to [facility].  ZAID Neri

## 2021-01-14 ENCOUNTER — HEALTH MAINTENANCE LETTER (OUTPATIENT)
Age: 46
End: 2021-01-14

## 2021-10-04 ENCOUNTER — TRANSFERRED RECORDS (OUTPATIENT)
Dept: HEALTH INFORMATION MANAGEMENT | Facility: CLINIC | Age: 46
End: 2021-10-04

## 2021-10-20 ENCOUNTER — HOSPITAL ENCOUNTER (EMERGENCY)
Facility: CLINIC | Age: 46
Discharge: HOME | End: 2021-10-20
Attending: NURSE PRACTITIONER | Admitting: NURSE PRACTITIONER
Payer: OTHER MISCELLANEOUS

## 2021-10-20 VITALS
HEART RATE: 78 BPM | HEIGHT: 68 IN | RESPIRATION RATE: 17 BRPM | DIASTOLIC BLOOD PRESSURE: 94 MMHG | TEMPERATURE: 99 F | OXYGEN SATURATION: 96 % | BODY MASS INDEX: 40.92 KG/M2 | WEIGHT: 270 LBS | SYSTOLIC BLOOD PRESSURE: 159 MMHG

## 2021-10-20 DIAGNOSIS — M25.562 ACUTE PAIN OF LEFT KNEE: ICD-10-CM

## 2021-10-20 PROCEDURE — 29505 APPLICATION LONG LEG SPLINT: CPT | Mod: LT

## 2021-10-20 PROCEDURE — 99284 EMERGENCY DEPT VISIT MOD MDM: CPT | Mod: 25

## 2021-10-20 ASSESSMENT — ENCOUNTER SYMPTOMS
COUGH: 0
JOINT SWELLING: 0
HEADACHES: 0

## 2021-10-20 ASSESSMENT — MIFFLIN-ST. JEOR: SCORE: 2069.21

## 2021-10-20 NOTE — ED TRIAGE NOTES
Injured left knee at work on 10/4/21. Was seen at [facility] urgent care and xray was negative for fracture. Pt reports significant pain and increasing pain, unable to sleep. Pt has consult with [facility] again tomorrow but pain was too great to wait. He did not take anything for pain today. Pt unable to bear weight to LLE. Offered pt OTC pain medication in triage to which he declined.

## 2021-10-21 ENCOUNTER — PATIENT OUTREACH (OUTPATIENT)
Dept: FAMILY MEDICINE | Facility: CLINIC | Age: 46
End: 2021-10-21

## 2021-10-21 NOTE — TELEPHONE ENCOUNTER
Patient established care with a [facility].    No outreach attempt will be made by Triage JARROD PappasN, RN  [facility]

## 2021-10-23 ENCOUNTER — HEALTH MAINTENANCE LETTER (OUTPATIENT)
Age: 46
End: 2021-10-23

## 2022-02-12 ENCOUNTER — HEALTH MAINTENANCE LETTER (OUTPATIENT)
Age: 47
End: 2022-02-12

## 2022-09-19 ENCOUNTER — APPOINTMENT (OUTPATIENT)
Dept: CT IMAGING | Facility: CLINIC | Age: 47
End: 2022-09-19
Attending: EMERGENCY MEDICINE
Payer: COMMERCIAL

## 2022-09-19 ENCOUNTER — HOSPITAL ENCOUNTER (EMERGENCY)
Facility: CLINIC | Age: 47
Discharge: HOME | End: 2022-09-20
Attending: EMERGENCY MEDICINE | Admitting: EMERGENCY MEDICINE
Payer: COMMERCIAL

## 2022-09-19 DIAGNOSIS — N20.1 URETEROLITHIASIS: ICD-10-CM

## 2022-09-19 LAB
ALBUMIN UR-MCNC: 50 MG/DL
ANION GAP SERPL CALCULATED.3IONS-SCNC: 7 MMOL/L (ref 3–14)
APPEARANCE UR: CLEAR
BASOPHILS # BLD AUTO: 0 10E3/UL (ref 0–0.2)
BASOPHILS NFR BLD AUTO: 0 %
BILIRUB UR QL STRIP: NEGATIVE
BUN SERPL-MCNC: 18 MG/DL (ref 7–30)
CALCIUM SERPL-MCNC: 8.9 MG/DL (ref 8.5–10.1)
CHLORIDE BLD-SCNC: 106 MMOL/L (ref 94–109)
CO2 SERPL-SCNC: 26 MMOL/L (ref 20–32)
COLOR UR AUTO: YELLOW
CREAT SERPL-MCNC: 1.41 MG/DL (ref 0.66–1.25)
EOSINOPHIL # BLD AUTO: 0.2 10E3/UL (ref 0–0.7)
EOSINOPHIL NFR BLD AUTO: 2 %
ERYTHROCYTE [DISTWIDTH] IN BLOOD BY AUTOMATED COUNT: 12.4 % (ref 10–15)
GFR SERPL CREATININE-BSD FRML MDRD: 61 ML/MIN/1.73M2
GLUCOSE BLD-MCNC: 139 MG/DL (ref 70–99)
GLUCOSE UR STRIP-MCNC: NEGATIVE MG/DL
HCT VFR BLD AUTO: 42.4 % (ref 40–53)
HGB BLD-MCNC: 14.8 G/DL (ref 13.3–17.7)
HGB UR QL STRIP: NEGATIVE
IMM GRANULOCYTES # BLD: 0 10E3/UL
IMM GRANULOCYTES NFR BLD: 0 %
KETONES UR STRIP-MCNC: NEGATIVE MG/DL
LEUKOCYTE ESTERASE UR QL STRIP: NEGATIVE
LYMPHOCYTES # BLD AUTO: 2.8 10E3/UL (ref 0.8–5.3)
LYMPHOCYTES NFR BLD AUTO: 26 %
MCH RBC QN AUTO: 30.9 PG (ref 26.5–33)
MCHC RBC AUTO-ENTMCNC: 34.9 G/DL (ref 31.5–36.5)
MCV RBC AUTO: 89 FL (ref 78–100)
MONOCYTES # BLD AUTO: 1 10E3/UL (ref 0–1.3)
MONOCYTES NFR BLD AUTO: 10 %
MUCOUS THREADS #/AREA URNS LPF: PRESENT /LPF
NEUTROPHILS # BLD AUTO: 6.6 10E3/UL (ref 1.6–8.3)
NEUTROPHILS NFR BLD AUTO: 62 %
NITRATE UR QL: NEGATIVE
NRBC # BLD AUTO: 0 10E3/UL
NRBC BLD AUTO-RTO: 0 /100
PH UR STRIP: 6.5 [PH] (ref 5–7)
PLATELET # BLD AUTO: 252 10E3/UL (ref 150–450)
POTASSIUM BLD-SCNC: 3.7 MMOL/L (ref 3.4–5.3)
RBC # BLD AUTO: 4.79 10E6/UL (ref 4.4–5.9)
RBC URINE: 1 /HPF
SODIUM SERPL-SCNC: 139 MMOL/L (ref 133–144)
SP GR UR STRIP: 1.03 (ref 1–1.03)
UROBILINOGEN UR STRIP-MCNC: 4 MG/DL
WBC # BLD AUTO: 10.7 10E3/UL (ref 4–11)
WBC URINE: 1 /HPF

## 2022-09-19 PROCEDURE — 36415 COLL VENOUS BLD VENIPUNCTURE: CPT | Performed by: EMERGENCY MEDICINE

## 2022-09-19 PROCEDURE — 96375 TX/PRO/DX INJ NEW DRUG ADDON: CPT

## 2022-09-19 PROCEDURE — 96374 THER/PROPH/DIAG INJ IV PUSH: CPT

## 2022-09-19 PROCEDURE — 85014 HEMATOCRIT: CPT | Performed by: EMERGENCY MEDICINE

## 2022-09-19 PROCEDURE — 81001 URINALYSIS AUTO W/SCOPE: CPT | Performed by: EMERGENCY MEDICINE

## 2022-09-19 PROCEDURE — 82310 ASSAY OF CALCIUM: CPT | Performed by: EMERGENCY MEDICINE

## 2022-09-19 PROCEDURE — 99285 EMERGENCY DEPT VISIT HI MDM: CPT | Mod: 25

## 2022-09-19 PROCEDURE — 258N000003 HC RX IP 258 OP 636: Performed by: EMERGENCY MEDICINE

## 2022-09-19 PROCEDURE — 250N000011 HC RX IP 250 OP 636: Performed by: EMERGENCY MEDICINE

## 2022-09-19 PROCEDURE — 96361 HYDRATE IV INFUSION ADD-ON: CPT

## 2022-09-19 PROCEDURE — 74176 CT ABD & PELVIS W/O CONTRAST: CPT

## 2022-09-19 RX ORDER — ONDANSETRON 2 MG/ML
4 INJECTION INTRAMUSCULAR; INTRAVENOUS EVERY 30 MIN PRN
Status: DISCONTINUED | OUTPATIENT
Start: 2022-09-19 | End: 2022-09-20 | Stop reason: HOSPADM

## 2022-09-19 RX ORDER — SODIUM CHLORIDE 9 MG/ML
INJECTION, SOLUTION INTRAVENOUS CONTINUOUS
Status: DISCONTINUED | OUTPATIENT
Start: 2022-09-20 | End: 2022-09-20 | Stop reason: HOSPADM

## 2022-09-19 RX ORDER — KETOROLAC TROMETHAMINE 15 MG/ML
15 INJECTION, SOLUTION INTRAMUSCULAR; INTRAVENOUS ONCE
Status: COMPLETED | OUTPATIENT
Start: 2022-09-19 | End: 2022-09-19

## 2022-09-19 RX ADMIN — SODIUM CHLORIDE 1000 ML: 9 INJECTION, SOLUTION INTRAVENOUS at 23:22

## 2022-09-19 RX ADMIN — ONDANSETRON 4 MG: 2 INJECTION INTRAMUSCULAR; INTRAVENOUS at 23:26

## 2022-09-19 RX ADMIN — KETOROLAC TROMETHAMINE 15 MG: 15 INJECTION, SOLUTION INTRAMUSCULAR; INTRAVENOUS at 23:21

## 2022-09-19 ASSESSMENT — ACTIVITIES OF DAILY LIVING (ADL): ADLS_ACUITY_SCORE: 33

## 2022-09-19 NOTE — LETTER
September 20, 2022      To Whom It May Concern:      Gage Carter was seen in our Emergency Department today, 09/20/22. Please excuse him from work today and tomorrow due to medical concern (kidney stone). He may return sooner if symptoms allow.     Sincerely,        Aldo Cook MD

## 2022-09-20 VITALS
TEMPERATURE: 98.2 F | BODY MASS INDEX: 38.34 KG/M2 | SYSTOLIC BLOOD PRESSURE: 139 MMHG | HEIGHT: 68 IN | WEIGHT: 253 LBS | HEART RATE: 77 BPM | RESPIRATION RATE: 24 BRPM | OXYGEN SATURATION: 97 % | DIASTOLIC BLOOD PRESSURE: 80 MMHG

## 2022-09-20 RX ORDER — ONDANSETRON 4 MG/1
4 TABLET, ORALLY DISINTEGRATING ORAL EVERY 8 HOURS PRN
Qty: 10 TABLET | Refills: 0 | Status: SHIPPED | OUTPATIENT
Start: 2022-09-20 | End: 2023-02-07

## 2022-09-20 RX ORDER — KETOROLAC TROMETHAMINE 10 MG/1
10 TABLET, FILM COATED ORAL EVERY 6 HOURS PRN
Qty: 20 TABLET | Refills: 0 | Status: SHIPPED | OUTPATIENT
Start: 2022-09-20 | End: 2023-02-07

## 2022-09-20 RX ORDER — TAMSULOSIN HYDROCHLORIDE 0.4 MG/1
0.4 CAPSULE ORAL DAILY
Qty: 10 CAPSULE | Refills: 0 | Status: SHIPPED | OUTPATIENT
Start: 2022-09-20 | End: 2022-09-30

## 2022-09-20 RX ORDER — OXYCODONE HYDROCHLORIDE 5 MG/1
5 TABLET ORAL EVERY 4 HOURS PRN
Qty: 12 TABLET | Refills: 0 | Status: SHIPPED | OUTPATIENT
Start: 2022-09-20 | End: 2023-02-07

## 2022-09-20 ASSESSMENT — ENCOUNTER SYMPTOMS
FLANK PAIN: 1
DYSURIA: 1
HEMATURIA: 0
VOMITING: 1
ABDOMINAL PAIN: 0
NAUSEA: 1

## 2022-09-20 NOTE — ED TRIAGE NOTES
Patient here with left flank pain since Saturday. He also has nausea and vomiting. He is not voiding much and has burning

## 2022-09-20 NOTE — ED PROVIDER NOTES
"  History   Chief Complaint:  Flank Pain       The history is provided by the patient.      Gage Carter is a 47 year old male with history of type 2 diabetes and anxiety who presents with flank pain. The patient reports that 3 days ago he developed pain in his left flank alongside intermittent nausea and vomiting that became increasingly more constant. He endorses dysuria. He denies abdominal pain, chest pain, hematuria and blood in vomit. The patient states that the only relief of symptoms is right after vomiting.    Review of Systems   Cardiovascular: Negative for chest pain.   Gastrointestinal: Positive for nausea and vomiting. Negative for abdominal pain.   Genitourinary: Positive for dysuria and flank pain (right). Negative for hematuria.   A 10 point ROS was obtained and negative except as noted here and in HPI    Allergies:  Keflex  Cephalexin    Medications:  Zocor  Zoloft  Ativan  Oxycodone  Zofran  Glucophage    Past Medical History:     Depression  Hyperlipidemia  Anxiety  Immune deficiency disorder  Paranoid state  Concussion     Past Surgical History:    Arthroscopy shoulder debridement  Tonsil surgery  Colonoscopy  Hernia repair  Lasik    Family History:    Father: Hypertension, lipids  Sister: Depression, lipids    Social History:  PCP: Mejia Chan     Physical Exam     Patient Vitals for the past 24 hrs:   BP Temp Temp src Pulse Resp SpO2 Height Weight   09/19/22 2350 139/80 -- -- -- -- 97 % -- --   09/19/22 2228 (!) 159/98 98.2  F (36.8  C) Oral 77 24 96 % 1.727 m (5' 8\") 114.8 kg (253 lb)       Physical Exam  VS: Reviewed per above  HENT: normal speech  EYES: sclera anicteric  CV: Rate as noted, regular rhythm.   RESP: Effort normal. Breath sounds are normal bilaterally.  GI: no tenderness/rebound/guarding, not distended.  NEURO: Alert, moving all extremities  MSK: No deformity of the extremities  SKIN: Warm and dry      Emergency Department Course   ECG  No results found for " this or any previous visit.    Imaging:  Abd/pelvis CT no contrast - Stone Protocol   Final Result   IMPRESSION:    1.  2.5 mm stone distal left ureter with moderate left-sided hydronephrosis      2.  Fatty liver.      3.  Normal appendix.           Report per radiology    Laboratory:  Labs Ordered and Resulted from Time of ED Arrival to Time of ED Departure   BASIC METABOLIC PANEL - Abnormal       Result Value    Sodium 139      Potassium 3.7      Chloride 106      Carbon Dioxide (CO2) 26      Anion Gap 7      Urea Nitrogen 18      Creatinine 1.41 (*)     Calcium 8.9      Glucose 139 (*)     GFR Estimate 61     ROUTINE UA WITH MICROSCOPIC REFLEX TO CULTURE - Abnormal    Color Urine Yellow      Appearance Urine Clear      Glucose Urine Negative      Bilirubin Urine Negative      Ketones Urine Negative      Specific Gravity Urine 1.034      Blood Urine Negative      pH Urine 6.5      Protein Albumin Urine 50  (*)     Urobilinogen Urine 4.0 (*)     Nitrite Urine Negative      Leukocyte Esterase Urine Negative      Mucus Urine Present (*)     RBC Urine 1      WBC Urine 1     CBC WITH PLATELETS AND DIFFERENTIAL    WBC Count 10.7      RBC Count 4.79      Hemoglobin 14.8      Hematocrit 42.4      MCV 89      MCH 30.9      MCHC 34.9      RDW 12.4      Platelet Count 252      % Neutrophils 62      % Lymphocytes 26      % Monocytes 10      % Eosinophils 2      % Basophils 0      % Immature Granulocytes 0      NRBCs per 100 WBC 0      Absolute Neutrophils 6.6      Absolute Lymphocytes 2.8      Absolute Monocytes 1.0      Absolute Eosinophils 0.2      Absolute Basophils 0.0      Absolute Immature Granulocytes 0.0      Absolute NRBCs 0.0            Emergency Department Course:     Reviewed:  I reviewed nursing notes, vitals, past medical history and Care Everywhere    Assessments:  2313 I obtained history and examined the patient as noted above.   0011 I rechecked the patient and explained findings.      Interventions:  2321 Toradol 15 mg IV  2322 NS 1 L IV  2326 Zofran 4 mg IV    Disposition:  The patient was discharged to home.     Impression & Plan     Medical Decision Making:  Gage Carter is a 47 year old male who presented with unilateral flank & abdominal pain consistent with renal colic. CT confirms a 2.5mm ureteral stone at the distal left ureter.  Renal function is normal/baseline.  CT and lab workup show no other alternative etiology that could be causing his symptoms (e.g., AAA, appendicitis, pyelonephritis). There is no fever or convincing evidence of a urinary tract infection. On recheck, his pain is controlled with interventions in the ED and he is tolerating POs. I will prescribe supportive medications and  Flomax to facilitate stone passage. I have advised him to return for uncontrolled pain, vomiting, fever, or any other concerning symptoms. I also advised to strain his urine to look for a stone and submit it to his primary doctor for lab analysis.  Finally, I have advised follow up with urology.          Diagnosis:    ICD-10-CM    1. Ureterolithiasis  N20.1 Adult Urology  Referral       Discharge Medications:  Discharge Medication List as of 9/20/2022 12:23 AM      START taking these medications    Details   ketorolac (TORADOL) 10 MG tablet Take 1 tablet (10 mg) by mouth every 6 hours as needed for moderate pain, Disp-20 tablet, R-0, E-Prescribe      ondansetron (ZOFRAN ODT) 4 MG ODT tab Take 1 tablet (4 mg) by mouth every 8 hours as needed for nausea or vomiting, Disp-10 tablet, R-0, E-Prescribe      oxyCODONE (ROXICODONE) 5 MG tablet Take 1 tablet (5 mg) by mouth every 4 hours as needed for severe pain, Disp-12 tablet, R-0, E-Prescribe      tamsulosin (FLOMAX) 0.4 MG capsule Take 1 capsule (0.4 mg) by mouth daily for 10 doses, Disp-10 capsule, R-0, E-Prescribe             Scribe Disclosure:  Aminta MARTINEZ, am serving as a scribe at 12:03 AM on 9/20/2022 to document  services personally performed by Aldo Cook MD based on my observations and the provider's statements to me.            Aldo Cook MD  09/20/22 2545

## 2022-09-22 ENCOUNTER — VIRTUAL VISIT (OUTPATIENT)
Dept: UROLOGY | Facility: CLINIC | Age: 47
End: 2022-09-22
Payer: COMMERCIAL

## 2022-09-22 DIAGNOSIS — N13.2 HYDRONEPHROSIS WITH URINARY OBSTRUCTION DUE TO URETERAL CALCULUS: ICD-10-CM

## 2022-09-22 DIAGNOSIS — R10.9 ACUTE LEFT FLANK PAIN: ICD-10-CM

## 2022-09-22 DIAGNOSIS — N20.1 URETEROLITHIASIS: ICD-10-CM

## 2022-09-22 DIAGNOSIS — N20.1 CALCULUS OF URETER: Primary | ICD-10-CM

## 2022-09-22 DIAGNOSIS — R11.0 NAUSEA: ICD-10-CM

## 2022-09-22 PROCEDURE — 99203 OFFICE O/P NEW LOW 30 MIN: CPT | Mod: 95 | Performed by: PHYSICIAN ASSISTANT

## 2022-09-22 RX ORDER — OXYCODONE HYDROCHLORIDE 5 MG/1
5 TABLET ORAL EVERY 6 HOURS PRN
Qty: 12 TABLET | Refills: 0 | Status: SHIPPED | OUTPATIENT
Start: 2022-09-22 | End: 2022-09-25

## 2022-09-22 RX ORDER — LISINOPRIL AND HYDROCHLOROTHIAZIDE 20; 25 MG/1; MG/1
1 TABLET ORAL AT BEDTIME
COMMUNITY
Start: 2022-08-14 | End: 2023-08-02

## 2022-09-22 ASSESSMENT — PAIN SCALES - GENERAL: PAINLEVEL: MILD PAIN (2)

## 2022-09-22 NOTE — PROGRESS NOTES
Patient is roomed via telephone for a virtual visit.  Patient confirmed he is in the [facility] at the time of this appointment.  Patient understands that this visit is billable and agree to proceed with appointment.
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
## 2022-09-22 NOTE — PATIENT INSTRUCTIONS
Patient Stated Goal: Pass my stone  Symptom Control While Passing A Stone    The goal of [facility] is to let a smaller kidney stone (less than 4 to 5 mm) pass without intervention if possible. Giving your body a chance to clear the stone may take a few hours up to a few weeks.  Keeping you well-informed, safe and fairly comfortable is important.    Drink to thirst  Do not attempt to  flush out  your stone by drinking too much fluid. This does not work and may increase nausea. Drink enough to satisfy your body s thirst. Eating your normal diet is fine.   Medications (that may be suggested or prescribed)    Ibuprofen (Advil or Motrin) Available over the counter  o Take two (200mg) tablets every six hours until the stone passes.  o Prevents spasm of the ureter.    o Decreases pain.      Dramamine* (drowsy version, non-generic formulation) Available over the counter  o Take 50mg at bedtime  o Decreases spasms of the ureter  o Decreases nausea  o Decreases acute pain  o Decreases recurrence of pain for 24 hours  o Will help you sleep  *This medication will cause increase drowsiness, do not drive or operate machinery for 6 hours.      Narcotics (Percocet, Vicodin, Dilaudid) Take as prescribed for severe pain unrelieved by ibuprofen and Dramamine  o Narcotics have significant side effects and only  cover-up  pain. They have no effect on the cause of pain.  o Common side effects  - Confusion, disorientation and sedation - DO NOT DRIVE OR OPERATE MACHINERY WITHIN 24 HOURS  - Nausea - take Dramamine or Zofran or Haldol to help control  - Constipation  - Sleep disturbances      Ondansetron (Zofran) Take as prescribed  o Reserve for severe nausea  o May cause constipation, start over the counter Miralax if needed      Second Line Anti-Nausea Medication: Adding a different anti-nausea medication maybe helpful for persistent nausea.  The combined effect of different types of anti-nausea medications maybe more  effective than either medication by itself, even in higher doses.  o Compazine: Take as prescribed      Information about kidney stones    Crystals can form if chemicals are too concentrated in your urine. If the crystal grows over time, a stone may form. A stone usually isn t painful while it is still in the kidney.    As the stone begins to leave the kidney, you may experience episodes of flank pain as the kidney stone approaches the entrance to the ureter. Some people feel a vague ache in the side.    Kidney stones may fall into the ureter. Some stones are tiny and pass through without causing symptoms. The ureter is a small tube (approximately 1/8 of an inch wide). A kidney stone can get stuck and block the ureter. If this happens, urine backs up and flows back to the kidney. Back pressure on the kidney can cause:  o Severe flank pain radiating to the groin.  o Severe nausea and vomiting.  o The pain can occur in the lower back, side, groin or all three.      When the stone reaches the lower ureter, this can irritate the bladder and sensations of feeling the urge to urinate frequently and urgently may occur.      Once the stone passes out of your ureter and into your bladder, the symptoms of urgency and frequency will often disappear. Sometimes pain will come back for a short period and will not be as severe as before. The passage of the stone from your bladder and out of your body is usually not a problem. The urethra is at least twice as wide as the normal ureter, so the stone doesn t usually block it.    Strain all urine  If you pass the stone, save it. Place it in the container we have provided and bring it to the [facility] within a week of passing it. Your stone will then be sent for analysis which takes about a month.     Signs and symptoms you might experience    Nausea    Decreased appetite    Urinary frequency    Bloody urine     Chills    Fatigue    When to call [facility] or  go to the Emergency Room    Fever with a temperature greater than 100.1    Severe pain    Persistent nausea/vomiting    If the pain worsens or nausea/vomiting is uncontrolled with medications, STOP eating & drinking. You need to have an empty stomach for 8 hours prior to surgery. Call the [facility] immediately at [phone].           Follow-up    Low dose CT scan with doctor visit 1-2 weeks after initial clinic visit per doctor s instructions    Please cancel the CT scan visit if you pass a stone. Reschedule for a one month follow-up with doctor to discuss stone composition and future prevention.    Preventing future stones    Approximately a month after your stone is sent out for analysis, a prevention visit will occur with your provider, to discuss an individualized plan for prevention of new stones and to discuss managing stones that you may still have. Along with the analysis of the kidney stone, blood and urine tests may be indicated to develop this plan. Knowing the type of kidney stones you make, and why, allows the providers at the [facility] to recommend specific ways to prevent them.    Follow-up visits are an important part of monitoring and preventing future re-occurrences.    The [facility] is available for questions or concerns 24 hours a day at [phone]

## 2022-09-23 ENCOUNTER — TELEPHONE (OUTPATIENT)
Dept: UROLOGY | Facility: CLINIC | Age: 47
End: 2022-09-23

## 2022-10-10 ENCOUNTER — HEALTH MAINTENANCE LETTER (OUTPATIENT)
Age: 47
End: 2022-10-10

## 2022-12-21 ENCOUNTER — TELEPHONE (OUTPATIENT)
Dept: GASTROENTEROLOGY | Facility: CLINIC | Age: 47
End: 2022-12-21

## 2022-12-21 ENCOUNTER — TRANSCRIBE ORDERS (OUTPATIENT)
Dept: OTHER | Age: 47
End: 2022-12-21

## 2022-12-21 DIAGNOSIS — Z12.11 SCREEN FOR COLON CANCER: Primary | ICD-10-CM

## 2022-12-21 NOTE — TELEPHONE ENCOUNTER
Screening Questions  BLUE  KIND OF PREP RED  LOCATION [review exclusion criteria] GREEN  SEDATION TYPE        Y Are you active on mychart?       Mejia Chan Ordering/Referring Provider?        PO What type of coverage do you have?      N Have you had a positive covid test in the last 14 days?     38.5 1. BMI  [BMI 40+ - review exclusion criteria]    Y  2. Are you able to give consent for your medical care? [IF NO,RN REVIEW]        N  3. Are you taking any prescription pain medications on a routine schedule?      N  3a. EXTENDED PREP What kind of prescription?     N 4. Do you have any chemical dependencies such as alcohol, street drugs, or methadone?    ptsd 5. Do you have any history of post-traumatic stress syndrome, severe anxiety or history of psychosis?      **If yes 3- 5 , please schedule with MAC sedation.**          IF YES TO ANY 6 - 10 - HOSPITAL SETTING ONLY.     N 6.   Do you need assistance transferring?     N 7.   Have you had a heart or lung transplant?    N 8.   Are you currently on dialysis?   N 9.   Do you use daily home oxygen?   N 10. Do you take nitroglycerin?   10a. N If yes, how often?     11. [FEMALES]  N Are you currently pregnant?    11a. N If yes, how many weeks? [ Greater than 12 weeks, OR NEEDED]    N 12. Do you have Pulmonary Hypertension? *NEED PAC APPT AT [facility]*     N 13. [review exclusion criteria]  Do you have any implantable devices in your body (pacemaker, defib, LVAD)?    N 14. In the past 6 months, have you had any heart related issues including cardiomyopathy or heart attack?     14a. N If yes, did it require cardiac stenting if so when?     N 15. Have you had a stroke or Transient ischemic attack (TIA - aka  mini stroke ) within 6 months?      N 16. Do you have mod to severe Obstructive Sleep Apnea?  [Hospital only - Ok at [place]]    N 17. Do you have SEVERE AND UNCONTROLLED asthma? *NEED PAC APPT AT [facility]*     N 18. Are you currently taking any blood  "thinners?     18a. If yes, inform patient to \"follow up w/ ordering provider for bridging instructions.\"    N 19. Do you take the medication Phentermine?    19a. If yes, \"Hold for 7 days before procedure.  Please consult your prescribing provider if you have questions about holding this medication.\"     N  20. Do you have chronic kidney disease?     Y TYPE 2 21. Do you have a diagnosis of diabetes?    N 22. On a regular basis do you go 3-5 days between bowel movements?     N 23. Preferred LOCAL Pharmacy for Pre Prescription    [ LIST ONLY ONE PHARMACY]          [facility] #[id number] - [place], [place] - [address] & [facility]        - CLOSING REMINDERS -    Informed patient they will need an adult    Cannot take any type of public or medical transportation alone    Conscious Sedation- Needs  for 6 hours after the procedure       MAC/General-Needs  for 24 hours after procedure    Pre-Procedure Covid test to be completed [[facility] PCR Testing Required]    Confirmed Nurse will call to complete assessment       - SCHEDULING DETAILS -  N Hospital Setting Required? If yes, what is the exclusion?: JOSE WATSON  Surgeon    2/13/23  Date of Procedure  Lower Endoscopy [Colonoscopy]  Type of Procedure Scheduled  [facility] [place]ARD [facility]YTELY-If you answer yes to questions #8, #20, #21Which Colonoscopy Prep was Sent?     MAC PTSD Sedation Type     Y PAC / Pre-op Required                 "

## 2023-01-30 ENCOUNTER — TELEPHONE (OUTPATIENT)
Dept: GASTROENTEROLOGY | Facility: CLINIC | Age: 48
End: 2023-01-30

## 2023-01-30 DIAGNOSIS — Z12.11 ENCOUNTER FOR SCREENING COLONOSCOPY: Primary | ICD-10-CM

## 2023-01-30 RX ORDER — BISACODYL 5 MG
TABLET, DELAYED RELEASE (ENTERIC COATED) ORAL
Qty: 4 TABLET | Refills: 0 | Status: ON HOLD | OUTPATIENT
Start: 2023-01-30 | End: 2023-02-13

## 2023-01-30 NOTE — TELEPHONE ENCOUNTER
Pt returned call.     Pre assessment questions completed for upcoming Colonoscopy  procedure scheduled on 2/13/23    COVID policy reviewed.     Pre-op scheduled:  No, pt states his PCP clinic is with [facility] but pt wants to transition health services to [facility]. Pt requests to schedule pre op with PAC. Transferred to [phone] and provided phone number to pt in case call disconnected. Pt aware pre op physical is required.    Reviewed procedural arrival time 1245, procedure time 1345 and facility location [facility]; [address] [place] [address]    Designated  policy reviewed. Instructed to have someone stay 24 hours post procedure.     Diabetic? Yes - Patient to hold oral diabetic medications day of procedure    Bowel prep recommendation: Standard Golytely    Pt no longer using oxycodone. No concern for constipation    Reviewed procedure prep instructions.     Prep instructions sent via [employer].      Bowel prep script sent to    [facility] #[id number] - [place], [place] - [address] AT [address] [place] [address].     Patient verbalized understanding and had no questions or concerns at this time.    Seble Frances RN  Endoscopy Procedure Pre Assessment RN

## 2023-01-30 NOTE — TELEPHONE ENCOUNTER
Patient scheduled for Colonoscopy  on 2/13/23.     Discuss Covid policy.     Pre op exam scheduled: No , pt needs to schedule pre op physical with PCP clinic or PAC if needed (#[phone])    Arrival time: 1245. Procedure time 1345    Facility location: [facility]; [facility] [address] [place] [address]    Sedation type: MAC    Anticoagulations? No    Electronic implanted devices? No    Diabetic? Yes - Patient to hold oral diabetic medications day of procedure    Indication for procedure: Screening    Bowel prep recommendation: Verify oxycodone use, might need extended prep. Send bowel prep & instructions once prep determined.    Prep instructions not yet sent.    Bowel prep script sent not yet sent.    Preferred pharmacy:  [id number] - [place] - [address]    Pre visit planning completed.    Seble Frances RN  Endoscopy Procedure Pre Assessment RN
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
## 2023-01-30 NOTE — TELEPHONE ENCOUNTER
Attempted to contact patient regarding upcoming Colonoscopy procedure on 2/13/23 for pre assessment questions. No answer.     Left message to return call to [phone] #4       Seble Frances RN  Endoscopy Procedure Pre Assessment RN

## 2023-02-07 ENCOUNTER — OFFICE VISIT (OUTPATIENT)
Dept: SURGERY | Facility: CLINIC | Age: 48
End: 2023-02-07
Payer: COMMERCIAL

## 2023-02-07 ENCOUNTER — PRE VISIT (OUTPATIENT)
Dept: SURGERY | Facility: CLINIC | Age: 48
End: 2023-02-07

## 2023-02-07 ENCOUNTER — ANESTHESIA EVENT (OUTPATIENT)
Dept: GASTROENTEROLOGY | Facility: CLINIC | Age: 48
End: 2023-02-07
Payer: COMMERCIAL

## 2023-02-07 VITALS
RESPIRATION RATE: 16 BRPM | SYSTOLIC BLOOD PRESSURE: 122 MMHG | OXYGEN SATURATION: 95 % | BODY MASS INDEX: 41.49 KG/M2 | TEMPERATURE: 98.4 F | HEIGHT: 68 IN | HEART RATE: 74 BPM | WEIGHT: 273.8 LBS | DIASTOLIC BLOOD PRESSURE: 82 MMHG

## 2023-02-07 DIAGNOSIS — Z01.818 PREOP EXAMINATION: Primary | ICD-10-CM

## 2023-02-07 DIAGNOSIS — Z12.11 SCREENING FOR COLON CANCER: ICD-10-CM

## 2023-02-07 PROCEDURE — 99205 OFFICE O/P NEW HI 60 MIN: CPT | Performed by: CLINICAL NURSE SPECIALIST

## 2023-02-07 RX ORDER — METFORMIN HCL 500 MG
2000 TABLET, EXTENDED RELEASE 24 HR ORAL EVERY EVENING
COMMUNITY
Start: 2022-06-06 | End: 2023-08-02

## 2023-02-07 ASSESSMENT — ENCOUNTER SYMPTOMS
DYSRHYTHMIAS: 0
SEIZURES: 0

## 2023-02-07 ASSESSMENT — LIFESTYLE VARIABLES: TOBACCO_USE: 0

## 2023-02-07 ASSESSMENT — PAIN SCALES - GENERAL: PAINLEVEL: NO PAIN (0)
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
## 2023-02-07 NOTE — OR NURSING
Was asked by [employer] [facility] to go over medications with Gage Carter for the upcoming GI procedure on 2/13/23. Verbally given medication instructions and written instructions. Gage has no questions at this time.
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
## 2023-02-07 NOTE — PATIENT INSTRUCTIONS
Name:  Gage Carter   MRN:  [id number]   :  1975   Today's Date:  2023     GI Lab procedures:    A representative from the GI Lab will contact you regarding date, arrival time, location, where to report to, and give you further instructions.      You were seen today in the PAC Clinic.   (Pre-operative Anesthesia Assessment Center)  [facility]  [address] [place] [address]  [id number]   phone [phone]    You had a pre-operative assessment done.    Anesthesia recommendations for medications:    Hold Aspirin for 2 days before procedure.  Hold Multivitamins for 7 days before procedure.   Hold Herbal medications and Supplements for 7 days before procedure.  Hold Ibuprofen for 2 days before procedure.   Hold Naproxen for 2 days before procedure.      May take your usual scheduled medications the evening prior to surgery.      For questions or appointments, call:  Please reach out to the clinic or provider that scheduled your appointment.

## 2023-02-09 NOTE — TELEPHONE ENCOUNTER
Upon chart review, PAC/pre op completed on 2/7/23 with STACIA Roldan CNS.      Seble Frances, [facility]  Endoscopy Procedure Pre-Assessment RN

## 2023-02-13 ENCOUNTER — HOSPITAL ENCOUNTER (OUTPATIENT)
Facility: CLINIC | Age: 48
Discharge: HOME | End: 2023-02-13
Attending: INTERNAL MEDICINE | Admitting: INTERNAL MEDICINE
Payer: COMMERCIAL

## 2023-02-13 ENCOUNTER — ANESTHESIA (OUTPATIENT)
Dept: GASTROENTEROLOGY | Facility: CLINIC | Age: 48
End: 2023-02-13
Payer: COMMERCIAL

## 2023-02-13 VITALS
DIASTOLIC BLOOD PRESSURE: 85 MMHG | SYSTOLIC BLOOD PRESSURE: 126 MMHG | BODY MASS INDEX: 39.86 KG/M2 | RESPIRATION RATE: 16 BRPM | HEIGHT: 68 IN | HEART RATE: 68 BPM | OXYGEN SATURATION: 94 % | WEIGHT: 263 LBS

## 2023-02-13 LAB
COLONOSCOPY: NORMAL
GLUCOSE BLDC GLUCOMTR-MCNC: 87 MG/DL (ref 70–99)

## 2023-02-13 PROCEDURE — 82962 GLUCOSE BLOOD TEST: CPT

## 2023-02-13 PROCEDURE — 250N000011 HC RX IP 250 OP 636: Performed by: ANESTHESIOLOGY

## 2023-02-13 PROCEDURE — 250N000009 HC RX 250: Performed by: ANESTHESIOLOGY

## 2023-02-13 PROCEDURE — G0121 COLON CA SCRN NOT HI RSK IND: HCPCS | Performed by: INTERNAL MEDICINE

## 2023-02-13 PROCEDURE — 370N000017 HC ANESTHESIA TECHNICAL FEE, PER MIN: Performed by: INTERNAL MEDICINE

## 2023-02-13 PROCEDURE — 258N000003 HC RX IP 258 OP 636: Performed by: ANESTHESIOLOGY

## 2023-02-13 PROCEDURE — 45378 DIAGNOSTIC COLONOSCOPY: CPT | Performed by: INTERNAL MEDICINE

## 2023-02-13 PROCEDURE — 999N000010 HC STATISTIC ANES STAT CODE-CRNA PER MINUTE: Performed by: INTERNAL MEDICINE

## 2023-02-13 RX ORDER — ONDANSETRON 4 MG/1
4 TABLET, ORALLY DISINTEGRATING ORAL EVERY 6 HOURS PRN
Status: CANCELLED | OUTPATIENT
Start: 2023-02-13

## 2023-02-13 RX ORDER — ONDANSETRON 2 MG/ML
4 INJECTION INTRAMUSCULAR; INTRAVENOUS
Status: CANCELLED | OUTPATIENT
Start: 2023-02-13

## 2023-02-13 RX ORDER — PROPOFOL 10 MG/ML
INJECTION, EMULSION INTRAVENOUS CONTINUOUS PRN
Status: DISCONTINUED | OUTPATIENT
Start: 2023-02-13 | End: 2023-02-13

## 2023-02-13 RX ORDER — NALOXONE HYDROCHLORIDE 0.4 MG/ML
0.4 INJECTION, SOLUTION INTRAMUSCULAR; INTRAVENOUS; SUBCUTANEOUS
Status: CANCELLED | OUTPATIENT
Start: 2023-02-13

## 2023-02-13 RX ORDER — SODIUM CHLORIDE, SODIUM LACTATE, POTASSIUM CHLORIDE, CALCIUM CHLORIDE 600; 310; 30; 20 MG/100ML; MG/100ML; MG/100ML; MG/100ML
INJECTION, SOLUTION INTRAVENOUS CONTINUOUS PRN
Status: DISCONTINUED | OUTPATIENT
Start: 2023-02-13 | End: 2023-02-13

## 2023-02-13 RX ORDER — FLUMAZENIL 0.1 MG/ML
0.2 INJECTION, SOLUTION INTRAVENOUS
Status: CANCELLED | OUTPATIENT
Start: 2023-02-13 | End: 2023-02-14

## 2023-02-13 RX ORDER — NALOXONE HYDROCHLORIDE 0.4 MG/ML
0.2 INJECTION, SOLUTION INTRAMUSCULAR; INTRAVENOUS; SUBCUTANEOUS
Status: CANCELLED | OUTPATIENT
Start: 2023-02-13

## 2023-02-13 RX ORDER — LIDOCAINE 40 MG/G
CREAM TOPICAL
Status: CANCELLED | OUTPATIENT
Start: 2023-02-13

## 2023-02-13 RX ORDER — LIDOCAINE HYDROCHLORIDE 20 MG/ML
INJECTION, SOLUTION INFILTRATION; PERINEURAL PRN
Status: DISCONTINUED | OUTPATIENT
Start: 2023-02-13 | End: 2023-02-13

## 2023-02-13 RX ORDER — PROCHLORPERAZINE MALEATE 10 MG
10 TABLET ORAL EVERY 6 HOURS PRN
Status: CANCELLED | OUTPATIENT
Start: 2023-02-13

## 2023-02-13 RX ORDER — ONDANSETRON 2 MG/ML
4 INJECTION INTRAMUSCULAR; INTRAVENOUS EVERY 6 HOURS PRN
Status: CANCELLED | OUTPATIENT
Start: 2023-02-13

## 2023-02-13 RX ADMIN — SODIUM CHLORIDE, POTASSIUM CHLORIDE, SODIUM LACTATE AND CALCIUM CHLORIDE: 600; 310; 30; 20 INJECTION, SOLUTION INTRAVENOUS at 14:15

## 2023-02-13 RX ADMIN — PROPOFOL 300 MCG/KG/MIN: 10 INJECTION, EMULSION INTRAVENOUS at 14:16

## 2023-02-13 RX ADMIN — LIDOCAINE HYDROCHLORIDE 20 MG: 20 INJECTION, SOLUTION INFILTRATION; PERINEURAL at 14:16

## 2023-02-13 ASSESSMENT — ACTIVITIES OF DAILY LIVING (ADL)
ADLS_ACUITY_SCORE: 35
ADLS_ACUITY_SCORE: 35

## 2023-02-13 NOTE — INTERVAL H&P NOTE
"I have reviewed the surgical (or preoperative) H&P that is linked to this encounter, and examined the patient. There are no significant changes    Clinical Conditions Present on Arrival:  Clinically Significant Risk Factors Present on Admission                    # Severe Obesity: Estimated body mass index is 41.63 kg/m  as calculated from the following:    Height as of 2/7/23: 1.727 m (5' 8\").    Weight as of 2/7/23: 124.2 kg (273 lb 12.8 oz).       "

## 2023-02-13 NOTE — DISCHARGE INSTRUCTIONS
Understanding Diverticulosis and Diverticulitis  The colon (large intestine) is the last part of the digestive tract. It absorbs water from stool and changes it from a liquid to a solid. In certain cases, small pouches called diverticula can form in the colon wall. This condition is called diverticulosis . It's very common as people get older. The pouches can become infected. If this happens, it becomes a more serious problem called diverticulitis . These problems can be painful. But they can be managed.     Pouches or diverticula usually occur in the lower part of the colon called the sigmoid.      Diverticulitis occurs when the pouches become infected or inflamed.   Managing your condition  Diet changes or medicines may be prescribed.   If you have diverticulosis  Recommendations include:  Diet changes are often enough to control symptoms. The main changes are adding fiber (roughage) and drinking more water. Fiber absorbs water as it travels through your colon. This helps your stool stay soft and move smoothly. Water helps this process.  If needed, you may be told to take over-the-counter stool softeners.  To help ease pain, antispasmodic medicines may be prescribed.  Watch for changes in your bowel movements. Tell your healthcare provider if you notice any changes.  Begin an exercise program. Ask your healthcare provider how to get started.  Get plenty of rest and sleep.   If you have diverticulitis  Treatment depends on how bad your symptoms are.  For mild symptoms. You may be put on a liquid diet for a short time. Antibiotics are usually prescribed. If these 2 steps relieve your symptoms, you may then be prescribed a high-fiber diet. If you still have symptoms, your healthcare provider will discuss more treatment choices with you.  For severe symptoms. You may need to be admitted to the hospital. There, you can be given IV antibiotics and fluids. You will also be put on a low-fiber or liquid diet. Although  not common, surgery is needed in some people with severe symptoms.  [place] to colon health  Help keep your colon healthy with a diet that includes plenty of high-fiber fruits, vegetables, and whole grains. Drink plenty of liquids like water and juice. Maintain a healthy lifestyle, including regular exercise, stress management, and adequate rest and sleep.   [employer] last reviewed this educational content on 4/1/2019 2000-2021 [employer]. All rights reserved. This information is not intended as a substitute for professional medical care. Always follow your healthcare professional's instructions.            High-Fiber Diet  Fiber is in fruits, vegetables, cereals, and grains. Fiber passes through your body undigested. A high-fiber diet helps food move through your intestinal tract. The added bulk can help prevent constipation. In people with small pouches in the colon (diverticulosis), fiber helps clean out the pouches along the colon wall. It also prevents new pouches from forming. A high-fiber diet reduces the risk of colon cancer. It also lowers blood cholesterol and prevents high blood sugar in people with diabetes.     The high-fiber foods that are listed below should be part of your diet. If you are not used to eating high-fiber foods, start with 1 or 2 foods from this list. Every 3 to 4 days add a new food to your diet. Do this until you are eating 4 high-fiber foods per day. This should give you 20 to 35 grams of fiber a day. You need to drink a lot of water when you are on this diet. You should have 6 to 8 glasses of water a day. Water makes the fiber swell and increases the benefit.   Foods high in fiber  These foods are high in fiber:  Breads. Breads made with 100% whole-wheat flour; alia, wheat, or rye crackers; whole-grain tortillas, bran muffins  Cereals. Whole-grain and bran cereals with bran (shredded wheat, wheat flakes, raisin bran, corn bran); oatmeal, rolled oats, granola, and  brown rice  Fruits. Fresh fruits and their edible skins (pears, prunes, raisins, berries, apples, and apricots); bananas, citrus fruit, mangoes, pineapple; and prune juice  Nuts and seeds. Any nuts and seeds, such as walnuts, peanuts, almonds, and pumpkin seeds  Vegetables. This includes all vegetables. They are best served raw or lightly cooked. Those higher in fiber include green peas, celery, eggplant, potatoes, spinach, broccoli, [place] sprouts, winter squash, carrots, cauliflower, soybeans, lentils, and fresh and dried beans of all kinds.  Other. Popcorn; any spices  If you have diverticulosis  There aren't any specific foods to avoid if you have diverticulosis. But each person is different. There may be some foods that make your symptoms worse. Keep track and don t eat foods that make you feel worse.   [employer] last reviewed this educational content on 2/1/2020 2000-2021 [employer]. All rights reserved. This information is not intended as a substitute for professional medical care. Always follow your healthcare professional's instructions.
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
## 2023-02-13 NOTE — ANESTHESIA CARE TRANSFER NOTE
Patient: Gage Carter    Procedure: Procedure(s):  COLONOSCOPY       Diagnosis: Screen for colon cancer [Z12.11]  Diagnosis Additional Information: No value filed.    Anesthesia Type:   MAC     Note:    Oropharynx: oropharynx clear of all foreign objects and spontaneously breathing  Level of Consciousness: awake  Oxygen Supplementation: nasal cannula  Level of Supplemental Oxygen (L/min / FiO2): 2  Independent Airway: airway patency satisfactory and stable  Dentition: dentition unchanged  Vital Signs Stable: post-procedure vital signs reviewed and stable  Report to RN Given: handoff report given  Patient transferred to: Phase II    Handoff Report: Identifed the Patient, Identified the Reponsible Provider, Reviewed the pertinent medical history, Discussed the surgical course, Reviewed Intra-OP anesthesia mangement and issues during anesthesia, Set expectations for post-procedure period and Allowed opportunity for questions and acknowledgement of understanding      Vitals:  Vitals Value Taken Time   /66 02/13/23 1439   Temp 36.6    Pulse 71 02/13/23 1441   Resp 16 02/13/23 1441   SpO2 97 % 02/13/23 1441   Vitals shown include unvalidated device data.    Electronically Signed By: STACIA Varner CRNA  February 13, 2023  2:42 PM

## 2023-02-13 NOTE — ANESTHESIA POSTPROCEDURE EVALUATION
Patient: Gage Carter    Procedure: Procedure(s):  COLONOSCOPY       Anesthesia Type:  MAC    Note:  Disposition: Outpatient   Postop Pain Control: Uneventful            Sign Out: Well controlled pain   PONV: No   Neuro/Psych: Uneventful            Sign Out: Acceptable/Baseline neuro status   Airway/Respiratory: Uneventful            Sign Out: Acceptable/Baseline resp. status   CV/Hemodynamics: Uneventful            Sign Out: Acceptable CV status   Other NRE: NONE   DID A NON-ROUTINE EVENT OCCUR? No           Last vitals:  Vitals Value Taken Time   /85 02/13/23 1510   Temp     Pulse 64 02/13/23 1510   Resp 9 02/13/23 1510   SpO2 93 % 02/13/23 1512   Vitals shown include unvalidated device data.    Electronically Signed By: Kane Hubbard MD  February 13, 2023  3:17 PM
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
## 2023-04-15 ENCOUNTER — TELEPHONE (OUTPATIENT)
Dept: FAMILY MEDICINE | Facility: CLINIC | Age: 48
End: 2023-04-15

## 2023-04-15 NOTE — TELEPHONE ENCOUNTER
Patient would like to be called for a diabetes check if there are any cancellations sooner than 07/12/23 with Dr. Devika Hernandes.     [phone]

## 2023-04-27 ENCOUNTER — TRANSFERRED RECORDS (OUTPATIENT)
Dept: HEALTH INFORMATION MANAGEMENT | Facility: CLINIC | Age: 48
End: 2023-04-27

## 2023-05-17 ENCOUNTER — OFFICE VISIT (OUTPATIENT)
Dept: FAMILY MEDICINE | Facility: CLINIC | Age: 48
End: 2023-05-17
Payer: COMMERCIAL

## 2023-05-17 VITALS
BODY MASS INDEX: 39.99 KG/M2 | OXYGEN SATURATION: 94 % | HEART RATE: 86 BPM | SYSTOLIC BLOOD PRESSURE: 110 MMHG | TEMPERATURE: 97.8 F | WEIGHT: 270 LBS | RESPIRATION RATE: 18 BRPM | DIASTOLIC BLOOD PRESSURE: 76 MMHG | HEIGHT: 69 IN

## 2023-05-17 DIAGNOSIS — H61.21 IMPACTED CERUMEN OF RIGHT EAR: Primary | ICD-10-CM

## 2023-05-17 PROCEDURE — 69210 REMOVE IMPACTED EAR WAX UNI: CPT | Performed by: FAMILY MEDICINE

## 2023-05-17 ASSESSMENT — PAIN SCALES - GENERAL: PAINLEVEL: MILD PAIN (2)

## 2023-05-17 ASSESSMENT — PATIENT HEALTH QUESTIONNAIRE - PHQ9
10. IF YOU CHECKED OFF ANY PROBLEMS, HOW DIFFICULT HAVE THESE PROBLEMS MADE IT FOR YOU TO DO YOUR WORK, TAKE CARE OF THINGS AT HOME, OR GET ALONG WITH OTHER PEOPLE: NOT DIFFICULT AT ALL
SUM OF ALL RESPONSES TO PHQ QUESTIONS 1-9: 1
SUM OF ALL RESPONSES TO PHQ QUESTIONS 1-9: 1

## 2023-05-17 NOTE — PROGRESS NOTES
Assessment & Plan     Impacted cerumen of right ear  I removed a large amount of cerumen myself using the lighted curette during the appointment today.  The medical assistant was able to flush out the remaining using saline irrigation.  He tolerated this procedure well.  Tips on how to prevent wax buildup in the future were given.  - GA REMOVAL IMPACTED CERUMEN IRRIGATION/LVG UNILAT                 Rahul Zuleta DO  [facility]    Shy Almonte is a 48 year old, presenting for the following health issues:  Ear Problem        5/17/2023     3:15 PM   Additional Questions   Roomed by Robert LEAL     History of Present Illness       Reason for visit:  Ear and hearing loss right side  Symptom onset:  1-2 weeks ago    He eats 0-1 servings of fruits and vegetables daily.He consumes 0 sweetened beverage(s) daily.He exercises with enough effort to increase his heart rate 20 to 29 minutes per day.  He exercises with enough effort to increase his heart rate 3 or less days per week.   He is taking medications regularly.    Today's PHQ-9         PHQ-9 Total Score: 1    PHQ-9 Q9 Thoughts of better off dead/self-harm past 2 weeks :   Not at all    How difficult have these problems made it for you to do your work, take care of things at home, or get along with other people: Not difficult at all    Ear wash, known blockage.   Mild pain in ears, mostly just hearing hearing loss.    Over the past 10 days he has noticed worsening plugging symptoms in the right ear.  This is impacting his hearing and recently started experiencing pain symptoms.  He denies having any drainage.  He is not feeling ill.  He denies having fevers or congestion symptoms.            Review of Systems   Constitutional, HEENT, cardiovascular, pulmonary, GI, , musculoskeletal, neuro, skin, endocrine and psych systems are negative, except as otherwise noted.      Objective    /76   Pulse 86   Temp 97.8  F (36.6  C)  "(Temporal)   Resp 18   Ht 1.753 m (5' 9\")   Wt 122.5 kg (270 lb)   SpO2 94%   BMI 39.87 kg/m    Body mass index is 39.87 kg/m .  Physical Exam   GENERAL: healthy, alert and no distress  EYES: Eyes grossly normal to inspection, PERRL and conjunctivae and sclerae normal  HENT: The left canal and tympanic membrane is clear.  The right canal is impacted with cerumen.  Once this was removed the canal and tympanic membrane on the right side appeared clear.  Nose and mouth without ulcers or lesions  NECK: no adenopathy, no asymmetry, masses, or scars and thyroid normal to palpation  RESP: lungs clear to auscultation - no rales, rhonchi or wheezes  CV: regular rate and rhythm, normal S1 S2, no S3 or S4, no murmur, click or rub, no peripheral edema and peripheral pulses strong  PSYCH: mentation appears normal, affect normal/bright                    "

## 2023-07-11 ENCOUNTER — TRANSFERRED RECORDS (OUTPATIENT)
Dept: MULTI SPECIALTY CLINIC | Facility: CLINIC | Age: 48
End: 2023-07-11
Payer: COMMERCIAL

## 2023-07-11 LAB
RETINOPATHY: NEGATIVE
RETINOPATHY: NORMAL

## 2023-07-12 ENCOUNTER — OFFICE VISIT (OUTPATIENT)
Dept: FAMILY MEDICINE | Facility: CLINIC | Age: 48
End: 2023-07-12
Payer: COMMERCIAL

## 2023-07-12 VITALS
SYSTOLIC BLOOD PRESSURE: 122 MMHG | OXYGEN SATURATION: 98 % | HEIGHT: 69 IN | TEMPERATURE: 97.2 F | DIASTOLIC BLOOD PRESSURE: 72 MMHG | HEART RATE: 98 BPM | WEIGHT: 277.5 LBS | BODY MASS INDEX: 41.1 KG/M2 | RESPIRATION RATE: 18 BRPM

## 2023-07-12 DIAGNOSIS — E11.9 TYPE 2 DIABETES MELLITUS WITHOUT COMPLICATION, WITHOUT LONG-TERM CURRENT USE OF INSULIN (H): Primary | ICD-10-CM

## 2023-07-12 DIAGNOSIS — E66.813 CLASS 3 OBESITY: ICD-10-CM

## 2023-07-12 DIAGNOSIS — Z11.59 NEED FOR HEPATITIS C SCREENING TEST: ICD-10-CM

## 2023-07-12 DIAGNOSIS — Z76.89 ENCOUNTER TO ESTABLISH CARE WITH NEW DOCTOR: ICD-10-CM

## 2023-07-12 DIAGNOSIS — Z12.5 SCREENING FOR PROSTATE CANCER: ICD-10-CM

## 2023-07-12 PROBLEM — I10 ESSENTIAL HYPERTENSION: Status: ACTIVE | Noted: 2021-02-17

## 2023-07-12 PROBLEM — Z80.42 FH: PROSTATE CANCER: Status: ACTIVE | Noted: 2020-12-29

## 2023-07-12 PROBLEM — F43.10 POSTTRAUMATIC STRESS DISORDER: Status: ACTIVE | Noted: 2018-11-15

## 2023-07-12 PROBLEM — S06.9X9A TRAUMATIC BRAIN INJURY WITH LOSS OF CONSCIOUSNESS (H): Status: ACTIVE | Noted: 2018-08-06

## 2023-07-12 LAB
ANION GAP SERPL CALCULATED.3IONS-SCNC: 13 MMOL/L (ref 7–15)
BUN SERPL-MCNC: 15.6 MG/DL (ref 6–20)
CALCIUM SERPL-MCNC: 9.8 MG/DL (ref 8.6–10)
CHLORIDE SERPL-SCNC: 100 MMOL/L (ref 98–107)
CREAT SERPL-MCNC: 0.98 MG/DL (ref 0.67–1.17)
CREAT UR-MCNC: 77.8 MG/DL
DEPRECATED HCO3 PLAS-SCNC: 23 MMOL/L (ref 22–29)
GFR SERPL CREATININE-BSD FRML MDRD: >90 ML/MIN/1.73M2
GLUCOSE SERPL-MCNC: 409 MG/DL (ref 70–99)
HBA1C MFR BLD: 9.1 % (ref 0–5.6)
MICROALBUMIN UR-MCNC: 38.2 MG/L
MICROALBUMIN/CREAT UR: 49.1 MG/G CR (ref 0–17)
POTASSIUM SERPL-SCNC: 4.4 MMOL/L (ref 3.4–5.3)
PSA SERPL DL<=0.01 NG/ML-MCNC: 0.7 NG/ML (ref 0–3.5)
SODIUM SERPL-SCNC: 136 MMOL/L (ref 136–145)
TSH SERPL DL<=0.005 MIU/L-ACNC: 2.7 UIU/ML (ref 0.3–4.2)

## 2023-07-12 PROCEDURE — 82570 ASSAY OF URINE CREATININE: CPT | Performed by: FAMILY MEDICINE

## 2023-07-12 PROCEDURE — 36415 COLL VENOUS BLD VENIPUNCTURE: CPT | Performed by: FAMILY MEDICINE

## 2023-07-12 PROCEDURE — 84443 ASSAY THYROID STIM HORMONE: CPT | Performed by: FAMILY MEDICINE

## 2023-07-12 PROCEDURE — 80061 LIPID PANEL: CPT | Performed by: FAMILY MEDICINE

## 2023-07-12 PROCEDURE — 86803 HEPATITIS C AB TEST: CPT | Performed by: FAMILY MEDICINE

## 2023-07-12 PROCEDURE — 99214 OFFICE O/P EST MOD 30 MIN: CPT | Performed by: FAMILY MEDICINE

## 2023-07-12 PROCEDURE — G0103 PSA SCREENING: HCPCS | Performed by: FAMILY MEDICINE

## 2023-07-12 PROCEDURE — 80048 BASIC METABOLIC PNL TOTAL CA: CPT | Performed by: FAMILY MEDICINE

## 2023-07-12 PROCEDURE — 82043 UR ALBUMIN QUANTITATIVE: CPT | Performed by: FAMILY MEDICINE

## 2023-07-12 PROCEDURE — 83721 ASSAY OF BLOOD LIPOPROTEIN: CPT | Mod: 59 | Performed by: FAMILY MEDICINE

## 2023-07-12 PROCEDURE — 83036 HEMOGLOBIN GLYCOSYLATED A1C: CPT | Performed by: FAMILY MEDICINE

## 2023-07-12 RX ORDER — PROCHLORPERAZINE 25 MG/1
1 SUPPOSITORY RECTAL ONCE
Qty: 1 EACH | Refills: 0 | Status: SHIPPED | OUTPATIENT
Start: 2023-07-12 | End: 2023-07-12

## 2023-07-12 RX ORDER — PROCHLORPERAZINE 25 MG/1
1 SUPPOSITORY RECTAL
Qty: 1 EACH | Refills: 1 | Status: SHIPPED | OUTPATIENT
Start: 2023-07-12 | End: 2023-08-07

## 2023-07-12 RX ORDER — ATORVASTATIN CALCIUM 40 MG/1
1 TABLET, FILM COATED ORAL
COMMUNITY
Start: 2023-05-01 | End: 2024-07-22

## 2023-07-12 ASSESSMENT — PAIN SCALES - GENERAL: PAINLEVEL: MILD PAIN (2)
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
## 2023-07-13 LAB
CHOLEST SERPL-MCNC: 139 MG/DL
HCV AB SERPL QL IA: NONREACTIVE
HDLC SERPL-MCNC: 22 MG/DL
LDLC SERPL CALC-MCNC: ABNORMAL MG/DL
LDLC SERPL DIRECT ASSAY-MCNC: 38 MG/DL
NONHDLC SERPL-MCNC: 117 MG/DL
TRIGL SERPL-MCNC: 678 MG/DL

## 2023-07-25 ENCOUNTER — NURSE TRIAGE (OUTPATIENT)
Dept: FAMILY MEDICINE | Facility: CLINIC | Age: 48
End: 2023-07-25
Payer: COMMERCIAL

## 2023-07-25 ENCOUNTER — HOSPITAL ENCOUNTER (EMERGENCY)
Facility: CLINIC | Age: 48
Discharge: HOME | End: 2023-07-26
Attending: EMERGENCY MEDICINE | Admitting: EMERGENCY MEDICINE
Payer: COMMERCIAL

## 2023-07-25 ENCOUNTER — APPOINTMENT (OUTPATIENT)
Dept: GENERAL RADIOLOGY | Facility: CLINIC | Age: 48
End: 2023-07-25
Attending: EMERGENCY MEDICINE
Payer: COMMERCIAL

## 2023-07-25 DIAGNOSIS — R42 VERTIGO: ICD-10-CM

## 2023-07-25 LAB
ANION GAP SERPL CALCULATED.3IONS-SCNC: 15 MMOL/L (ref 7–15)
BASOPHILS # BLD AUTO: 0 10E3/UL (ref 0–0.2)
BASOPHILS NFR BLD AUTO: 1 %
BUN SERPL-MCNC: 18.3 MG/DL (ref 6–20)
CALCIUM SERPL-MCNC: 9.7 MG/DL (ref 8.6–10)
CHLORIDE SERPL-SCNC: 99 MMOL/L (ref 98–107)
CREAT SERPL-MCNC: 0.98 MG/DL (ref 0.67–1.17)
DEPRECATED HCO3 PLAS-SCNC: 22 MMOL/L (ref 22–29)
EOSINOPHIL # BLD AUTO: 0.2 10E3/UL (ref 0–0.7)
EOSINOPHIL NFR BLD AUTO: 2 %
ERYTHROCYTE [DISTWIDTH] IN BLOOD BY AUTOMATED COUNT: 12.2 % (ref 10–15)
GFR SERPL CREATININE-BSD FRML MDRD: >90 ML/MIN/1.73M2
GLUCOSE BLDC GLUCOMTR-MCNC: 157 MG/DL (ref 70–99)
GLUCOSE SERPL-MCNC: 174 MG/DL (ref 70–99)
HCT VFR BLD AUTO: 44.7 % (ref 40–53)
HGB BLD-MCNC: 15.6 G/DL (ref 13.3–17.7)
IMM GRANULOCYTES # BLD: 0 10E3/UL
IMM GRANULOCYTES NFR BLD: 0 %
LYMPHOCYTES # BLD AUTO: 3.3 10E3/UL (ref 0.8–5.3)
LYMPHOCYTES NFR BLD AUTO: 45 %
MCH RBC QN AUTO: 29.9 PG (ref 26.5–33)
MCHC RBC AUTO-ENTMCNC: 34.9 G/DL (ref 31.5–36.5)
MCV RBC AUTO: 86 FL (ref 78–100)
MONOCYTES # BLD AUTO: 0.7 10E3/UL (ref 0–1.3)
MONOCYTES NFR BLD AUTO: 10 %
NEUTROPHILS # BLD AUTO: 3 10E3/UL (ref 1.6–8.3)
NEUTROPHILS NFR BLD AUTO: 42 %
NRBC # BLD AUTO: 0 10E3/UL
NRBC BLD AUTO-RTO: 0 /100
PLATELET # BLD AUTO: 268 10E3/UL (ref 150–450)
POTASSIUM SERPL-SCNC: 3.8 MMOL/L (ref 3.4–5.3)
RBC # BLD AUTO: 5.22 10E6/UL (ref 4.4–5.9)
SODIUM SERPL-SCNC: 136 MMOL/L (ref 136–145)
TROPONIN T SERPL HS-MCNC: 12 NG/L
WBC # BLD AUTO: 7.2 10E3/UL (ref 4–11)

## 2023-07-25 PROCEDURE — 80048 BASIC METABOLIC PNL TOTAL CA: CPT | Performed by: EMERGENCY MEDICINE

## 2023-07-25 PROCEDURE — 250N000013 HC RX MED GY IP 250 OP 250 PS 637: Performed by: STUDENT IN AN ORGANIZED HEALTH CARE EDUCATION/TRAINING PROGRAM

## 2023-07-25 PROCEDURE — 36415 COLL VENOUS BLD VENIPUNCTURE: CPT | Performed by: STUDENT IN AN ORGANIZED HEALTH CARE EDUCATION/TRAINING PROGRAM

## 2023-07-25 PROCEDURE — 84484 ASSAY OF TROPONIN QUANT: CPT | Performed by: EMERGENCY MEDICINE

## 2023-07-25 PROCEDURE — 85025 COMPLETE CBC W/AUTO DIFF WBC: CPT | Performed by: EMERGENCY MEDICINE

## 2023-07-25 PROCEDURE — 82310 ASSAY OF CALCIUM: CPT | Performed by: STUDENT IN AN ORGANIZED HEALTH CARE EDUCATION/TRAINING PROGRAM

## 2023-07-25 PROCEDURE — 93005 ELECTROCARDIOGRAM TRACING: CPT

## 2023-07-25 PROCEDURE — 82962 GLUCOSE BLOOD TEST: CPT

## 2023-07-25 PROCEDURE — 85025 COMPLETE CBC W/AUTO DIFF WBC: CPT | Performed by: STUDENT IN AN ORGANIZED HEALTH CARE EDUCATION/TRAINING PROGRAM

## 2023-07-25 PROCEDURE — 99285 EMERGENCY DEPT VISIT HI MDM: CPT | Mod: 25

## 2023-07-25 PROCEDURE — 71046 X-RAY EXAM CHEST 2 VIEWS: CPT

## 2023-07-25 RX ORDER — MECLIZINE HYDROCHLORIDE 25 MG/1
25 TABLET ORAL ONCE
Status: COMPLETED | OUTPATIENT
Start: 2023-07-25 | End: 2023-07-25

## 2023-07-25 RX ADMIN — MECLIZINE HYDROCHLORIDE 25 MG: 25 TABLET ORAL at 21:50

## 2023-07-25 ASSESSMENT — ACTIVITIES OF DAILY LIVING (ADL): ADLS_ACUITY_SCORE: 35

## 2023-07-25 NOTE — TELEPHONE ENCOUNTER
"Dr. Hernandes/ Providers-Please review and advise:  Patient wants to know if reported symptoms are likely related to starting Trulicity? (Writer does not find vertigo/dizziness listed under adverse effect of Trulicity, per review of [employer]rom[employer])  Patient would also like to know if he should be evaluated?  Patient would like to know if it would be safe for [place] to drive up [place]?    Call received from patient:  Leaving tonight to go up north-taking 5 teenagers up north  2. The last 2-3 days gets \"headspins\" and wonders if this is a side effect of Trulicity   A. Last dose was 7/22/23  3. Two days ago had 2 episodes which lasted a few hours each  4. Today vertigo happened again-when looked up room was spinning  5. Glucose was 179 and then increased to 234 today when checked during vertigo episode  6. Vertigo caused fall on couch two days ago, no LOC, no head/neck pain nor injury  7. This morning felt like he was almost going to fall over after waking up  8. Pain under left rib cage; infrequent about once per week after eating   A. Duration is about 1 minute  9. No difficulty breathing nor SOB  10. Afebrile   11. Diarrhea-minimal and intermittent  12. No emesis, hematuria, melena   13. No neck pain nor headache  14. Most recent vertigo episode was today at 1300, which has since resolved  15. Tingling in right hand-brief episode today, which is resolved  16. Dexcom is \"up and running\"    Informed patient high priority message will be sent to  PCP and covering providers, as PCP does not see patients on Tuesdays.  In the meantime, if develop vertigo and chest pain and/or difficulty breathing and/or sudden weakness/numbness/tingling, especially on one side of the body, call 911.    Patient verbalized understanding and in agreement with plan.    Thank you!.  QUE Tyler, RN-[facility]      Reason for Disposition   MODERATE dizziness (e.g., interferes with normal activities) (Exception: " dizziness caused by heat exposure, sudden standing, or poor fluid intake)    Additional Information   Negative: SEVERE difficulty breathing (e.g., struggling for each breath, speaks in single words)   Negative: Shock suspected (e.g., cold/pale/clammy skin, too weak to stand, low BP, rapid pulse)   Negative: Difficult to awaken or acting confused (e.g., disoriented, slurred speech)   Negative: Fainted, and still feels dizzy afterwards   Negative: Overdose (accidental or intentional) of medications   Negative: New neurologic deficit that is present now: * Weakness of the face, arm, or leg on one side of the body * Numbness of the face, arm, or leg on one side of the body * Loss of speech or garbled speech   Negative: Heart beating < 50 beats per minute OR > 140 beats per minute   Negative: Sounds like a life-threatening emergency to the triager   Negative: Chest pain   Negative: Rectal bleeding, bloody stool, or tarry-black stool   Negative: Vomiting is main symptom   Negative: Diarrhea is main symptom   Negative: Headache is main symptom   Negative: Heat exhaustion suspected (i.e., dehydration from heat exposure)   Negative: Patient states that they are having an anxiety or panic attack   Negative: Dizziness from low blood sugar (i.e., < 60 mg/dl or 3.5 mmol/l)   Negative: SEVERE dizziness (e.g., unable to stand, requires support to walk, feels like passing out now)   Negative: SEVERE headache or neck pain   Negative: Spinning or tilting sensation (vertigo) present now and one or more stroke risk factors (i.e., hypertension, diabetes mellitus, prior stroke/TIA, heart attack, age over 60) (Exception: prior physician evaluation for this AND no different/worse than usual)   Negative: Neurologic deficit that was brief (now gone), ANY of the following:* Weakness of the face, arm, or leg on one side of the body* Numbness of the face, arm, or leg on one side of the body* Loss of speech or garbled speech   Negative: Loss of  vision or double vision  (Exception: Similar to previous migraines.)   Negative: Difficulty breathing   Negative: Extra heart beats OR irregular heart beating (i.e., 'palpitations')   Negative: Drinking very little and has signs of dehydration (e.g., no urine > 12 hours, very dry mouth, very lightheaded)   Negative: Follows bleeding (e.g., stomach, rectum, vagina)  (Exception: Became dizzy from sight of small amount blood.)   Negative: Patient sounds very sick or weak to the triager   Negative: Lightheadedness (dizziness) present now, after 2 hours of rest and fluids   Negative: Spinning or tilting sensation (vertigo) present now   Negative: Fever > 103 F (39.4 C)   Negative: Fever > 100.0 F (37.8 C) and has diabetes mellitus or a weak immune system (e.g., HIV positive, cancer chemotherapy, organ transplant, splenectomy, chronic steroids)    Protocols used: Dizziness-A-OH

## 2023-07-25 NOTE — TELEPHONE ENCOUNTER
Patient Returning Call    Reason for call:  patient requesting a call back regarding new symptoms he is having with the new medication. Medications causing patient to have symptoms     Information relayed to patient:  N/A    Patient has additional questions:  No    What are your questions/concerns:  Yes     Could we [facility] or would you prefer to receive a phone call?:   Patient would prefer a phone call   Okay to leave a detailed message?: Yes at Cell number on file:    Telephone Information:   Mobile [phone]

## 2023-07-25 NOTE — TELEPHONE ENCOUNTER
I think should be evaluated before driving 5 teenagers up north.  Urgent care probably best bet.  Dr. Lesli Hernandes MD / [facility]

## 2023-07-26 ENCOUNTER — APPOINTMENT (OUTPATIENT)
Dept: MRI IMAGING | Facility: CLINIC | Age: 48
End: 2023-07-26
Attending: EMERGENCY MEDICINE
Payer: COMMERCIAL

## 2023-07-26 VITALS
OXYGEN SATURATION: 96 % | TEMPERATURE: 97.1 F | RESPIRATION RATE: 16 BRPM | WEIGHT: 275 LBS | DIASTOLIC BLOOD PRESSURE: 85 MMHG | BODY MASS INDEX: 40.73 KG/M2 | SYSTOLIC BLOOD PRESSURE: 135 MMHG | HEART RATE: 85 BPM | HEIGHT: 69 IN

## 2023-07-26 LAB
ATRIAL RATE - MUSE: 84 BPM
DIASTOLIC BLOOD PRESSURE - MUSE: NORMAL MMHG
INTERPRETATION ECG - MUSE: NORMAL
P AXIS - MUSE: 34 DEGREES
PR INTERVAL - MUSE: 144 MS
QRS DURATION - MUSE: 104 MS
QT - MUSE: 372 MS
QTC - MUSE: 439 MS
R AXIS - MUSE: 10 DEGREES
SYSTOLIC BLOOD PRESSURE - MUSE: NORMAL MMHG
T AXIS - MUSE: 39 DEGREES
VENTRICULAR RATE- MUSE: 84 BPM

## 2023-07-26 PROCEDURE — 255N000002 HC RX 255 OP 636: Performed by: EMERGENCY MEDICINE

## 2023-07-26 PROCEDURE — 70553 MRI BRAIN STEM W/O & W/DYE: CPT

## 2023-07-26 PROCEDURE — A9585 GADOBUTROL INJECTION: HCPCS | Performed by: EMERGENCY MEDICINE

## 2023-07-26 RX ORDER — MECLIZINE HYDROCHLORIDE 25 MG/1
25 TABLET ORAL 3 TIMES DAILY PRN
Qty: 20 TABLET | Refills: 0 | Status: SHIPPED | OUTPATIENT
Start: 2023-07-26 | End: 2023-08-02

## 2023-07-26 RX ORDER — GADOBUTROL 604.72 MG/ML
10 INJECTION INTRAVENOUS ONCE
Status: COMPLETED | OUTPATIENT
Start: 2023-07-26 | End: 2023-07-26

## 2023-07-26 RX ADMIN — GADOBUTROL 10 ML: 604.72 INJECTION INTRAVENOUS at 02:31

## 2023-07-26 ASSESSMENT — ACTIVITIES OF DAILY LIVING (ADL): ADLS_ACUITY_SCORE: 35

## 2023-07-26 NOTE — ED TRIAGE NOTES
"Pt presents with dizziness x2 days. Pt states he has new dx diabetes type 2. Pt seen at PCP last week for trulicity injection. Pt states nurse line call at 1300 informed him dizziness is not a side effect of this new medication and to present to ED. Pt denies vision changes, nausea, and headaches. Pt also c/o intermittent CP once a week x1 month; increased frequency. This CP is \"tight in the pectoral region\" and lasts a minute each incidence. Pt states PCP is \"working on the CP piece\".       Pt now also c/o diarrhea on 7/23.  "

## 2023-07-26 NOTE — ED PROVIDER NOTES
History     Chief Complaint:  Dizziness and Chest Pain       HPI   Gage Carter is a 48 year old male with a history of hypertension and type II diabetes mellitus who presents for evaluation of dizziness. Gage reports that since he started his Trulicity injection two weeks ago he's been experiencing some room spinning dizziness. He has checked his blood sugar during these episodes and has not been hypoglycemic. He notes that he has had similar dizziness in the past with his concussion, but notes that it had gone away until he started his injections. During evaluation, Gage states that his main concern is this dizziness as he is supposed to drive his [place] and friends [place] tomorrow and wants to make sure that he is safe driving. It has also affected his walking, as he states that he's had some recurring falls with no head trauma. However, Gage also reports some left upper chest pain that has been intermittently present for the past few years that also spreads underneath his left ribcage. He states that while this has been intermittently present for years, recently it has been interfering with his life as it occurs on average three times per week. He also reports some left back pain after eating, as well as increased fatigue that he couldn't correlate with high or low blood sugars. His mother does note that Gage has had some high blood sugars recently, measuring 259 earlier today. She also notes that Gage has had some increased wax in his ears recently and wasn't sure if this could contribute to his dizziness.     Independent Historian:   Mother supplements as above    Review of External Notes:   Reviewed clinic call from today    Medications:    Lisinopril-hydrochlorothiazide   Atorvastatin   Trulicity injection   Metformin     Past Medical History:    Depression   Hyperlipidemia   ARACELIS  MARGIE  PTSD  TBI  Type II diabetes mellitus   Hypertension   Immune deficiency disorder     Past Surgical History:   "  Hernia repair   Trigger thumb release   Colonoscopy  x2  Carpal tunnel release, bilateral   Shoulder arthroscopy   Lasik   Tonsillectomy      Physical Exam   Patient Vitals for the past 24 hrs:   BP Temp Temp src Pulse Resp SpO2 Height Weight   07/25/23 2130 (!) 137/90 97.1  F (36.2  C) Temporal 91 18 95 % 1.753 m (5' 9\") 124.7 kg (275 lb)        Physical Exam  General: Resting on the gurney, appears comfortable  Head:  The scalp, face, and head appear normal  Mouth/Throat: Mucus membranes are moist  CV:  Regular rate    Normal S1 and S2  No pathological murmur   Resp:  Breath sounds clear and equal bilaterally    Non-labored, no retractions or accessory muscle use    No coarseness    No wheezing   GI:  Abdomen is soft, no rigidity    No tenderness to palpation  MS:  Normal motor assessment of all extremities.    Good capillary refill noted.  Skin:  No rash or lesions noted.  Neuro:   Speech is normal and fluent. No apparent deficit.    Slightly unsteady gait, strength and sensation intact x4    Cranial nerves II-XII intact  Psych: Awake. Alert.  Normal affect.      Appropriate interactions.    Emergency Department Course   ECG  ECG taken at 2140, ECG read at 0100  Normal sinus rhythm   Incomplete right bundle branch block  Nonspecific T wave abnormality  Abnormal ECG   Rate 84 bpm. CA interval 144 ms. QRS duration 104 ms. QT/QTc 372/439 ms. P-R-T axes 34 10 39.     Imaging:  MR Brain w/o & w Contrast   Final Result   IMPRESSION:   1.  No evidence of an acute intracranial abnormality.      XR Chest 2 Views   Final Result   IMPRESSION: Mild left basilar atelectasis. No focal airspace consolidation. No pleural effusion or pneumothorax.      Upper limits of normal heart size.         Report per radiology    Laboratory:  Labs Ordered and Resulted from Time of ED Arrival to Time of ED Departure   BASIC METABOLIC PANEL - Abnormal       Result Value    Sodium 136      Potassium 3.8      Chloride 99      Carbon Dioxide " (CO2) 22      Anion Gap 15      Urea Nitrogen 18.3      Creatinine 0.98      Calcium 9.7      Glucose 174 (*)     GFR Estimate >90     GLUCOSE BY METER - Abnormal    GLUCOSE BY METER POCT 157 (*)    TROPONIN T, HIGH SENSITIVITY - Normal    Troponin T, High Sensitivity 12     CBC WITH PLATELETS AND DIFFERENTIAL    WBC Count 7.2      RBC Count 5.22      Hemoglobin 15.6      Hematocrit 44.7      MCV 86      MCH 29.9      MCHC 34.9      RDW 12.2      Platelet Count 268      % Neutrophils 42      % Lymphocytes 45      % Monocytes 10      % Eosinophils 2      % Basophils 1      % Immature Granulocytes 0      NRBCs per 100 WBC 0      Absolute Neutrophils 3.0      Absolute Lymphocytes 3.3      Absolute Monocytes 0.7      Absolute Eosinophils 0.2      Absolute Basophils 0.0      Absolute Immature Granulocytes 0.0      Absolute NRBCs 0.0          Procedures   none    Emergency Department Course & Assessments:    Interventions:  Medications   meclizine (ANTIVERT) tablet 25 mg (25 mg Oral $Given 7/25/23 2150)   gadobutrol (GADAVIST) injection 10 mL (10 mLs Intravenous $Given 7/26/23 0231)   sodium chloride (PF) 0.9% PF flush 10 mL (10 mLs Intravenous $Given 7/26/23 0231)        Assessments:  2358 I obtained history and examined the patient as noted above.   0248 I rechecked and updated the patient. At this time, the patient was deemed safe to discharge home and he agreed to the plan of care.    Independent Interpretation (X-rays, CTs, rhythm strip):  No ICH on CT.  No ptx or pna on cxr.    Consultations/Discussion of Management or Tests:  None        Social Determinants of Health affecting care:   Stress/Adjustment Disorders    Disposition:  The patient was discharged to home.     Impression & Plan    CMS Diagnoses: None    Medical Decision Making:  Gage Carter is a 48 year old male who presents for evaluation of dizziness with the most likely etiology being vertigo. The differential diagnosis of vertigo is broad and  includes common etiologies such as menieres disease, labyrinthitis, benign positional vertigo, otitis media, etc.  More serious etiologies considered include central etiologies such as tumor, intracerebral bleed, dissection, ischemic cerebral vascular accident.  The history, physical exam including detailed neurologic exam, and workup in the emergency room suggests a benign cause of vertigo today. A brain MRI was obtained due to the longevity of the patient's symptoms and history of a serious concussion which ruled out more serious etiologies of his vertigo and showed no acute intracranial pathology. Patient feels improved after interventions noted above. Further outpatient management is indicated with vertigo medications.  I have instructed him not to drive until his symptoms have fully resolved nad are no longer recurring. He has a physical therapist who helped him with balance after his TBI, who he will follow up with.  He is in stable condition at the time of discharge, indications for return to the ED were discussed as well as follow up. All questions were answered and he is in agreement with the plan.      Diagnosis:    ICD-10-CM    1. Vertigo  R42            Discharge Medications:  New Prescriptions    MECLIZINE (ANTIVERT) 25 MG TABLET    Take 1 tablet (25 mg) by mouth 3 times daily as needed for dizziness      Scribe Disclosure:  I, Yusra Caraballo, am serving as a scribe at 12:00 AM on 7/26/2023 to document services personally performed by Rossy Christianson MD based on my observations and the provider's statements to me.        Rossy Christianson MD  07/26/23 0328

## 2023-08-02 ENCOUNTER — OFFICE VISIT (OUTPATIENT)
Dept: FAMILY MEDICINE | Facility: CLINIC | Age: 48
End: 2023-08-02
Payer: COMMERCIAL

## 2023-08-02 VITALS
DIASTOLIC BLOOD PRESSURE: 78 MMHG | TEMPERATURE: 97.6 F | WEIGHT: 276.7 LBS | HEART RATE: 78 BPM | OXYGEN SATURATION: 95 % | SYSTOLIC BLOOD PRESSURE: 132 MMHG | RESPIRATION RATE: 18 BRPM | BODY MASS INDEX: 40.98 KG/M2 | HEIGHT: 69 IN

## 2023-08-02 DIAGNOSIS — E11.9 TYPE 2 DIABETES MELLITUS WITHOUT COMPLICATION, WITHOUT LONG-TERM CURRENT USE OF INSULIN (H): ICD-10-CM

## 2023-08-02 DIAGNOSIS — H81.10 BENIGN PAROXYSMAL POSITIONAL VERTIGO, UNSPECIFIED LATERALITY: ICD-10-CM

## 2023-08-02 DIAGNOSIS — G47.33 OSA (OBSTRUCTIVE SLEEP APNEA): ICD-10-CM

## 2023-08-02 DIAGNOSIS — H26.9 CATARACT OF RIGHT EYE, UNSPECIFIED CATARACT TYPE: ICD-10-CM

## 2023-08-02 DIAGNOSIS — E78.2 MIXED HYPERLIPIDEMIA: ICD-10-CM

## 2023-08-02 DIAGNOSIS — Z01.818 PREOP GENERAL PHYSICAL EXAM: ICD-10-CM

## 2023-08-02 DIAGNOSIS — I10 ESSENTIAL HYPERTENSION: ICD-10-CM

## 2023-08-02 PROCEDURE — 99214 OFFICE O/P EST MOD 30 MIN: CPT | Performed by: INTERNAL MEDICINE

## 2023-08-02 RX ORDER — MECLIZINE HYDROCHLORIDE 25 MG/1
25 TABLET ORAL 3 TIMES DAILY PRN
Qty: 30 TABLET | Refills: 1 | Status: SHIPPED | OUTPATIENT
Start: 2023-08-02 | End: 2024-02-23

## 2023-08-02 RX ORDER — LISINOPRIL AND HYDROCHLOROTHIAZIDE 20; 25 MG/1; MG/1
1 TABLET ORAL AT BEDTIME
Qty: 90 TABLET | Refills: 3 | Status: SHIPPED | OUTPATIENT
Start: 2023-08-02 | End: 2024-02-25 | Stop reason: SINTOL

## 2023-08-02 RX ORDER — METFORMIN HCL 500 MG
2000 TABLET, EXTENDED RELEASE 24 HR ORAL EVERY EVENING
Qty: 90 TABLET | Refills: 3 | Status: SHIPPED | OUTPATIENT
Start: 2023-08-02 | End: 2023-12-20

## 2023-08-02 ASSESSMENT — PAIN SCALES - GENERAL: PAINLEVEL: NO PAIN (0)

## 2023-08-02 NOTE — PATIENT INSTRUCTIONS
For informational purposes only. Not to replace the advice of your health care provider. Copyright   2003,  [facility]. All rights reserved. Clinically reviewed by Sun Mcelroy MD. [employer] [id number] - REV .  Preparing for Your Surgery  Getting started  A nurse will call you to review your health history and instructions. They will give you an arrival time based on your scheduled surgery time. Please be ready to share:  Your doctor's clinic name and phone number  Your medical, surgical, and anesthesia history  A list of allergies and sensitivities  A list of medicines, including herbal treatments and over-the-counter drugs  Whether the patient has a legal guardian (ask how to send us the papers in advance)  Please tell us if you're pregnant--or if there's any chance you might be pregnant. Some surgeries may injure a fetus (unborn baby), so they require a pregnancy test. Surgeries that are safe for a fetus don't always need a test, and you can choose whether to have one.   If you have a child who's having surgery, please ask for a copy of Preparing for Your Child's Surgery.    Preparing for surgery  Within 10 to 30 days of surgery: Have a pre-op exam (sometimes called an H&P, or History and Physical). This can be done at a clinic or pre-operative center.  If you're having a , you may not need this exam. Talk to your care team.  At your pre-op exam, talk to your care team about all medicines you take. If you need to stop any medicines before surgery, ask when to start taking them again.  We do this for your safety. Many medicines can make you bleed too much during surgery. Some change how well surgery (anesthesia) drugs work.  Call your insurance company to let them know you're having surgery. (If you don't have insurance, call [phone].)  Call your clinic if there's any change in your health. This includes signs of a cold or flu (sore throat, runny nose, cough, rash, fever). It also  includes a scrape or scratch near the surgery site.  If you have questions on the day of surgery, call your hospital or surgery center.  Eating and drinking guidelines  For your safety: Unless your surgeon tells you otherwise, follow the guidelines below.  Eat and drink as usual until 8 hours before you arrive for surgery. After that, no food or milk.  Drink clear liquids until 2 hours before you arrive. These are liquids you can see through, like water, Gatorade, and Propel Water. They also include plain black coffee and tea (no cream or milk), candy, and breath mints. You can spit out gum when you arrive.  If you drink alcohol: Stop drinking it the night before surgery.  If your care team tells you to take medicine on the morning of surgery, it's okay to take it with a sip of water.  Preventing infection  Shower or bathe the night before and morning of your surgery. Follow the instructions your clinic gave you. (If no instructions, use regular soap.)  Don't shave or clip hair near your surgery site. We'll remove the hair if needed.  Don't smoke or vape the morning of surgery. You may chew nicotine gum up to 2 hours before surgery. A nicotine patch is okay.  Note: Some surgeries require you to completely quit smoking and nicotine. Check with your surgeon.  Your care team will make every effort to keep you safe from infection. We will:  Clean our hands often with soap and water (or an alcohol-based hand rub).  Clean the skin at your surgery site with a special soap that kills germs.  Give you a special gown to keep you warm. (Cold raises the risk of infection.)  Wear special hair covers, masks, gowns and gloves during surgery.  Give antibiotic medicine, if prescribed. Not all surgeries need antibiotics.  What to bring on the day of surgery  Photo ID and insurance card  Copy of your health care directive, if you have one  Glasses and hearing aids (bring cases)  You can't wear contacts during surgery  Inhaler and eye  drops, if you use them (tell us about these when you arrive)  CPAP machine or breathing device, if you use them  A few personal items, if spending the night  If you have . . .  A pacemaker, ICD (cardiac defibrillator) or other implant: Bring the ID card.  An implanted stimulator: Bring the remote control.  A legal guardian: Bring a copy of the certified (court-stamped) guardianship papers.  Please remove any jewelry, including body piercings. Leave jewelry and other valuables at home.  If you're going home the day of surgery  You must have a responsible adult drive you home. They should stay with you overnight as well.  If you don't have someone to stay with you, and you aren't safe to go home alone, we may keep you overnight. Insurance often won't pay for this.  After surgery  If it's hard to control your pain or you need more pain medicine, please call your surgeon's office.  Questions?   If you have any questions for your care team, list them here: _________________________________________________________________________________________________________________________________________________________________________ ____________________________________ ____________________________________ ____________________________________    How to Take Your Medication Before Surgery  - Take all of your medications before surgery except as noted below  - HOLD (do not take) your HYDROCHLOROTHIAZIDE on the morning of surgery.   - HOLD (do not take) your METFORMIN on the morning of surgery.  - Do not take your Trulicity injection on 8/5/23 (ok to resume after surgery)    Instructions About Your Continuous Glucose Monitor  You should be prepared to remove the Continuous Glucose Monitor prior to the operation in order to avoid damage to the equipment during the procedure. Your Continuous Glucose Monitor will not be the source of glucose monitoring during the operation.
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
## 2023-08-07 ENCOUNTER — OFFICE VISIT (OUTPATIENT)
Dept: PHARMACY | Facility: CLINIC | Age: 48
End: 2023-08-07
Attending: FAMILY MEDICINE

## 2023-08-07 VITALS
BODY MASS INDEX: 40.43 KG/M2 | DIASTOLIC BLOOD PRESSURE: 76 MMHG | WEIGHT: 273.9 LBS | HEART RATE: 73 BPM | SYSTOLIC BLOOD PRESSURE: 132 MMHG | OXYGEN SATURATION: 94 %

## 2023-08-07 DIAGNOSIS — E78.2 MIXED HYPERLIPIDEMIA: ICD-10-CM

## 2023-08-07 DIAGNOSIS — I10 ESSENTIAL HYPERTENSION: ICD-10-CM

## 2023-08-07 DIAGNOSIS — E11.9 TYPE 2 DIABETES MELLITUS WITHOUT COMPLICATION, WITHOUT LONG-TERM CURRENT USE OF INSULIN (H): Primary | ICD-10-CM

## 2023-08-07 PROCEDURE — 99607 MTMS BY PHARM ADDL 15 MIN: CPT | Performed by: PHARMACIST

## 2023-08-07 PROCEDURE — 99605 MTMS BY PHARM NP 15 MIN: CPT | Performed by: PHARMACIST

## 2023-08-07 RX ORDER — ACYCLOVIR 400 MG/1
1 TABLET ORAL
Qty: 3 EACH | Refills: 5 | Status: SHIPPED | OUTPATIENT
Start: 2023-08-07 | End: 2023-09-20

## 2023-08-07 RX ORDER — ACYCLOVIR 400 MG/1
1 TABLET ORAL
Qty: 3 EACH | Refills: 5 | Status: SHIPPED | OUTPATIENT
Start: 2023-08-07 | End: 2023-08-07
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
## 2023-08-07 NOTE — PATIENT INSTRUCTIONS
"Recommendations from today's MTM visit:                                                    MTM (medication therapy management) is a service provided by a clinical pharmacist designed to help you get the most of out of your medicines.   Today we reviewed what your medicines are for, how to know if they are working, that your medicines are safe and how to make your medicine regimen as easy as possible.      1. FYI--change your lisinopril-hydrochlorothiazide pill to morning due to diuretic portion of pill will you urinate more overnight when taken late in the day .    2. Thanks for starting the dexcom G7 sensor today(I sent rx to [facility] for you)  --and giving me remote blood sugar access.  Change diet when you see food that spike your blood sugars >180mg/dl.     Follow your wife's intermittent fasting lifestyle --I strive for 2 main meals/day in an 8-12 hour window , NO snacking at all !  Walk 30 minutes /day for exercise.     3. Your A1c is 9.1% --stay on metformin and trulicity (skip this week due to cataract surgery) , start Jardiance after cataract surgery --it is a 10mg. Tablet --take in morning --stay well hydrated all day long with water!        Follow-up: Return in about 30 days (around 9/6/2023), or @ 2:30 PM via telephone, for Blood sugar meter review, Medication Therapy Management Visit.    It was great speaking with you today.  I value your experience and would be very thankful for your time in providing feedback in our clinic survey. In the next few days, you may receive an email or text message from [employer] with a link to a survey related to your  clinical pharmacist.\"     To schedule another MTM appointment, please call the clinic directly or you may call the MTM scheduling line at [phone] or toll-free at [phone].     My Clinical Pharmacist's contact information:                                                      Please feel free to contact me with any questions or concerns you have.    "   Any Delacruz [facility].  Medication Therapy Management Provider  [phone]

## 2023-08-07 NOTE — Clinical Note
Lesli--thx for mtm referral --I change keving to dexcom G7 and added jardiance --I think hell respond nicely --motivated n ow to get better .  Thmerissa-Any

## 2023-08-19 ENCOUNTER — HEALTH MAINTENANCE LETTER (OUTPATIENT)
Age: 48
End: 2023-08-19

## 2023-08-21 ENCOUNTER — MYC MEDICAL ADVICE (OUTPATIENT)
Dept: FAMILY MEDICINE | Facility: CLINIC | Age: 48
End: 2023-08-21
Payer: COMMERCIAL

## 2023-08-21 DIAGNOSIS — M79.672 PAIN IN BOTH FEET: ICD-10-CM

## 2023-08-21 DIAGNOSIS — E11.9 TYPE 2 DIABETES MELLITUS WITHOUT COMPLICATION, WITHOUT LONG-TERM CURRENT USE OF INSULIN (H): Primary | ICD-10-CM

## 2023-08-21 DIAGNOSIS — M79.671 PAIN IN BOTH FEET: ICD-10-CM
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
## 2023-09-06 ENCOUNTER — VIRTUAL VISIT (OUTPATIENT)
Dept: PHARMACY | Facility: CLINIC | Age: 48
End: 2023-09-06

## 2023-09-06 DIAGNOSIS — E11.9 TYPE 2 DIABETES MELLITUS WITHOUT COMPLICATION, WITHOUT LONG-TERM CURRENT USE OF INSULIN (H): Primary | ICD-10-CM

## 2023-09-06 DIAGNOSIS — I10 ESSENTIAL HYPERTENSION: ICD-10-CM

## 2023-09-06 DIAGNOSIS — E78.2 MIXED HYPERLIPIDEMIA: ICD-10-CM

## 2023-09-06 PROCEDURE — 99606 MTMS BY PHARM EST 15 MIN: CPT | Performed by: PHARMACIST

## 2023-09-06 NOTE — Clinical Note
Rosette--nenita--guido progressing nicely on dm +lifestyle plan -- will continue as is -see me in November for lab rechecks to gauge progress.  Felix Pagan

## 2023-09-06 NOTE — PATIENT INSTRUCTIONS
"Recommendations from today's MTM visit:                                                         1.  Congrats on great lifestyle/meds changes -- Home weight down to 260lbs. --your doing great --continue intermittent fasting /watch carbohydrates in diet , eat  2 x day but socially with staff and family, then fast when just alone. Your wife has bought you some Glucerna protein shakes --ok to drink one/day in your \"fasting \" window to stay full or drink after wt.lifting once your eye doctor clears you for lifting post cataract surgery .    Refill Trulicity 1.5mg./week dose at your pharmacy as soon as they can refill it , Jardiance as well, stay on Metformin 2 x day as well.   Restart a new dex-com G7 cgm sensor for continuous blood sugar monitoring --you should be able to place sensor yourself --make an L- shape with your arm and insert new sensor underneath arm (not on the side) and use clear overlay patch to keep sensor on. Change every 10 days .     Remember --drink more water /day to stay well hydrated.    2.  I placed 4 future lab orders --make a lab only appt. The week before we meet and fast for 12 hours (no food but drink plenty of water) , we will recheck A1c, Cholesterol, Urine albumin, CMP-metabolic panel.      Follow-up: Return in about 2 months (around 11/16/2023), or @ 3 PM, for Medication Therapy Management Visit, Lab Work, Weight loss, Blood sugar meter review, A1c lab.    It was great speaking with you today.  I value your experience and would be very thankful for your time in providing feedback in our clinic survey. In the next few days, you may receive an email or text message from [employer] with a link to a survey related to your  clinical pharmacist.\"     To schedule another MTM appointment, please call the clinic directly or you may call the MTM scheduling line at [phone] or toll-free at [phone].     My Clinical Pharmacist's contact information:                                          "             Please feel free to contact me with any questions or concerns you have.      Any Delacruz Rph.  Medication Therapy Management Provider  [phone]

## 2023-09-14 ENCOUNTER — OFFICE VISIT (OUTPATIENT)
Dept: PODIATRY | Facility: CLINIC | Age: 48
End: 2023-09-14
Payer: COMMERCIAL

## 2023-09-14 VITALS
SYSTOLIC BLOOD PRESSURE: 128 MMHG | DIASTOLIC BLOOD PRESSURE: 74 MMHG | BODY MASS INDEX: 40.43 KG/M2 | WEIGHT: 273 LBS | HEIGHT: 69 IN

## 2023-09-14 DIAGNOSIS — M21.41 PES PLANUS OF BOTH FEET: ICD-10-CM

## 2023-09-14 DIAGNOSIS — R23.4 SKIN FISSURE: ICD-10-CM

## 2023-09-14 DIAGNOSIS — M21.42 PES PLANUS OF BOTH FEET: ICD-10-CM

## 2023-09-14 DIAGNOSIS — L85.3 DRY SKIN: ICD-10-CM

## 2023-09-14 DIAGNOSIS — E11.00 TYPE 2 DIABETES MELLITUS WITH HYPEROSMOLARITY WITHOUT COMA, WITHOUT LONG-TERM CURRENT USE OF INSULIN (H): Primary | ICD-10-CM

## 2023-09-14 PROCEDURE — 99203 OFFICE O/P NEW LOW 30 MIN: CPT | Performed by: PODIATRIST

## 2023-09-14 RX ORDER — AMMONIUM LACTATE 12 G/100G
CREAM TOPICAL 2 TIMES DAILY
Qty: 385 G | Refills: 3 | Status: SHIPPED | OUTPATIENT
Start: 2023-09-14

## 2023-09-14 ASSESSMENT — PAIN SCALES - GENERAL: PAINLEVEL: NO PAIN (0)

## 2023-09-14 NOTE — PATIENT INSTRUCTIONS
Thank you for choosing [facility] Podiatry / Foot & Ankle Surgery!    DR. ROSALES'S CLINIC LOCATIONS:     [facility] TRIAGE LINE: [phone]   [address] APPOINTMENTS: [phone]   [place] [address] RADIOLOGY: [phone]   (Every other Tues - Wed - Fri PM) SET UP SURGERY: [phone]    PHYSICAL THERAPY: [phone]   [place] SPECIALTY BILLING QUESTIONS: [phone]   [address] FAX: [phone]   [place] [address]    (Thurs & Fri AM)      Foot fissures:  Apply a foot cream to the dry skin and file it down with a pumice stone.  Painful lesions can be cleansed and then sealed with a skin glue, i.e. New Skin.   Avoidance of excessive soaking of feet is recommended.     Amlactin 12% cream is prescribed.  You can use any over-the-counter foot cream as well.  Avoid applying between toes.    A referral for custom orthoses is provided.  Locations listed below.    [place] [facility] LOCATIONS  [facility]  [address]  [place] [address]  Phone: [phone]  Fax: [phone] [facility]   [address]  [place] [address]  Phone: [phone]  Fax: [phone]   [facility]  [address]  [place] [address]  Phone: [phone]  Fax: [phone] [facility]  [address]  [place] [address]  Phone: [phone]   Fax: [phone]   * Please call any location listed to make an appointment for a casting/fitting. Your referral was sent to their central office and they will all have the order on file.     Here is a list of routine foot care resources, which includes toenail trimming and callus/corn management.     This is not a referral. It is your responsibility to contact the organization and your insurance to confirm cost and coverage.      ROUTINE FOOT CARE (NAIL TRIMMING / CALLUSES)      [facility]  [phone]   [facility]  [phone]  Multiple locations   [facility]  [phone] Dr. Amaya and Dr. Cody  [address] [place] [address]  [phone]     DIABETES AND YOUR FEET  Diabetes can result in several problems in the feet including ulcers (open sores) and amputations. Two of the most important reasons why people develop foot problems when they have diabetes is : 1. Neuropathy (loss of feeling)  2. Vascular disease (loss or decrease of blood flow).    Neuropathy is a term used to describe a loss of nerve function.  Patients with diabetes are at risk of developing neuropathy if their sugars continue to run high and are above the normal value. One theory for neuropathy is that the \"extra\" sugar in the body enters the nerves and is broken down. These by-products build up in the nerve causing it to swell and impairing nerve function. Often times, this can be prevented by controlling your sugars, dieting and exercise.    When a person develops neuropathy, they usually begin to feel numbness or tingling in their feet and sometime in their legs.  Other symptoms may include painful burning or hot feet, tingling or feeling like insects or ants are crawling on your feet or legs.  If the diabetes is sever and the sugars run high for long periods of time, neuropathy can also occur in the hands.    Vascular disease  is a term used to describe a loss or decrease in circulation (blood flow). There is a problem in getting blood and oxygen to areas that need it. Similar to neuropathy, sugars can build up in the walls of the arteries (blood vessels) and cause them to become swollen, thickened and hardened. This decreases the amount of blood that can go to an area that needs it. Though this is common in the legs of diabetic patients, it can also affect other arteries (blood vessels) in the body such as in the heart and eyes.    In the legs, vascular disease usually results in cramping. " Patients who develop leg cramps after walking the same distance every time (i.e. One block, half a mile, ect.) need to let their doctors know so that their circulation may be checked. Cramps causing severe pain in the feet and/or legs while sleeping and the cramps go away when you stand or hang your legs off the side of the bed, may also be a sign of poor blood circulation.  Occasional cramping in cold weather or on rare occasions with activity may not be due to poor circulation, but you should inform your doctor.    PREVENTION OF THESE DISEASES  The key to prevention is good blood sugar control. Poor blood sugar control is a big reason many of these problems start. Physical activity (exercise) is a very good way to help decrease your blood sugars. Exercise can lower your blood sugar, blood pressure, and cholesterol. It also reduces your risk for heart disease and stroke, relieves stress, and strengthens your heart, muscles and bones.  In addition, regular activity helps insulin work better, improves your blood circulation, and keeps your joints flexible. If you're trying to lose weight, a combination of exercise and wise food choices can help you reach your target weight and maintain it.      PAIN MANAGEMENT (**Please speak with your primary doctor about any medications**)  1.Blood Sugar Control - Most important  2. Medications such as:  Amytriptylline, duloxetine, gabapentin, lyrica, tramadol (talk with your primary care doctor about this).     NUTRITION:  Nutrition is also important to help with healing. If your body does not have what it needs, it can't heal.   Increasing your protein intake is important.  With wounds you need 60-90gm of protein a day to help with healing. Over the counter protein shakes such as Rob, Glucerna, Ensure, ect... can help to supplement your daily protein intake.   It is also important to take Vitamins to help with healing.  Vitamins such as B12, B6 and Vitamin D3 are important for  "healing. These can be gotten over the counter at pharmacies or at stores like [employer] or the [employer].    I can also prescribe a dietary supplement called \"Rheumate\" that has a lot of essential vitamins in one capsule.  This may not be covered by insurance though.     FOOT CARE RECOMMENDATIONS   1. Wash your feet with lukewarm water and a mild soap and then dry them thoroughly, especially between the toes.     2. Examine your feet daily looking for cuts, corns, blisters, cracks, ect, especially after wearing new shoes. Make sure to look between your toes. If you cannot see the bottom of your feet, set a mirror on the floor and hold your foot over it, or ask a spouse, friend or family member to examine your feet for you. Contact your doctor immediately if new problems are noted or if sores are not healing.     3. Immediately apply moisturizer to the tops and bottoms of your feet, avoiding areas between the toes. Hand lotion (Intesive Care, Honey, Eucerin, Neutrogena, Curel, ect) is sufficient unless your doctor prescribes a medicated lotion. Apply sunscreen to your feet when going swimming outside.     4. Use clean comfortable shoes, wear white socks (if you have any bleeding or drainage, you will see it on white socks). Socks should not have thick seams or cut off the circulation around the leg. Break in new shoes slowly and rotate with older shoes until broken in. Check the inside of your shoes with your hand to look for areas of irritation or objects that may have fallen into your shoes.       5. Keep slippers by the side of your bed for use during the night.     6.  Shoes should be fitted by a professional and should not cause areas of irritation.  Check your feet regularly when wearing a new pair of shoes and replace them as needed.     7.  Talk to your doctor about proper exercise. Exercise and stretching stimulate blood flow to your feet and maintain proper glucose levels.     8.  Monitor your blood glucose " level as instructed by your doctor. Notify your doctor immediately if your blood sugar is abnormally high or low.    9. Cut your nails straight across, but then gently round any sharp edges with a cardboard nail file. If you have neuropathy, peripheral vascular disease or cannot see that well to trim your own toenails contact [facility] ([phone]) or [facility] ([phone]).      THINGS TO AVOID DOING   1.  Do not soak your feet if you have an open sore. Use only lukewarm water and always check the temperature with your hand as hot water can easily burn your feet.       2.  Never use a hot water bottle or heating pad on your feet. Also do not apply cold compresses to your feet. With decreased sensation, you could burn or freeze your feet.       3.  Do not apply any of these to your feet:    -  Over the counter medicine for corns or warts    -  Harsh chemicals like boric acid    -  Do not self-treat corns, cuts, blisters or infections. Always consult your doctor.       4.  Do not wear sandals, slippers or walk barefoot, especially on hot sand or concrete or other harsh surfaces.     5.  If you smoke, stop!!!
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
## 2023-09-17 ENCOUNTER — TELEPHONE (OUTPATIENT)
Dept: PHARMACY | Facility: CLINIC | Age: 48
End: 2023-09-17
Payer: COMMERCIAL

## 2023-09-17 DIAGNOSIS — E11.9 TYPE 2 DIABETES MELLITUS WITHOUT COMPLICATION, WITHOUT LONG-TERM CURRENT USE OF INSULIN (H): ICD-10-CM

## 2023-09-18 RX ORDER — ACYCLOVIR 400 MG/1
1 TABLET ORAL
Qty: 3 EACH | Refills: 5 | OUTPATIENT
Start: 2023-09-18

## 2023-09-18 NOTE — TELEPHONE ENCOUNTER
Please start prior authorization for Continuous Blood Gluc Sensor (DEXCOM G7 SENSOR) [facility].    Thank you!  JARROD TylerN, RN-BC  [facility]

## 2023-09-20 ENCOUNTER — MYC MEDICAL ADVICE (OUTPATIENT)
Dept: PHARMACY | Facility: CLINIC | Age: 48
End: 2023-09-20
Payer: COMMERCIAL

## 2023-09-20 DIAGNOSIS — E11.9 TYPE 2 DIABETES MELLITUS WITHOUT COMPLICATION, WITHOUT LONG-TERM CURRENT USE OF INSULIN (H): Primary | ICD-10-CM

## 2023-09-20 RX ORDER — BLOOD SUGAR DIAGNOSTIC
STRIP MISCELLANEOUS
Qty: 100 STRIP | Refills: 11 | Status: SHIPPED | OUTPATIENT
Start: 2023-09-20

## 2023-09-20 RX ORDER — BLOOD-GLUCOSE METER
1 EACH MISCELLANEOUS ONCE
Qty: 1 KIT | Refills: 0 | Status: SHIPPED | OUTPATIENT
Start: 2023-09-20 | End: 2023-09-20

## 2023-09-20 RX ORDER — LANCETS
EACH MISCELLANEOUS
Qty: 100 EACH | Refills: 11 | Status: SHIPPED | OUTPATIENT
Start: 2023-09-20

## 2023-09-20 NOTE — TELEPHONE ENCOUNTER
Called patient and informed him prior authorization for DEXCOM G7 was denied because of a plan exclusion.    Patient verbalized understanding and stated he would message/notify BEKA Delacruz RPh, with [facility].    Writer offered to update BEKA Delacruz RPh and patient declined.    JARROD TylerN, RN-BC  [facility]

## 2023-09-20 NOTE — TELEPHONE ENCOUNTER
PRIOR AUTHORIZATION DENIED    Medication: DEXCOM G7 SENSOR MISC  Insurance Company: [employer] - Phone [phone] Fax [phone]  Denial Date: 9/20/2023  Denial Rational:     Appeal Information: None Available Plan Exclusion    Patient Notified: Yes

## 2023-09-21 NOTE — TELEPHONE ENCOUNTER
9-21-23      Thanks.  I ll keep monitoring myself.  I lift weights 20 minutes a night (hernia is back for me so I keep it manageable) and I m at 255 right now.  Thanks for managing my meds and making the recommendation to move them to morning.  I m getting good sleep.  Feeling rested and not up so often at night feeling like my bladder is full.  Appreciate you and so does my son (he has seen more energy out of me)       Mtm appreciates kind words.    Ej

## 2023-09-21 NOTE — TELEPHONE ENCOUNTER
I told my wife they wouldn t cover it.  A rep from your clinic said  my plan  excludes it.  To me and my wife that means [facility] is okay as long as a patient picks the correct plan.  The rep/caller from your clinic thought the two had synonymous meaning.  In any case I can t pay the 500 so need to go back to finger pricking and test strips.  Can you put in an order for that or do I buy that over the counter?  Thanks Aguila.  I felt talked down to by your colleague whomever the rep was by the way.  I do have a Masters degree.  I may have a poor health track record but I can comprehend info.  Gage Bone very sorry cgm not affordable --will send in rx for accu-check guide meter + supplies. Will also apologize on behalf of who called him ?    Any Delacruz [facility].  Medication Therapy Management Provider  [phone]

## 2023-10-02 ENCOUNTER — MYC MEDICAL ADVICE (OUTPATIENT)
Dept: PHARMACY | Facility: CLINIC | Age: 48
End: 2023-10-02
Payer: COMMERCIAL

## 2023-10-03 NOTE — TELEPHONE ENCOUNTER
10-3-23     was 151 this morning.  I ll check twice a day so I don t run out of test strips anytime soon.  I ll go in for labs this Friday (fasting before)       Gage Carter  You2 hours ago (7:06 AM)       Read message   Thank unit(s)    Any Delacruz [facility].  Medication Therapy Management Provider  [phone]

## 2023-10-03 NOTE — TELEPHONE ENCOUNTER
I m using the finger pricking model again. Maintaining intermittent fasting. Only real change is stress about my son s upcoming surgery this Monday. Over the weekend two of my toes at the tips were extremely purple and I had no energy felt like I had to sleep constantly. I checked myself Sunday night (didn t have my case with me at the cabin) and I was at 289. I took the trulicity injection. And went to bed. Checked myself in morning and I was 136. Toes are still purple but not as prevalent. Just in the nail bed. Open enrollment at work so I m signing up for a medical plan offering a HSA so I can put aside money to purchase the DexcomG7 device. I don t think once a day finger pricking will help me manage my glucose levels.     Mtm notes above :  Reviewed his meds- he is on jardiance concern is for circulation issues --will have him stop jardiance asap. See if normal color returns to toes.    Any Delacruz [facility].  Medication Therapy Management Provider  [phone]

## 2023-10-06 ENCOUNTER — LAB (OUTPATIENT)
Dept: LAB | Facility: CLINIC | Age: 48
End: 2023-10-06
Attending: INTERNAL MEDICINE
Payer: COMMERCIAL

## 2023-10-06 DIAGNOSIS — E11.9 TYPE 2 DIABETES MELLITUS WITHOUT COMPLICATION, WITHOUT LONG-TERM CURRENT USE OF INSULIN (H): ICD-10-CM

## 2023-10-06 DIAGNOSIS — E78.2 MIXED HYPERLIPIDEMIA: ICD-10-CM

## 2023-10-06 LAB
ALBUMIN SERPL BCG-MCNC: 4.6 G/DL (ref 3.5–5.2)
ALP SERPL-CCNC: 61 U/L (ref 40–129)
ALT SERPL W P-5'-P-CCNC: 68 U/L (ref 0–70)
ANION GAP SERPL CALCULATED.3IONS-SCNC: 10 MMOL/L (ref 7–15)
AST SERPL W P-5'-P-CCNC: 45 U/L (ref 0–45)
BILIRUB SERPL-MCNC: 0.7 MG/DL
BUN SERPL-MCNC: 11 MG/DL (ref 6–20)
CALCIUM SERPL-MCNC: 9.3 MG/DL (ref 8.6–10)
CHLORIDE SERPL-SCNC: 109 MMOL/L (ref 98–107)
CHOLEST SERPL-MCNC: 108 MG/DL
CREAT SERPL-MCNC: 0.98 MG/DL (ref 0.67–1.17)
CREAT UR-MCNC: 406 MG/DL
DEPRECATED HCO3 PLAS-SCNC: 23 MMOL/L (ref 22–29)
EGFRCR SERPLBLD CKD-EPI 2021: >90 ML/MIN/1.73M2
GLUCOSE SERPL-MCNC: 93 MG/DL (ref 70–99)
HBA1C MFR BLD: 6.1 % (ref 0–5.6)
HDLC SERPL-MCNC: 25 MG/DL
LDLC SERPL CALC-MCNC: 48 MG/DL
MICROALBUMIN UR-MCNC: 46.8 MG/L
MICROALBUMIN/CREAT UR: 11.53 MG/G CR (ref 0–17)
NONHDLC SERPL-MCNC: 83 MG/DL
POTASSIUM SERPL-SCNC: 4.1 MMOL/L (ref 3.4–5.3)
PROT SERPL-MCNC: 7.3 G/DL (ref 6.4–8.3)
SODIUM SERPL-SCNC: 142 MMOL/L (ref 135–145)
TRIGL SERPL-MCNC: 173 MG/DL

## 2023-10-06 PROCEDURE — 80061 LIPID PANEL: CPT

## 2023-10-06 PROCEDURE — 82043 UR ALBUMIN QUANTITATIVE: CPT

## 2023-10-06 PROCEDURE — 83036 HEMOGLOBIN GLYCOSYLATED A1C: CPT

## 2023-10-06 PROCEDURE — 82570 ASSAY OF URINE CREATININE: CPT

## 2023-10-06 PROCEDURE — 36415 COLL VENOUS BLD VENIPUNCTURE: CPT

## 2023-10-06 PROCEDURE — 80053 COMPREHEN METABOLIC PANEL: CPT

## 2023-10-16 ENCOUNTER — HOSPITAL ENCOUNTER (EMERGENCY)
Facility: CLINIC | Age: 48
Discharge: HOME | End: 2023-10-16
Attending: EMERGENCY MEDICINE | Admitting: EMERGENCY MEDICINE
Payer: COMMERCIAL

## 2023-10-16 ENCOUNTER — NURSE TRIAGE (OUTPATIENT)
Dept: FAMILY MEDICINE | Facility: CLINIC | Age: 48
End: 2023-10-16
Payer: COMMERCIAL

## 2023-10-16 ENCOUNTER — APPOINTMENT (OUTPATIENT)
Dept: CT IMAGING | Facility: CLINIC | Age: 48
End: 2023-10-16
Attending: EMERGENCY MEDICINE
Payer: COMMERCIAL

## 2023-10-16 VITALS
TEMPERATURE: 98.3 F | HEART RATE: 79 BPM | HEIGHT: 68 IN | SYSTOLIC BLOOD PRESSURE: 126 MMHG | RESPIRATION RATE: 16 BRPM | DIASTOLIC BLOOD PRESSURE: 78 MMHG | WEIGHT: 250 LBS | OXYGEN SATURATION: 98 % | BODY MASS INDEX: 37.89 KG/M2

## 2023-10-16 DIAGNOSIS — S09.90XA CLOSED HEAD INJURY, INITIAL ENCOUNTER: ICD-10-CM

## 2023-10-16 DIAGNOSIS — S00.83XA FACIAL CONTUSION, INITIAL ENCOUNTER: ICD-10-CM

## 2023-10-16 PROCEDURE — 70450 CT HEAD/BRAIN W/O DYE: CPT

## 2023-10-16 PROCEDURE — 99284 EMERGENCY DEPT VISIT MOD MDM: CPT | Mod: 25

## 2023-10-16 PROCEDURE — 70486 CT MAXILLOFACIAL W/O DYE: CPT

## 2023-10-16 ASSESSMENT — ACTIVITIES OF DAILY LIVING (ADL): ADLS_ACUITY_SCORE: 35

## 2023-10-16 NOTE — TELEPHONE ENCOUNTER
Reason for Disposition   Knocked out (unconscious) < 1 minute and now fine   ACUTE NEUROLOGIC SYMPTOM and now fine   Dangerous injury (e.g., MVA, diving, trampoline, contact sports, fall > 10 feet or 3 meters) or severe blow from hard object (e.g., golf club or baseball bat)   Sounds like a serious injury to the triager    Additional Information   Negative: Knocked out (unconscious) > 1 minute   Negative: Seizure (convulsion) occurred  (Exception: Prior history of seizures and now alert and without Acute Neurologic Symptoms.)   Negative: Neck pain after dangerous injury (e.g., MVA, diving, trampoline, contact sports, fall > 10 feet or 3 meters)  (Exception: Neck pain began > 1 hour after injury.)   Negative: Major bleeding (actively dripping or spurting) that can't be stopped   Negative: Penetrating head injury (e.g., knife, gunshot wound, metal object)   Negative: Sounds like a life-threatening emergency to the triager   Negative: Can't remember what happened (amnesia)   Negative: ACUTE NEUROLOGIC SYMPTOM and symptom present now   Negative: Diagnosed with a concussion within last 14 days   Negative: Vomiting once or more   Negative: Watery or blood-tinged fluid dripping from the nose or ears   Negative: SEVERE headache   Negative: Large swelling or bruise (> 2 inches or 5 cm)   Negative: Skin is split open or gaping (length > 1/2 inch or 12 mm)   Negative: Bleeding won't stop after 10 minutes of direct pressure (using correct technique)   Negative: One or two 'black eyes' (bruising, purple color of eyelids), and onset within 24 hours of head injury   Negative: Taking Coumadin (warfarin) or other strong blood thinner, or known bleeding disorder (e.g., thrombocytopenia)   Negative: Age over 65 years with an area of head swelling or bruise    Protocols used: Head Injury-A-OH
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
## 2023-11-14 NOTE — PROGRESS NOTES
Medication Therapy Management (MTM) Encounter    ASSESSMENT:                            Medication Adherence/Access: No issues identified    Type 2 Diabetes:   Patient is  meeting A1c goal of < 7%. Self monitoring of blood glucose is at goal of fasting  mg/dL and post prandial < 180 mg/dL. Patient is meeting average glucose goal of <150mg/dL.    Metformin : stay on current daily dose (2grams/day), GLP-1 agonist :Trulicity 1.5mg./week  stay on the same dose., SGLT2 inhibitor (Jardiance) :  Stay at dose : 10mg./day , Increased exercise, and weight loss recommended--continue low carb + intermittent fasting diet.     Hyperlipidemia:   Stable.  Patient is on high intensity statin which is indicated based on 2019 ACC/AHA guidelines for lipid management.      Hypertension:   Stable. Patient is meeting blood pressure goal of < 140/90mmHg. Patient would benefit from the following changes - continued blood pressure monitoring, watching diet, increasing exercise and weight loss, and limiting/reducing sodium.      Post concussion syndrome:  To better control outburst especially thur holidays --he agreed to trial low dose selective serotonin reuptake inhibitor--sertraline --he was on the drug 10 years ago for depression and it helped.       PLAN:                              1. Lets trial a low dose Sertraline 50mg tablet to help with outburst from post concussion syndrome , we did try this drug 10 years ago for depression issues --lets trial it again --ok to even cut in half start with 1/2 tab /day .    2. Make appt. To see Dr. Nolan in next 6 weeks , remove 1-17-24 Dr. Hernandes appt.     3. Blood sugars still looking quite good --read the healthy living with diabetes booklet I gave you today to help with dietary choices , if needed you can see Ansley here at the clinic the dietician.         Follow-up: Return in about 12 weeks (around 2/8/2024), or @ 3 PM, for Medication Therapy Management Visit, A1c lab, Blood sugar  meter review, BP Recheck.      SUBJECTIVE/OBJECTIVE:                          Gage Carter is a 48 year old male coming in for a follow-up (9-6-23) visit. He was referred to me from Dr. Lesli Hernandes. Now seeing new pcp -Idania Nolan    .    Patient is agreeable to private pay visits -mtm in 2023.      Reason for visit:  Dm f/up , had cataract surgery rt. Eye.   Post concussion anger issues still bugging him...      Allergies/ADRs: Reviewed in chart  Past Medical History: strong family hx of elevated Trigs , Per patient new type-2 dm spring 2022. Vertigo issues with alcohol , orbital fracture from rock hit him , TBI, concussion . 2018.   Cataract surgery week of August 7th -2023.   Tobacco: He reports that he has never smoked. He has never used smokeless tobacco.  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  Activity:  left knee issue--wearing brace - can only do 12 minutes treadmill before feeling pain , torn meniscus     Medication Adherence/Access: no issues reported    Type 2 Diabetes:    Trulicity 1.5mg./week - doing well but missed one injection when he forgot to take cap off.   Metformin 500mg ER --2 tabs twice daily   Jardiance 10mg./day in am.   Not taking aspirin due to age  Patient is not experiencing side effects.  Blood sugar monitoring: dexcom G7 too$$ back to fingersticks:accuchek guide meter.       7 days =164 n=4  14 days = 163 n=10  30 days = n=17    Date FBG/ 2hours post Lunch/2hours post Dinner /2hours post    11-15-23   1786 1106pm    11-13-23 140 10;27am      11-12-23   188 10pm    11-11   153 11;28pm    11-9 162 8;23am      11-8  136 noon 180 10;15pm    11-6 153 8:19am          Current diabetes symptoms: polydipsia  Diet/Exercise:   Started intermittent fasting --working well.   He admits needs to drink more  water /day.   Admits home weight 260lbs.   Will start wt.lifting after eye doctor ok's this.   Wife bought him glucerna proteins shakes--mtm notes ok to drink one/day .       Previously:   stress eater-eating wrong stuff --dad had alzehimers --stressful to help him , cookies, pizza binging.  He did slim genics in the past helped him control carbs.   Bfast - cottage cheese /crackers, hard boiled egg wheat toast , apple with pbutter , water. ,loves milk   Mid am snacks =not really , granola bar 2 x week  Lunch - tuna and celery wrap, leftovers-grilled burger , trending off pasta, beef mehran no bun , salmon tilapia, likes black bean soup , tomato basil.   Mid afternoon snacks --rare grab and go fruits -grapes , popsicle, chicken broth   Dinner= lots of chicken meals, walleye, cod fish, salads. Baby carrots.   Late night snacks= striving not to eat post 8 pm, likes smoothies- lots of fruits , strawberry, mangos, bananas  Eye exam: cataract surgery 8-14-23. Was successfully!  Foot exam: due  Urine Albumin:   Lab Results   Component Value Date    UMALCR 11.53 10/06/2023      Lab Results   Component Value Date    A1C 6.1 10/06/2023    A1C 9.1 07/12/2023    A1C 5.1 03/05/2012         Hyperlipidemia:   Atorvastatin 40mg daily (new dosage last month July 2023).   Patient reports no significant myalgias or other side effects.  The ASCVD Risk score (Maitland DK, et al., 2019) failed to calculate for the following reasons:    The valid total cholesterol range is 130 to 320 mg/dL    Recent Labs   Lab Test 10/06/23  1440 07/12/23  1349   CHOL 108 139   HDL 25* 22*   LDL 48 38   TRIG 173* 678*         Hypertension:   Lisinopril -hydrochlorothiazide 20-25--was mistakenly told to take at night --but has nocturia. Now taking it in am.   Patient reports the following medication side effects:nocturia from bedtime dosing.   Patient does not self-monitor blood pressure.    BP Readings from Last 3 Encounters:   11/16/23 102/68   10/16/23  126/78   09/14/23 128/74     Pulse Readings from Last 3 Encounters:   11/16/23 84   10/16/23 79   08/07/23 73       Post concussion syndrome:   Gage called his wife nate --stating he is having outbursts -not able to control emotions after recent accident /concussion --they feel he needs something over the holidays to be able to enjoy himself and keep family happy.   10 years ago he was on sertraline for depression and thought a low dose re-trial today would be a good idea.       /68   Pulse 84   Wt 259 lb 9.6 oz (117.8 kg)   SpO2 94%   BMI 39.47 kg/m      --------------------------------------------------------------------------------------------------------------      I spent 15 minutes with this patient today. All changes were made via collaborative practice agreement with Idania Nolan   A copy of the visit note was provided to the patient's provider(s).    A summary of these recommendations was given to patient at end of todays visit.     Any Delacruz [facility].  Medication Therapy Management Provider  [phone]         Medication Therapy Recommendations  Concussion without loss of consciousness, sequela (H24)    Current Medication: sertraline (ZOLOFT) 50 MG tablet   Rationale: Untreated condition - Needs additional medication therapy - Indication   Recommendation: Start Medication   Status: Accepted per CPA

## 2023-11-16 ENCOUNTER — OFFICE VISIT (OUTPATIENT)
Dept: PHARMACY | Facility: CLINIC | Age: 48
End: 2023-11-16
Payer: COMMERCIAL

## 2023-11-16 ENCOUNTER — APPOINTMENT (OUTPATIENT)
Dept: LAB | Facility: CLINIC | Age: 48
End: 2023-11-16
Payer: COMMERCIAL

## 2023-11-16 VITALS
OXYGEN SATURATION: 94 % | BODY MASS INDEX: 39.47 KG/M2 | DIASTOLIC BLOOD PRESSURE: 68 MMHG | SYSTOLIC BLOOD PRESSURE: 102 MMHG | WEIGHT: 259.6 LBS | HEART RATE: 84 BPM

## 2023-11-16 DIAGNOSIS — I10 ESSENTIAL HYPERTENSION: ICD-10-CM

## 2023-11-16 DIAGNOSIS — E11.9 TYPE 2 DIABETES MELLITUS WITHOUT COMPLICATION, WITHOUT LONG-TERM CURRENT USE OF INSULIN (H): Primary | ICD-10-CM

## 2023-11-16 DIAGNOSIS — F43.10 POSTTRAUMATIC STRESS DISORDER: ICD-10-CM

## 2023-11-16 DIAGNOSIS — E78.2 MIXED HYPERLIPIDEMIA: ICD-10-CM

## 2023-11-16 DIAGNOSIS — S06.0X0S CONCUSSION WITHOUT LOSS OF CONSCIOUSNESS, SEQUELA (H): ICD-10-CM

## 2023-11-16 PROCEDURE — 99606 MTMS BY PHARM EST 15 MIN: CPT | Performed by: PHARMACIST

## 2023-11-16 NOTE — Clinical Note
Rosette -- saw guido today dm and bp wnl, spoke on phioen with wife nate --he is acknowledging outburts since concussion and would like something to calm him for holidays --10 years he took zoloft for depression --we are retrying a low dose today --he will f/up with you in 4-6 weeks .  Felix Pagan

## 2023-11-16 NOTE — PATIENT INSTRUCTIONS
"Recommendations from today's MTM visit:                                                         1. Lets trial a low dose Sertraline 50mg tablet to help with outburst from post concussion syndrome , we did try this drug 10 years ago for depression issues --lets trial it again --ok to even cut in half start with 1/2 tab /day .    2. Make appt. To see Dr. Nolan in next 6 weeks , remove 1-17-24 Dr. Hernandes appt.     3. Blood sugars still looking quite good --read the healthy living with diabetes booklet I gave you today to help with dietary choices , if needed you can see Ansley here at the clinic the dietician.         Follow-up: Return in about 12 weeks (around 2/8/2024), or @ 3 PM, for Medication Therapy Management Visit, A1c lab, Blood sugar meter review, BP Recheck.    It was great speaking with you today.  I value your experience and would be very thankful for your time in providing feedback in our clinic survey. In the next few days, you may receive an email or text message from "[employer]" with a link to a survey related to your  clinical pharmacist.\"     To schedule another MTM appointment, please call the clinic directly or you may call the MTM scheduling line at [phone] or toll-free at [phone].     My Clinical Pharmacist's contact information:                                                      Please feel free to contact me with any questions or concerns you have.      Any Delacruz [facility].  Medication Therapy Management Provider  [phone]    "

## 2023-12-20 ENCOUNTER — TELEPHONE (OUTPATIENT)
Dept: FAMILY MEDICINE | Facility: CLINIC | Age: 48
End: 2023-12-20
Payer: COMMERCIAL

## 2023-12-20 DIAGNOSIS — E11.9 TYPE 2 DIABETES MELLITUS WITHOUT COMPLICATION, WITHOUT LONG-TERM CURRENT USE OF INSULIN (H): ICD-10-CM

## 2023-12-20 RX ORDER — METFORMIN HCL 500 MG
2000 TABLET, EXTENDED RELEASE 24 HR ORAL EVERY EVENING
Qty: 360 TABLET | Refills: 0 | Status: SHIPPED | OUTPATIENT
Start: 2023-12-20 | End: 2024-02-08

## 2023-12-20 NOTE — TELEPHONE ENCOUNTER
General Call    Contacts         Type Contact Phone/Fax    12/20/2023 03:47 PM CST Phone (Incoming) Gage Carter (Self) [phone] (H)          Reason for Call:  Update to care team    What are your questions or concerns:  Saw MTM. Pays out of pocket and it is too expensive.   Has been unable to get Trulicity for past few weeks. It is backordered and he has been advised this is a popular medication for weight loss.   Patient asked if care team could make a note for prescription noting the importance of him having requested medication as he uses it for diabetes medication NOT weight loss. Advised that writer could pass message along, but it would most likely not hold weight with pharmacy staff to ensure he is receiving his medication over others.   Asked if patient would be interested in an alternative medication-no he does not.   Patient was frustrated about wait times and that writer was advising what she was advising. Writer asked if patient wanted follow up to come via telephone or MyC patient went into great detail regarding his barriers with MyC etc and that he simply needed writer to pass along message. Writer advised that message was being sent and she was trying to see how patient wanted follow up. Patient said he just wanted message sent so his team was aware of his barriers. Wished patient well and ended phone call.

## 2023-12-21 NOTE — TELEPHONE ENCOUNTER
Called and talked to patient about medication. Supposedly [facility] will get a shipment in today. Patient's mom will check pharmacy around noon to see if supply came in. If not patient will call back to request trulicity script be sent to the [facility] per Any Delacruz's advise.

## 2023-12-21 NOTE — TELEPHONE ENCOUNTER
Patient would like to have new script for trulicity sent to mail order pharmacy since [facility] did not have medication come in shipment today.      Mtm sent in 3 months rx for trulicity 1.5mg to  [facility] and notifed patient via myc.    Any Delacruz [facility].  Medication Therapy Management Provider  [phone]     Normal

## 2024-02-02 DIAGNOSIS — E11.9 TYPE 2 DIABETES MELLITUS WITHOUT COMPLICATION, WITHOUT LONG-TERM CURRENT USE OF INSULIN (H): ICD-10-CM

## 2024-02-04 NOTE — PROGRESS NOTES
Medication Therapy Management (MTM) Encounter    ASSESSMENT:                            Medication Adherence/Access: No issues identified    Type 2 Diabetes:   Patient is  meeting A1c goal of < 7%. Self monitoring of blood glucose is at goal of fasting  mg/dL and post prandial < 180 mg/dL. Patient is meeting average glucose goal of <150mg/dL. More regular blood glucose testing could benefit patient, but overall current numbers are all in goal ranges. Metformin : stay on current daily dose (2grams/day), GLP-1 agonist :Trulicity 1.5mg./week  stay on the same dose., SGLT2 inhibitor (Jardiance) :  Stay at dose : 10mg./day , Increased exercise, and weight loss recommended--continue low carb + intermittent fasting diet. Intermittently tingling in both arms unlikely diabetic neuropathy and will continue to monitor.    Hyperlipidemia:   Stable.  Patient is on high intensity statin which is indicated based on 2019 ACC/AHA guidelines for lipid management.      Hypertension:   Stable. Patient is meeting blood pressure goal of < 140/90mmHg. Patient's blood pressure is fairly low in clinic today, no immediate action needed, but continue monitoring in future to ensure his dose of lisinopril/hydrochlorothiazide is not too much for him.    Post concussion syndrome:  Tapered off sertraline after [place], no withdrawal or continued symptoms.     PLAN:                            1. Need to increase sleep; minimum 7 hours per night. Good goal is 7-9 hours per night.    2. Congrats A1c today 5.8%, outstanding! Continue all current medications as is. We can revisit Trulicity dosing and Dexcom G7 in June.    Follow-up: Return in about 4 months (around 6/18/2024), or @2PM, for BP Recheck, Medication Therapy Management Visit, A1c lab, Blood sugar meter review.    SUBJECTIVE/OBJECTIVE:                          Gage Carter is a 48 year old male coming in for a follow-up (11-16-23) visit. He was referred to me from Dr. Lesli Hernandes.  Now seeing new pcp -Idania Nolan.     Reason for visit:  Dm f/up.    Allergies/ADRs: Reviewed in chart  Past Medical History: strong family hx of elevated Trigs , Per patient new type-2 dm spring 2022. Vertigo issues with alcohol , orbital fracture from rock hit him , TBI, concussion . 2018.   Cataract surgery week of August 7th -2023.   Tobacco: He reports that he has never smoked. He has never used smokeless tobacco.  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  Activity:  left knee issue--wearing brace - can only do 12 minutes treadmill before feeling pain , torn meniscus     Medication Adherence/Access: no issues reported    Type 2 Diabetes:    Trulicity 1.5mg./week - doing well but missed one injection when he forgot to take cap off.   Metformin 500mg ER --2 tabs twice daily   Jardiance 10mg./day in am.   Not taking aspirin due to age  Patient is not experiencing side effects.  Blood sugar monitoring: Fingersticks:accuchek guide meter. (Provided a sample G7 today that patient can try later if desired).    Patient is hoping to return to 2x daily testing.  Began noticing infrequent tingling in both arms (intermittently January/February).  Had about a 10 week gap since last appointment in Trulicity use due to pharmacy inventory limitations restarted last Sunday without any symptoms since.    7 days = 122 n = 7  14 days =  124 n = 14  30 days = 138 n = 21  90 days = 148 n 33    Date FBG/ 2hours post Lunch/2hours post Dinner /2hours post    2-8 117      2-7   122 122   2-6 137  108 126   2-5 125      1-27    135   1-26    127   1-25 140         PREVIOUSLY:  7 days =164 n=4  14 days = 163 n=10  30 days = n=17    Date FBG/ 2hours post Lunch/2hours post Dinner /2hours post    11-15-23   1786 1106pm    11-13-23 140 10;27am      11-12-23    188 10pm    11-11   153 11;28pm    11-9 162 8;23am      11-8  136 noon 180 10;15pm    11-6 153 8:19am          Current diabetes symptoms: polydipsia  Diet/Exercise:   Started intermittent fasting --working well.   Wt.lifting with son going well.   Wife bought him glucerna proteins shakes--mtm notes ok to drink one/day .     Previously:   stress eater-eating wrong stuff --dad had alzehimers --stressful to help him , cookies, pizza binging.  He did slim genics in the past helped him control carbs.   Bfast - cottage cheese /crackers, hard boiled egg wheat toast , apple with pbutter , water. ,loves milk   Mid am snacks =not really , granola bar 2 x week  Lunch - tuna and celery wrap, leftovers-grilled burger , trending off pasta, beef mehran no bun , salmon tilapia, likes black bean soup , tomato basil.   Mid afternoon snacks --rare grab and go fruits -grapes , popsicle, chicken broth   Dinner= lots of chicken meals, walleye, cod fish, salads. Baby carrots.   Late night snacks= striving not to eat post 8 pm, likes smoothies- lots of fruits , strawberry, mangos, bananas  Eye exam: cataract surgery 8-14-23. Was successfully!  Foot exam: due  Urine Albumin:   Lab Results   Component Value Date    UMALCR 11.53 10/06/2023      Lab Results   Component Value Date    A1C 5.8 02/08/2024    A1C 6.1 10/06/2023    A1C 9.1 07/12/2023    A1C 5.1 03/05/2012           Hyperlipidemia:   Atorvastatin 40mg daily (new dosage last month July 2023).   Patient reports no significant myalgias or other side effects.  The ASCVD Risk score (Chung DK, et al., 2019) failed to calculate for the following reasons:    The valid total cholesterol range is 130 to 320 mg/dL    Recent Labs   Lab Test 10/06/23  1440 07/12/23  1349   CHOL 108 139   HDL 25* 22*   LDL 48 38   TRIG 173* 678*     Hypertension:   Lisinopril -hydrochlorothiazide 20-25--was mistakenly told to take at night --but has nocturia. Now taking it in am.   Patient reports the following  medication side effects:nocturia from bedtime dosing.   Patient does not self-monitor blood pressure.    BP Readings from Last 3 Encounters:   02/08/24 101/68   11/16/23 102/68   10/16/23 126/78     Pulse Readings from Last 3 Encounters:   02/08/24 88   11/16/23 84   10/16/23 79     Post concussion syndrome:  Stopped Sertraline 50mg./day in December. Was temporary following head injuries, patient tapered off and has no side effects.    Wt Readings from Last 5 Encounters:   02/08/24 254 lb 12.8 oz (115.6 kg)   11/16/23 259 lb 9.6 oz (117.8 kg)   10/16/23 250 lb (113.4 kg)   09/14/23 273 lb (123.8 kg)   08/07/23 273 lb 14.4 oz (124.2 kg)        /68   Pulse 88   Wt 254 lb 12.8 oz (115.6 kg)   SpO2 96%   BMI 38.74 kg/m      --------------------------------------------------------------------------------------------------------------      I spent 30 minutes with this patient today. All changes were made via collaborative practice agreement with Idania Nolan   A copy of the visit note was provided to the patient's provider(s).    A summary of these recommendations was given to patient at end of todays visit.     Any Delacruz [facility].  Medication Therapy Management Provider  [phone]       Medication Therapy Recommendations  No medication therapy recommendations to display

## 2024-02-08 ENCOUNTER — LAB (OUTPATIENT)
Dept: LAB | Facility: CLINIC | Age: 49
End: 2024-02-08
Payer: COMMERCIAL

## 2024-02-08 ENCOUNTER — OFFICE VISIT (OUTPATIENT)
Dept: PHARMACY | Facility: CLINIC | Age: 49
End: 2024-02-08
Payer: COMMERCIAL

## 2024-02-08 VITALS
SYSTOLIC BLOOD PRESSURE: 101 MMHG | OXYGEN SATURATION: 96 % | DIASTOLIC BLOOD PRESSURE: 68 MMHG | WEIGHT: 254.8 LBS | BODY MASS INDEX: 38.74 KG/M2 | HEART RATE: 88 BPM

## 2024-02-08 DIAGNOSIS — E11.9 TYPE 2 DIABETES MELLITUS WITHOUT COMPLICATION, WITHOUT LONG-TERM CURRENT USE OF INSULIN (H): Primary | ICD-10-CM

## 2024-02-08 DIAGNOSIS — E11.9 TYPE 2 DIABETES MELLITUS WITHOUT COMPLICATION, WITHOUT LONG-TERM CURRENT USE OF INSULIN (H): ICD-10-CM

## 2024-02-08 DIAGNOSIS — S06.0X0S CONCUSSION WITHOUT LOSS OF CONSCIOUSNESS, SEQUELA (H): ICD-10-CM

## 2024-02-08 DIAGNOSIS — E78.2 MIXED HYPERLIPIDEMIA: ICD-10-CM

## 2024-02-08 DIAGNOSIS — I10 ESSENTIAL HYPERTENSION: ICD-10-CM

## 2024-02-08 LAB — HBA1C MFR BLD: 5.8 % (ref 0–5.6)

## 2024-02-08 PROCEDURE — 36415 COLL VENOUS BLD VENIPUNCTURE: CPT

## 2024-02-08 PROCEDURE — 99605 MTMS BY PHARM NP 15 MIN: CPT | Performed by: PHARMACIST

## 2024-02-08 PROCEDURE — 83036 HEMOGLOBIN GLYCOSYLATED A1C: CPT

## 2024-02-08 PROCEDURE — 99607 MTMS BY PHARM ADDL 15 MIN: CPT | Performed by: PHARMACIST

## 2024-02-08 RX ORDER — METFORMIN HCL 500 MG
1000 TABLET, EXTENDED RELEASE 24 HR ORAL 2 TIMES DAILY WITH MEALS
COMMUNITY
Start: 2024-02-08 | End: 2024-05-07

## 2024-02-08 NOTE — Clinical Note
Chely--we saw guido today happy to report A1c now 5.8%--bp low normal, all looking good--I plan to recheck him in June lipids/A1c, he will se you later this month as well.  Georg[place]/ave

## 2024-02-08 NOTE — PATIENT INSTRUCTIONS
"Recommendations from today's MTM visit:                                                       1. Need to increase sleep; minimum 7 hours per night. Good goal is 7-9 hours per night.    2. Congrats A1c today 5.8%, outstanding! Continue all current medications as is. We can revisit Trulicity dosing and Dexcom G7 in June.    Follow-up: Return in about 4 months (around 6/18/2024), or @2PM, for BP Recheck, Medication Therapy Management Visit, A1c lab, Blood sugar meter review.    It was great speaking with you today.  I value your experience and would be very thankful for your time in providing feedback in our clinic survey. In the next few days, you may receive an email or text message from [employer] with a link to a survey related to your  clinical pharmacist.\"     To schedule another MTM appointment, please call the clinic directly or you may call the MTM scheduling line at [phone] or toll-free at [phone].     My Clinical Pharmacist's contact information:                                                      Please feel free to contact me with any questions or concerns you have.      Any Delacruz [facility].  Medication Therapy Management Provider  [phone]     Bony Vo  Pharm-[employer] student      "

## 2024-02-23 ENCOUNTER — MYC MEDICAL ADVICE (OUTPATIENT)
Dept: FAMILY MEDICINE | Facility: CLINIC | Age: 49
End: 2024-02-23

## 2024-02-23 ENCOUNTER — OFFICE VISIT (OUTPATIENT)
Dept: FAMILY MEDICINE | Facility: CLINIC | Age: 49
End: 2024-02-23
Payer: COMMERCIAL

## 2024-02-23 VITALS
RESPIRATION RATE: 18 BRPM | HEART RATE: 85 BPM | OXYGEN SATURATION: 97 % | TEMPERATURE: 98 F | SYSTOLIC BLOOD PRESSURE: 102 MMHG | WEIGHT: 252.4 LBS | BODY MASS INDEX: 37.38 KG/M2 | DIASTOLIC BLOOD PRESSURE: 68 MMHG | HEIGHT: 69 IN

## 2024-02-23 DIAGNOSIS — I10 ESSENTIAL HYPERTENSION: Primary | ICD-10-CM

## 2024-02-23 DIAGNOSIS — B35.3 TINEA PEDIS OF BOTH FEET: ICD-10-CM

## 2024-02-23 DIAGNOSIS — E11.9 TYPE 2 DIABETES MELLITUS WITHOUT COMPLICATION, WITHOUT LONG-TERM CURRENT USE OF INSULIN (H): Primary | ICD-10-CM

## 2024-02-23 DIAGNOSIS — I10 ESSENTIAL HYPERTENSION: ICD-10-CM

## 2024-02-23 DIAGNOSIS — B35.1 TOENAIL FUNGUS: ICD-10-CM

## 2024-02-23 DIAGNOSIS — H61.23 BILATERAL IMPACTED CERUMEN: ICD-10-CM

## 2024-02-23 DIAGNOSIS — D80.2 IGA DEFICIENCY (H): ICD-10-CM

## 2024-02-23 DIAGNOSIS — E78.2 MIXED HYPERLIPIDEMIA: ICD-10-CM

## 2024-02-23 PROCEDURE — 90750 HZV VACC RECOMBINANT IM: CPT | Performed by: INTERNAL MEDICINE

## 2024-02-23 PROCEDURE — 99215 OFFICE O/P EST HI 40 MIN: CPT | Mod: 25 | Performed by: INTERNAL MEDICINE

## 2024-02-23 PROCEDURE — 90472 IMMUNIZATION ADMIN EACH ADD: CPT | Performed by: INTERNAL MEDICINE

## 2024-02-23 PROCEDURE — 82784 ASSAY IGA/IGD/IGG/IGM EACH: CPT | Performed by: INTERNAL MEDICINE

## 2024-02-23 PROCEDURE — 90677 PCV20 VACCINE IM: CPT | Performed by: INTERNAL MEDICINE

## 2024-02-23 PROCEDURE — 69209 REMOVE IMPACTED EAR WAX UNI: CPT | Mod: 50 | Performed by: INTERNAL MEDICINE

## 2024-02-23 PROCEDURE — 36415 COLL VENOUS BLD VENIPUNCTURE: CPT | Performed by: INTERNAL MEDICINE

## 2024-02-23 PROCEDURE — 99417 PROLNG OP E/M EACH 15 MIN: CPT | Performed by: INTERNAL MEDICINE

## 2024-02-23 PROCEDURE — 90471 IMMUNIZATION ADMIN: CPT | Performed by: INTERNAL MEDICINE

## 2024-02-23 RX ORDER — CICLOPIROX 80 MG/ML
SOLUTION TOPICAL
Qty: 6.6 ML | Refills: 11 | Status: SHIPPED | OUTPATIENT
Start: 2024-02-23

## 2024-02-23 ASSESSMENT — PATIENT HEALTH QUESTIONNAIRE - PHQ9
SUM OF ALL RESPONSES TO PHQ QUESTIONS 1-9: 2
10. IF YOU CHECKED OFF ANY PROBLEMS, HOW DIFFICULT HAVE THESE PROBLEMS MADE IT FOR YOU TO DO YOUR WORK, TAKE CARE OF THINGS AT HOME, OR GET ALONG WITH OTHER PEOPLE: SOMEWHAT DIFFICULT
SUM OF ALL RESPONSES TO PHQ QUESTIONS 1-9: 2

## 2024-02-23 ASSESSMENT — PAIN SCALES - GENERAL: PAINLEVEL: NO PAIN (0)

## 2024-02-23 NOTE — PATIENT INSTRUCTIONS
- Trial dialogue journal with daughter to help preserve your sleep    - I'm going to check with Any on clarification on CGM but will also place a referral to diabetes educator    - I recommend Athlete's foot powder twice daily for 2 weeks to both feet    - I also recommend trying the Penlac nail lacquer to help with toenail fungus    - I recommend buying mineral oil over the counter and putting 3-4 drops in each ear at nighttime 3-4 times per month (put a washcloth on your pillow in case ear wax drips onto your pillow), and then the next morning, let the shower water run into your ears and rinse out the wax.

## 2024-02-23 NOTE — PROGRESS NOTES
Post Ear Irrigation exam completed after MA ear wash and cerumen plug removed, tympanic membrane intact, and no bleeding noted. Pain assessment completed.     Patient tolerated procedure:  yes     Gricel Abad RN  [facility]

## 2024-02-23 NOTE — PROGRESS NOTES
"  Assessment & Plan     (E11.9) Type 2 diabetes mellitus without complication, without long-term current use of insulin (H)  (primary encounter diagnosis)  Comment: Dx 2023; Very well controlled, last A1C 5.8 Feb 2024.  On Trulicty, Jardiance, metformin.  MTM pharmacist managing.  Would like to try CGM.  Plan: Adult Diabetes Education  Referral    (I10) Essential hypertension  Comment: Has been on lisinopril-hctz 20-25; last three clinic visits BP was 100/60's- sent MyC message to see if can check BP's at home.  May be able to DC hctz    (B35.3) Tinea pedis of both feet  Comment: Cracking between toes; erythema.  Miconazole powder BID x14 days.  Plan: miconazole (MICATIN) 2 % external powder    (B35.1) Toenail fungus  Comment: Try penlac lacquer.  Plan: ciclopirox (PENLAC) 8 % external solution    (H61.23) Bilateral impacted cerumen  Comment: Ear irrigation bilaterally today; has had some difficulty with hearing- suspect wax related.  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  Plan: IgA    (E78.2) Mixed hyperlipidemia  Comment: On high intensity statin, LDL goal <70; meeting.         I spent a total of 63 minutes on the day of the visit.   Time spent by me doing chart review, history and exam, documentation and further activities per the note      BMI  Estimated body mass index is 37.6 kg/m  as calculated from the following:    Height as of this encounter: 1.745 m (5' 8.7\").    Weight as of this encounter: 114.5 kg (252 lb 6.4 oz).   Weight management plan: Discussed healthy diet and exercise guidelines  On Trulicity, increasing exercise; diet.      Patient Instructions   - Trial dialogue journal with daughter to help preserve your sleep    - I'm going to check with Any on clarification on CGM but will also place a referral to diabetes educator    - " I recommend Athlete's foot powder twice daily for 2 weeks to both feet    - I also recommend trying the Penlac nail lacquer to help with toenail fungus    - I recommend buying mineral oil over the counter and putting 3-4 drops in each ear at nighttime 3-4 times per month (put a washcloth on your pillow in case ear wax drips onto your pillow), and then the next morning, let the shower water run into your ears and rinse out the wax.      Shy Almonte is a 48 year old, presenting for the following health issues:  Recheck Medication        2/23/2024     1:56 PM   Additional Questions   Roomed by Nicolle   Accompanied by Self         2/23/2024     1:56 PM   Patient Reported Additional Medications   Patient reports taking the following new medications None     History of Present Illness       Diabetes:   He presents for follow up of diabetes.  He is checking home blood glucose a few times a week.   He checks blood glucose before and after meals and at bedtime.  Blood glucose is never over 200 and never under 70. He is aware of hypoglycemia symptoms including lethargy.   He is concerned about other.   He is having numbness in feet and redness, sores, or blisters on feet.            He eats 2-3 servings of fruits and vegetables daily.He consumes 0 sweetened beverage(s) daily.He exercises with enough effort to increase his heart rate 10 to 19 minutes per day.  He exercises with enough effort to increase his heart rate 4 days per week.   He is taking medications regularly.     Patient identified using two patient identifiers.  Ear exam showing wax occlusion   completed by provider.  H202/H20 was placed in the bilateral ear(s) via irrigation tool: elephant ear.   Nicolle MARTINEZ MA     Daughters are so anxious about his diabetes that would like to get Dexcom to be able to show them the CGM.     Is using am lac cream on his feet but is having cracking of his feet.  No burning, tingling, or irritation.    Went 12 weeks without  "Trulicity because wasn't available- therefore weight loss had stalled.    Knee is still not great so that's affecting aerobic exercise.     Still feels tired during the day.  Wonders if having more sleep deprivation- daughter has been waking him up when she gets home at nighttime to talk to him and then he has to wake up at 5. I wonder if could be due to lower blood pressures.    No more concussion symptoms- but is going to go back to neurologist.  Has job together, but just hired  because feels too overwhelmed and disorganized- feels worse since concussion. Wonders if has ADHD- that's the level of disorganization that he has.     Does sometimes worry about hearing loss sometimes. Wonders if due to wax build up which he's had before.    Was on sertraline for outbursts after concussion on 10/16- no longer on sertraline; tapered off after [place].        Objective    /68 (BP Location: Right arm, Patient Position: Sitting, Cuff Size: Adult Large)   Pulse 85   Temp 98  F (36.7  C) (Temporal)   Resp 18   Ht 1.745 m (5' 8.7\")   Wt 114.5 kg (252 lb 6.4 oz)   SpO2 97%   BMI 37.60 kg/m    Body mass index is 37.6 kg/m .  Physical Exam   GENERAL: alert and no distress  MS: no gross musculoskeletal defects noted, no edema; feet cracked mildly erythematous, some scaling and flaking between toes, toenail fungus present.  PSYCH: mentation appears normal, affect normal/bright            Signed Electronically by: Idania Nolan, DO    "

## 2024-02-23 NOTE — Clinical Note
Hugh Agarwal! I saw Gage today- he was wondering about CGM and wanted me to let you know his rationale is that he thinks it would quell some of his daughters' anxiety about his diabetes and his health overall so he can show them where his glucose is.  I couldn't tell which one would be covered by insurance- I put a diabetes mellitus educator consult in unless you prefer to give guidance on the CGM type? -Rosette

## 2024-02-25 RX ORDER — LISINOPRIL 20 MG/1
20 TABLET ORAL DAILY
Qty: 90 TABLET | Refills: 3 | Status: SHIPPED | OUTPATIENT
Start: 2024-02-25 | End: 2024-07-22

## 2024-02-26 LAB — IGA SERPL-MCNC: 231 MG/DL (ref 84–499)

## 2024-03-11 ENCOUNTER — TRANSFERRED RECORDS (OUTPATIENT)
Dept: MULTI SPECIALTY CLINIC | Facility: CLINIC | Age: 49
End: 2024-03-11

## 2024-03-11 LAB — RETINOPATHY: NORMAL

## 2024-03-18 ENCOUNTER — TELEPHONE (OUTPATIENT)
Dept: FAMILY MEDICINE | Facility: CLINIC | Age: 49
End: 2024-03-18

## 2024-03-18 NOTE — TELEPHONE ENCOUNTER
Prior Authorization Retail Medication Request    Medication/Dose: dulaglutide (TRULICITY) 1.5 MG/0.5ML pen   Diagnosis and ICD code (if different than what is on RX):  Type 2 diabetes mellitus without complication, without long-term current use of insulin (H) [E11.9]    Insurance   Primary: [employer]   Insurance ID:  [id number]     Pharmacy Information (if different than what is on RX)  Name:  [facility]  Phone:  [phone]   Fax: [phone]

## 2024-03-28 NOTE — TELEPHONE ENCOUNTER
PA Initiation    Medication: TRULICITY 1.5 MG/0.5ML SC SOPN  Insurance Company: [employer] - Phone [phone] Fax [phone]  Pharmacy Filling the Rx: [facility] - [place], [place] - [address]  Filling Pharmacy Phone: [phone]  Filling Pharmacy Fax: [phone]  Start Date: 3/28/2024        Note: Due to record-high volumes, our turn-around time is taking longer than usual . We are currently 10 business days behind in the pools.   We are working diligently to submit all requests in a timely manner and in the order they are received. Please only flag TRUE URGENT requests as high priority to the pool at this time.   If you have questions - please send a note/message in the active PA encounter and send back to the RPPA (Retail Pharmacy Prior Authorization) team [[id number]].    If you have more specific questions about our process please reach out to our supervisor Winsome Jacobs.   Thank you!

## 2024-04-01 NOTE — TELEPHONE ENCOUNTER
Prior Authorization Approval    Medication: TRULICITY 1.5 MG/0.5ML SC SOPN  Authorization Effective Date: 3/28/2024  Authorization Expiration Date: 3/28/2027  Approved Dose/Quantity:         Reference #:     Insurance Company: [employer] - [employer] [phone] Fax [phone]  Expected CoPay: $    CoPay Card Available:      Financial Assistance Needed:   Which Pharmacy is filling the prescription: [facility] - [place], [place] - [address]  Pharmacy Notified: Y  Patient Notified: Pharmacy will notify patient once order is ready.

## 2024-04-19 ENCOUNTER — ALLIED HEALTH/NURSE VISIT (OUTPATIENT)
Dept: FAMILY MEDICINE | Facility: CLINIC | Age: 49
End: 2024-04-19
Payer: COMMERCIAL

## 2024-04-19 DIAGNOSIS — Z23 NEED FOR VACCINATION: Primary | ICD-10-CM

## 2024-04-19 PROCEDURE — 99207 PR NO CHARGE NURSE ONLY: CPT

## 2024-04-19 PROCEDURE — 90471 IMMUNIZATION ADMIN: CPT

## 2024-04-19 PROCEDURE — 90750 HZV VACC RECOMBINANT IM: CPT

## 2024-04-19 NOTE — PROGRESS NOTES
Prior to immunization administration, verified patients identity using patient s name and date of birth. Please see Immunization Activity for additional information.     Screening Questionnaire for Adult Immunization    Are you sick today?   No   Do you have allergies to medications, food, a vaccine component or latex?   No   Have you ever had a serious reaction after receiving a vaccination?   No   Do you have a long-term health problem with heart, lung, kidney, or metabolic disease (e.g., diabetes), asthma, a blood disorder, no spleen, complement component deficiency, a cochlear implant, or a spinal fluid leak?  Are you on long-term aspirin therapy?   No   Do you have cancer, leukemia, HIV/AIDS, or any other immune system problem?   No   Do you have a parent, brother, or sister with an immune system problem?   No   In the past 3 months, have you taken medications that affect  your immune system, such as prednisone, other steroids, or anticancer drugs; drugs for the treatment of rheumatoid arthritis, Crohn s disease, or psoriasis; or have you had radiation treatments?   No   Have you had a seizure, or a brain or other nervous system problem?   No   During the past year, have you received a transfusion of blood or blood    products, or been given immune (gamma) globulin or antiviral drug?   No   For women: Are you pregnant or is there a chance you could become       pregnant during the next month?   No   Have you received any vaccinations in the past 4 weeks?   No     Immunization questionnaire answers were all negative.    I have reviewed the following standing orders:   This patient is due and qualifies for the Zoster vaccine.    Click here for Zoster Standing Order    I have reviewed the vaccines inclusion and exclusion criteria; No concerns regarding eligibility.     ABN signed and placed in STAT scanning bin    Patient instructed to remain in clinic for 15 minutes afterwards, and to report any adverse  reactions.     Screening performed by Malu Curiel CMA on 4/19/2024 at 1:59 PM.

## 2024-05-07 ENCOUNTER — TELEPHONE (OUTPATIENT)
Dept: FAMILY MEDICINE | Facility: CLINIC | Age: 49
End: 2024-05-07
Payer: COMMERCIAL

## 2024-05-07 DIAGNOSIS — E11.9 TYPE 2 DIABETES MELLITUS WITHOUT COMPLICATION, WITHOUT LONG-TERM CURRENT USE OF INSULIN (H): ICD-10-CM

## 2024-05-07 RX ORDER — METFORMIN HCL 500 MG
1000 TABLET, EXTENDED RELEASE 24 HR ORAL 2 TIMES DAILY WITH MEALS
Qty: 90 TABLET | Refills: 0 | Status: SHIPPED | OUTPATIENT
Start: 2024-05-07 | End: 2024-06-10

## 2024-05-25 ENCOUNTER — HEALTH MAINTENANCE LETTER (OUTPATIENT)
Age: 49
End: 2024-05-25

## 2024-06-10 DIAGNOSIS — E11.9 TYPE 2 DIABETES MELLITUS WITHOUT COMPLICATION, WITHOUT LONG-TERM CURRENT USE OF INSULIN (H): ICD-10-CM

## 2024-06-10 RX ORDER — METFORMIN HCL 500 MG
1000 TABLET, EXTENDED RELEASE 24 HR ORAL 2 TIMES DAILY WITH MEALS
Qty: 90 TABLET | Refills: 1 | Status: SHIPPED | OUTPATIENT
Start: 2024-06-10 | End: 2024-07-22

## 2024-06-10 NOTE — TELEPHONE ENCOUNTER
Patient was given a small supply 15 day ago to hold him over   Pharmacy states that they requested this to be filled and have not heard back from us.   Do not see that we ever received the request, so if this could get taken care of asap for patient       MTM REFILLED TODAY .  Lab Results   Component Value Date    A1C 5.8 02/08/2024    A1C 6.1 10/06/2023    A1C 9.1 07/12/2023    A1C 5.1 03/05/2012       Any Delacruz Rph.  Medication Therapy Management Provider  [phone]

## 2024-06-19 DIAGNOSIS — E11.9 TYPE 2 DIABETES MELLITUS WITHOUT COMPLICATION, WITHOUT LONG-TERM CURRENT USE OF INSULIN (H): ICD-10-CM

## 2024-07-22 ENCOUNTER — OFFICE VISIT (OUTPATIENT)
Dept: FAMILY MEDICINE | Facility: CLINIC | Age: 49
End: 2024-07-22
Payer: COMMERCIAL

## 2024-07-22 VITALS
DIASTOLIC BLOOD PRESSURE: 75 MMHG | HEART RATE: 68 BPM | HEIGHT: 69 IN | TEMPERATURE: 97.6 F | WEIGHT: 249 LBS | BODY MASS INDEX: 36.88 KG/M2 | SYSTOLIC BLOOD PRESSURE: 120 MMHG | OXYGEN SATURATION: 96 % | RESPIRATION RATE: 16 BRPM

## 2024-07-22 DIAGNOSIS — Z00.00 ROUTINE GENERAL MEDICAL EXAMINATION AT A HEALTH CARE FACILITY: ICD-10-CM

## 2024-07-22 DIAGNOSIS — E78.5 HYPERLIPIDEMIA LDL GOAL <70: ICD-10-CM

## 2024-07-22 DIAGNOSIS — R79.89 ELEVATED FERRITIN: ICD-10-CM

## 2024-07-22 DIAGNOSIS — I10 ESSENTIAL HYPERTENSION: ICD-10-CM

## 2024-07-22 DIAGNOSIS — F41.1 GENERALIZED ANXIETY DISORDER: ICD-10-CM

## 2024-07-22 DIAGNOSIS — E66.01 CLASS 2 SEVERE OBESITY WITH SERIOUS COMORBIDITY AND BODY MASS INDEX (BMI) OF 36.0 TO 36.9 IN ADULT, UNSPECIFIED OBESITY TYPE (H): ICD-10-CM

## 2024-07-22 DIAGNOSIS — E66.812 CLASS 2 SEVERE OBESITY WITH SERIOUS COMORBIDITY AND BODY MASS INDEX (BMI) OF 36.0 TO 36.9 IN ADULT, UNSPECIFIED OBESITY TYPE (H): ICD-10-CM

## 2024-07-22 DIAGNOSIS — F33.1 MODERATE EPISODE OF RECURRENT MAJOR DEPRESSIVE DISORDER (H): ICD-10-CM

## 2024-07-22 DIAGNOSIS — H91.93 BILATERAL HEARING LOSS, UNSPECIFIED HEARING LOSS TYPE: ICD-10-CM

## 2024-07-22 DIAGNOSIS — E11.9 TYPE 2 DIABETES MELLITUS WITHOUT COMPLICATION, WITHOUT LONG-TERM CURRENT USE OF INSULIN (H): ICD-10-CM

## 2024-07-22 DIAGNOSIS — G47.33 OSA (OBSTRUCTIVE SLEEP APNEA): ICD-10-CM

## 2024-07-22 DIAGNOSIS — Z12.5 SCREENING FOR PROSTATE CANCER: ICD-10-CM

## 2024-07-22 LAB
ALBUMIN SERPL BCG-MCNC: 4.7 G/DL (ref 3.5–5.2)
ALP SERPL-CCNC: 76 U/L (ref 40–150)
ALT SERPL W P-5'-P-CCNC: 47 U/L (ref 0–70)
ANION GAP SERPL CALCULATED.3IONS-SCNC: 11 MMOL/L (ref 7–15)
AST SERPL W P-5'-P-CCNC: 34 U/L (ref 0–45)
BASOPHILS # BLD AUTO: 0 10E3/UL (ref 0–0.2)
BASOPHILS NFR BLD AUTO: 0 %
BILIRUB SERPL-MCNC: 0.6 MG/DL
BUN SERPL-MCNC: 14.1 MG/DL (ref 6–20)
CALCIUM SERPL-MCNC: 9.2 MG/DL (ref 8.8–10.4)
CHLORIDE SERPL-SCNC: 105 MMOL/L (ref 98–107)
CHOLEST SERPL-MCNC: 106 MG/DL
CREAT SERPL-MCNC: 0.91 MG/DL (ref 0.67–1.17)
EGFRCR SERPLBLD CKD-EPI 2021: >90 ML/MIN/1.73M2
EOSINOPHIL # BLD AUTO: 0.2 10E3/UL (ref 0–0.7)
EOSINOPHIL NFR BLD AUTO: 3 %
ERYTHROCYTE [DISTWIDTH] IN BLOOD BY AUTOMATED COUNT: 12.9 % (ref 10–15)
FASTING STATUS PATIENT QL REPORTED: YES
FASTING STATUS PATIENT QL REPORTED: YES
FERRITIN SERPL-MCNC: 357 NG/ML (ref 31–409)
GLUCOSE SERPL-MCNC: 96 MG/DL (ref 70–99)
HBA1C MFR BLD: 5.5 % (ref 0–5.6)
HCO3 SERPL-SCNC: 23 MMOL/L (ref 22–29)
HCT VFR BLD AUTO: 46.5 % (ref 40–53)
HDLC SERPL-MCNC: 27 MG/DL
HGB BLD-MCNC: 15.7 G/DL (ref 13.3–17.7)
IMM GRANULOCYTES # BLD: 0 10E3/UL
IMM GRANULOCYTES NFR BLD: 0 %
IRON BINDING CAPACITY (ROCHE): 281 UG/DL (ref 240–430)
IRON SATN MFR SERPL: 33 % (ref 15–46)
IRON SERPL-MCNC: 93 UG/DL (ref 61–157)
LDLC SERPL CALC-MCNC: 39 MG/DL
LYMPHOCYTES # BLD AUTO: 2.9 10E3/UL (ref 0.8–5.3)
LYMPHOCYTES NFR BLD AUTO: 44 %
MCH RBC QN AUTO: 29.5 PG (ref 26.5–33)
MCHC RBC AUTO-ENTMCNC: 33.8 G/DL (ref 31.5–36.5)
MCV RBC AUTO: 87 FL (ref 78–100)
MONOCYTES # BLD AUTO: 0.7 10E3/UL (ref 0–1.3)
MONOCYTES NFR BLD AUTO: 10 %
NEUTROPHILS # BLD AUTO: 2.9 10E3/UL (ref 1.6–8.3)
NEUTROPHILS NFR BLD AUTO: 43 %
NONHDLC SERPL-MCNC: 79 MG/DL
PLATELET # BLD AUTO: 223 10E3/UL (ref 150–450)
POTASSIUM SERPL-SCNC: 4.6 MMOL/L (ref 3.4–5.3)
PROT SERPL-MCNC: 7.7 G/DL (ref 6.4–8.3)
PSA SERPL DL<=0.01 NG/ML-MCNC: 0.76 NG/ML (ref 0–3.5)
RBC # BLD AUTO: 5.32 10E6/UL (ref 4.4–5.9)
SODIUM SERPL-SCNC: 139 MMOL/L (ref 135–145)
TRIGL SERPL-MCNC: 198 MG/DL
WBC # BLD AUTO: 6.6 10E3/UL (ref 4–11)

## 2024-07-22 PROCEDURE — 99214 OFFICE O/P EST MOD 30 MIN: CPT | Mod: 25 | Performed by: INTERNAL MEDICINE

## 2024-07-22 PROCEDURE — 83550 IRON BINDING TEST: CPT | Performed by: INTERNAL MEDICINE

## 2024-07-22 PROCEDURE — 83540 ASSAY OF IRON: CPT | Performed by: INTERNAL MEDICINE

## 2024-07-22 PROCEDURE — 36415 COLL VENOUS BLD VENIPUNCTURE: CPT | Performed by: INTERNAL MEDICINE

## 2024-07-22 PROCEDURE — 83036 HEMOGLOBIN GLYCOSYLATED A1C: CPT | Performed by: INTERNAL MEDICINE

## 2024-07-22 PROCEDURE — 80061 LIPID PANEL: CPT | Performed by: INTERNAL MEDICINE

## 2024-07-22 PROCEDURE — 82728 ASSAY OF FERRITIN: CPT | Performed by: INTERNAL MEDICINE

## 2024-07-22 PROCEDURE — 85025 COMPLETE CBC W/AUTO DIFF WBC: CPT | Performed by: INTERNAL MEDICINE

## 2024-07-22 PROCEDURE — 99396 PREV VISIT EST AGE 40-64: CPT | Performed by: INTERNAL MEDICINE

## 2024-07-22 PROCEDURE — 80053 COMPREHEN METABOLIC PANEL: CPT | Performed by: INTERNAL MEDICINE

## 2024-07-22 PROCEDURE — G0103 PSA SCREENING: HCPCS | Performed by: INTERNAL MEDICINE

## 2024-07-22 RX ORDER — METFORMIN HCL 500 MG
1000 TABLET, EXTENDED RELEASE 24 HR ORAL 2 TIMES DAILY WITH MEALS
Qty: 120 TABLET | Refills: 11 | Status: SHIPPED | OUTPATIENT
Start: 2024-07-22

## 2024-07-22 RX ORDER — ATORVASTATIN CALCIUM 20 MG/1
20 TABLET, FILM COATED ORAL DAILY
Qty: 90 TABLET | Refills: 3 | Status: SHIPPED | OUTPATIENT
Start: 2024-07-22

## 2024-07-22 RX ORDER — ATORVASTATIN CALCIUM 40 MG/1
40 TABLET, FILM COATED ORAL DAILY
Qty: 90 TABLET | Refills: 3 | Status: SHIPPED | OUTPATIENT
Start: 2024-07-22 | End: 2024-07-22

## 2024-07-22 RX ORDER — LISINOPRIL 20 MG/1
20 TABLET ORAL DAILY
Qty: 90 TABLET | Refills: 3 | Status: SHIPPED | OUTPATIENT
Start: 2024-07-22

## 2024-07-22 SDOH — HEALTH STABILITY: PHYSICAL HEALTH: ON AVERAGE, HOW MANY DAYS PER WEEK DO YOU ENGAGE IN MODERATE TO STRENUOUS EXERCISE (LIKE A BRISK WALK)?: 3 DAYS

## 2024-07-22 ASSESSMENT — SOCIAL DETERMINANTS OF HEALTH (SDOH): HOW OFTEN DO YOU GET TOGETHER WITH FRIENDS OR RELATIVES?: TWICE A WEEK

## 2024-07-22 NOTE — PATIENT INSTRUCTIONS
Adult congenital team at [facility]:  - [facility]- Vivek Pathak    [facility]- Mercedez Rain (female cardiologist)    - Call your insurance to ask about Freestyle Leona  - Let's try to switch you from Trulicity to Mounjaro (tirzepatide)  - If you switch to Mounjaro, then we would stop your Trulicity and your Jardiance- before I send the Mounjaro to the pharmacy- I'm going to ask Any about dosing.    - Start Mounjaro- biggest side effects are stomach fullness, discomfort, and nausea- we will reduce the risk of these side effects by cutting portions in half at each meal (and then eat a little more if still hungry) and by slowly increasing the dose.  The other big side effect is constipation, keep an eye on this and try Miralax or senna if needed to have a soft, easy bowel movement each day.    - Medications are often beneficial in treating anxiety and depression symptoms as well as ADHD. It will be important that each condition is treated, as treatment for one without the other may lead to an increase in symptoms (e.g., treating ADHD, but not anxiety, can lead to increased anxiety).? However, trying to manage symptoms without medications is reasonable in this case to start.  ??   - Book recommendations: ADHD 2.0, Succeeding with Adult ADHD, DBT Skills workbook, Complete Guide to Memory, A Radical Guide for Women with ADHD, Your Brain's Not Broken, The Couple's Guide to Thriving with ADHD, Taking Charge of Adult ADHD, Outside the Box- Rethinking ADD/ADHD in Children and Adults  ??   - Consider working with an ADHD  or individual therapist to learn skills to?assist with symptom management, as well as ways to improve relationships, etc. that may have been impacted by your symptoms.?   ??   - Individual therapy is recommended. Therapies focused on identifying and challenging problematic thought and behavior patterns while increasing the use of healthy coping skills has  been found to be effective in treating anxiety and depression. It will be important to set goals in this therapy and work actively toward achieving short-term successes that lead to the completion of each goal. Action-oriented therapies, such as CBT and ACT (Acceptance and Commitment Therapy) are particularly recommended for the treatment of chronic anxiety and depression.??      - The use of behavioral strategies such as diaphragmatic breathing, guided imagery, and mindfulness is often helpful in the management of anxiety and depressive symptoms.     - Look around at what resources are available for educational or workplace accommodations such as increased time for testing or a reduced distraction environment during exams.  Visit [employer] website too look at resources for workplace accommodations.      - Look into focusmate.com which facilitates virtual body doubling and can be helpful in completing self directed tasks at home     - ?The following compensatory strategies may be useful to cope with reported inattention symptoms:    a. Maintaining a predictable routine and structured environment that incorporates prioritized checklists and reminders (e.g., Post-Its).   b. When completing tasks, try to focus on one task at a time and complete it in its entirety before moving on to the next task.   c. Minimize background distractions when working on complex tasks. For example, TV, radio or ongoing conversations in the background may hinder ability to focus on the task at hand.   d. Take regular breaks from tasks that require prolonged attention. In general, regular breaks from complex tasks can help prevent lapses in attention, which can result in errors.   e. Outline the steps required to complete a task prior to beginning it, which can help ensure an organized approach. Use the outline to refer to throughout the task as a reminder of the steps to be completed.     - Make sure you are taking care of  your mind and body in other ways, too- like making sure you get enough sleep, eating healthy foods, and exercising.  Avoid mind altering substances that can worsen attention such as marijuana and alcohol.        Patient Education   Preventive Care Advice   This is general advice given by our system to help you stay healthy. However, your care team may have specific advice just for you. Please talk to your care team about your preventive care needs.  Nutrition  Eat 5 or more servings of fruits and vegetables each day.  Try wheat bread, brown rice and whole grain pasta (instead of white bread, rice, and pasta).  Get enough calcium and vitamin D. Check the label on foods and aim for 100% of the RDA (recommended daily allowance).  Lifestyle  Exercise at least 150 minutes each week  (30 minutes a day, 5 days a week).  Do muscle strengthening activities 2 days a week. These help control your weight and prevent disease.  No smoking.  Wear sunscreen to prevent skin cancer.  Have a dental exam and cleaning every 6 months.  Yearly exams  See your health care team every year to talk about:  Any changes in your health.  Any medicines your care team has prescribed.  Preventive care, family planning, and ways to prevent chronic diseases.  Shots (vaccines)   HPV shots (up to age 26), if you've never had them before.  Hepatitis B shots (up to age 59), if you've never had them before.  COVID-19 shot: Get this shot when it's due.  Flu shot: Get a flu shot every year.  Tetanus shot: Get a tetanus shot every 10 years.  Pneumococcal, hepatitis A, and RSV shots: Ask your care team if you need these based on your risk.  Shingles shot (for age 50 and up)  General health tests  Diabetes screening:  Starting at age 35, Get screened for diabetes at least every 3 years.  If you are younger than age 35, ask your care team if you should be screened for diabetes.  Cholesterol test: At age 39, start having a cholesterol test every 5 years, or more  often if advised.  Bone density scan (DEXA): At age 50, ask your care team if you should have this scan for osteoporosis (brittle bones).  Hepatitis C: Get tested at least once in your life.  STIs (sexually transmitted infections)  Before age 24: Ask your care team if you should be screened for STIs.  After age 24: Get screened for STIs if you're at risk. You are at risk for STIs (including HIV) if:  You are sexually active with more than one person.  You don't use condoms every time.  You or a partner was diagnosed with a sexually transmitted infection.  If you are at risk for HIV, ask about PrEP medicine to prevent HIV.  Get tested for HIV at least once in your life, whether you are at risk for HIV or not.  Cancer screening tests  Cervical cancer screening: If you have a cervix, begin getting regular cervical cancer screening tests starting at age 21.  Breast cancer scan (mammogram): If you've ever had breasts, begin having regular mammograms starting at age 40. This is a scan to check for breast cancer.  Colon cancer screening: It is important to start screening for colon cancer at age 45.  Have a colonoscopy test every 10 years (or more often if you're at risk) Or, ask your provider about stool tests like a FIT test every year or Cologuard test every 3 years.  To learn more about your testing options, visit:   .  For help making a decision, visit:   https://bit.ly/si00617.  Prostate cancer screening test: If you have a prostate, ask your care team if a prostate cancer screening test (PSA) at age 55 is right for you.  Lung cancer screening: If you are a current or former smoker ages 50 to 80, ask your care team if ongoing lung cancer screenings are right for you.  For informational purposes only. Not to replace the advice of your health care provider. Copyright   2023 [employer]. All rights reserved. Clinically reviewed by the [facility] Transitions Program. [employer] [id number] - REV 01/24.
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
## 2024-07-25 DIAGNOSIS — E78.5 HYPERLIPIDEMIA LDL GOAL <70: ICD-10-CM

## 2024-07-25 NOTE — TELEPHONE ENCOUNTER
Pending Prescriptions:                       Disp   Refills    atorvastatin (LIPITOR) 20 MG tablet       90 tab*3            Sig: Take 1 tablet (20 mg) by mouth daily

## 2024-07-26 RX ORDER — ATORVASTATIN CALCIUM 20 MG/1
20 TABLET, FILM COATED ORAL DAILY
Qty: 90 TABLET | Refills: 3 | OUTPATIENT
Start: 2024-07-26

## 2024-08-01 ENCOUNTER — MYC MEDICAL ADVICE (OUTPATIENT)
Dept: FAMILY MEDICINE | Facility: CLINIC | Age: 49
End: 2024-08-01
Payer: COMMERCIAL

## 2024-08-01 DIAGNOSIS — Z30.2 ENCOUNTER FOR VASECTOMY: Primary | ICD-10-CM

## 2024-08-02 NOTE — TELEPHONE ENCOUNTER
[employer] message sent to patient.  Emily LAGUERRE BSN, PHN, RN  [facility]  [phone]

## 2024-08-02 NOTE — TELEPHONE ENCOUNTER
Rosette: pended referral; Writer responded via [employer].    Do I need a referral for this procedure or do I just schedule an appointment with a provider? I delayed in doing it but now want to schedule.     Emily LAGUERRE, BSN, PHN, RN  [facility]  [phone]

## 2024-08-16 ENCOUNTER — OFFICE VISIT (OUTPATIENT)
Dept: AUDIOLOGY | Facility: CLINIC | Age: 49
End: 2024-08-16
Attending: INTERNAL MEDICINE
Payer: COMMERCIAL

## 2024-08-16 DIAGNOSIS — H91.93 BILATERAL HEARING LOSS, UNSPECIFIED HEARING LOSS TYPE: ICD-10-CM

## 2024-08-16 DIAGNOSIS — H93.293 ABNORMAL AUDITORY PERCEPTION OF BOTH EARS: Primary | ICD-10-CM

## 2024-08-16 PROCEDURE — 92550 TYMPANOMETRY & REFLEX THRESH: CPT | Performed by: AUDIOLOGIST

## 2024-08-16 PROCEDURE — 92557 COMPREHENSIVE HEARING TEST: CPT | Performed by: AUDIOLOGIST

## 2024-08-16 NOTE — PROGRESS NOTES
AUDIOLOGY REPORT    SUBJECTIVE:  Gage Carter is a 49 year old male who was seen in the Audiology Clinic at the [facility] for audiologic evaluation, referred by Idania Nolan DO. The patient reports that he has had concerns about hearing for around ten years. 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 His family has expressed concerns, and he often has to ask for repetitions. The patient reports a history of traumatic brain injury. He has had vertigo, but not currently. He notes a family history of hearing loss with age, and a history of occasional noise exposure in the past from concerts. The patient reports that he does not have tinnitus, ear pain, ear pressure, or a history of ear problems or ear surgery. The patient notes difficulty with communication in a variety of listening situations. The patient was unaccompanied to today's appointment.     OBJECTIVE:  Otoscopic exam indicates ears are clear of cerumen bilaterally     Pure Tone Thresholds assessed using conventional audiometry with good  reliability from 250-8000 Hz bilaterally using insert earphones and circumaural headphones     RIGHT:  normal hearing sensitivity at all tested frequencies     LEFT:    normal hearing sensitivity at all tested frequencies     Tympanogram:    RIGHT: normal eardrum mobility    LEFT:   normal eardrum mobility    Reflexes (reported by stimulus ear):  RIGHT: Ipsilateral is present at normal levels  RIGHT: Contralateral is present at normal levels  LEFT:   Ipsilateral is present at normal levels  LEFT:   Contralateral is present at normal levels    Speech Reception Threshold:    RIGHT: 15 dB HL    LEFT:   15 dB HL    Word Recognition Score:     RIGHT: 100% at 55 dB HL using NU-6 recorded word list.    LEFT:   96% at 55 dB HL using NU-6 recorded word list.      ASSESSMENT:     ICD-10-CM    1. Abnormal auditory perception of both ears   H93.293 [facility] Audiometry Thrshld Eval & Speech Recog ([id number])     Tymps / Reflex   ([id number])      2. Bilateral hearing loss, unspecified hearing loss type  H91.93 Adult Audiology  Referral    Noticing more frequently, Audiology referral placed.        Today s results were discussed with the patient in detail.     PLAN:  Patient was counseled regarding hearing and communication, and the limits of the standard audiogram in describing all hearing difficulty. It is recommended that the patient return as needed.  Please call this clinic with questions regarding these results or recommendations.      Vika Jj, CCC-A  [place] Licensed Audiologist #[id number]  8/16/2024

## 2024-09-14 ENCOUNTER — MYC MEDICAL ADVICE (OUTPATIENT)
Dept: FAMILY MEDICINE | Facility: CLINIC | Age: 49
End: 2024-09-14
Payer: COMMERCIAL

## 2024-09-16 ENCOUNTER — TELEPHONE (OUTPATIENT)
Dept: FAMILY MEDICINE | Facility: CLINIC | Age: 49
End: 2024-09-16
Payer: COMMERCIAL

## 2024-09-16 DIAGNOSIS — U07.1 INFECTION DUE TO 2019 NOVEL CORONAVIRUS: Primary | ICD-10-CM

## 2024-09-16 NOTE — TELEPHONE ENCOUNTER
RN COVID TREATMENT VISIT  09/16/24      The patient has been triaged and does not require a higher level of care.  9/12 onset of symptoms  Denies SOB, chest pian, or dyspnea  9/13 COVID+ positive    Gage Carter  49 year old  Current weight? 233 lb    Has the patient been seen by a primary care provider at an [facility] or [facility] within the past two years? Yes.   Have you been in close proximity to/do you have a known exposure to a person with a confirmed case of influenza? No.     General treatment eligibility:  Date of positive COVID test (PCR or at home)?  9/13    Are you or have you been hospitalized for this COVID-19 infection? No.   Have you received monoclonal antibodies or antiviral treatment for COVID-19 since this positive test? No.   Do you have any of the following conditions that place you at risk of being very sick from COVID-19?   - Age 50 years or older  - Diabetes mellitus, type 1 and type 2  Yes, patient has at least one high risk condition as noted above.     Current COVID symptoms:   - fever or chills  - fatigue  Yes. Patient has at least one symptom as selected.     How many days since symptoms started? 5 days or less. Established patient, 12 years or older weighing at least 88.2 lbs, who has symptoms that started in the past 5 days, has not been hospitalized nor received treatment already, and is at risk for being very sick from COVID-19.     Treatment eligibility by RN:  Are you currently pregnant or nursing? No  Do you have a clinically significant hypersensitivity to nirmatrelvir or ritonavir, or toxic epidermal necrolysis (TEN) or Harris-Cecilio Syndrome? No  Do you have a history of hepatitis, any hepatic impairment on the Problem List (such as Child-Conde Class C, cirrhosis, fatty liver disease, alcoholic liver disease), or was the last liver lab (hepatic panel, ALT, AST, ALK Phos, bilirubin) elevated in the past 6 months? No  Do you have any  history of severe renal impairment (eGFR < 30mL/min)? No    Is patient eligible to continue? Yes, patient meets all eligibility requirements for the RN COVID treatment (as denoted by all no responses above).     Current Outpatient Medications   Medication Sig Dispense Refill    ammonium lactate (AMLACTIN) 12 % external cream Apply topically 2 times daily 385 g 3    atorvastatin (LIPITOR) 20 MG tablet Take 1 tablet (20 mg) by mouth daily 90 tablet 3    blood glucose (ACCU-CHEK GUIDE) test strip Use to test blood sugar 2-3 times daily or as directed. 100 strip 11    blood glucose monitoring (SOFTCLIX) lancets Use to test blood sugar 2-3 times daily or as directed. 100 each 11    ciclopirox (PENLAC) 8 % external solution Apply to adjacent skin and affected nails daily.  Remove with alcohol every 7 days, then repeat. 6.6 mL 11    lisinopril (ZESTRIL) 20 MG tablet Take 1 tablet (20 mg) by mouth daily 90 tablet 3    metFORMIN (GLUCOPHAGE XR) 500 MG 24 hr tablet Take 2 tablets (1,000 mg) by mouth 2 times daily (with meals) 120 tablet 11    miconazole (MICATIN) 2 % external powder Apply topically 2 times daily For 2 weeks.  Apply to both feet. 85 g 0    tirzepatide (MOUNJARO) 5 MG/0.5ML pen Inject 5 mg subcutaneously every 7 days 2 mL 0    tirzepatide (MOUNJARO) 7.5 MG/0.5ML pen Inject 7.5 mg subcutaneously every 7 days 2 mL 1       Medications from List 1 of the standing order (on medications that exclude the use of Paxlovid) that patient is taking: NONE. Is patient taking Gridley's Wort? No  Is patient taking Tano's Wort or any meds from List 1? No.   Medications from List 2 of the standing order (on meds that provider needs to adjust) that patient is taking: NONE. Is patient on any of the meds from List 2? No.   Medications from List 3 of standing order (on meds that a RN needs to adjust) that patient is taking: atorvastatin (Lipitor): Instructed patient to stop atorvastatin while taking Paxlovid and restart  atorvastatin 1 day after the completion of Paxlovid.  Is patient on any meds from List 3? Yes. Patient is on meds from list 3. No meds require a provider visit and at least one med required RN to adjust.     Paxlovid has an approximate 90% reduction in hospitalization. Paxlovid can possibly cause altered sense of taste, diarrhea (loose, watery stools), high blood pressure, muscle aches.     Would patient like a Paxlovid prescription?   Yes.   Lab Results   Component Value Date    GFRESTIMATED >90 07/22/2024       Was last eGFR reduced? No, eGFR 60 or greater/ No Result on record. Patient can receive the normal renal function dose. Paxlovid Rx sent to [facility]       Temporary change to home medications: yes    All medication adjustments (holds, etc) were discussed with the patient and patient was asked to repeat back (teachback) their med adjustment.  Did patient understand med adjustment? Yes, patient repeated back and understood correctly.        Reviewed the following instructions with the patient:    Paxlovid (nimatrelvir and ritonavir)    How it works  Two medicines (nirmatrelvir and ritonavir) are taken together. They stop the virus from growing. Less amount of virus is easier for your body to fight.    How to take  Medicine comes in a daily container with both medicine tablets. Take by mouth twice daily (once in the morning, once at night) for 5 days.  The number of tablets to take varies by patient.  Don't chew or break capsules. Swallow whole.    When to take  Take as soon as possible after positive COVID-19 test result, and within 5 days of your first symptoms.    Possible side effects  Can cause altered sense of taste, diarrhea (loose, watery stools), high blood pressure, muscle aches.    Patient to call and ask to speak to triage nurse if develops any new/worsening symptoms. Patient verbalized understanding and agrees with plan.    Gricel Abad RN

## 2024-09-16 NOTE — TELEPHONE ENCOUNTER
Writer responded via [employer].  JARROD TylerN, RN-BC  [facility]

## 2024-09-20 DIAGNOSIS — E11.9 TYPE 2 DIABETES MELLITUS WITHOUT COMPLICATION, WITHOUT LONG-TERM CURRENT USE OF INSULIN (H): ICD-10-CM

## 2024-09-20 RX ORDER — EMPAGLIFLOZIN 10 MG/1
10 TABLET, FILM COATED ORAL DAILY
Qty: 90 TABLET | Refills: 1 | OUTPATIENT
Start: 2024-09-20

## 2024-09-20 NOTE — TELEPHONE ENCOUNTER
Clinic RN: Please contact patient because the medication is listed as historical or discontinued. Confirm patient is taking this medication. Document findings and route refill encounter to provider for approval or denial.    Trupti Randall RN  [facility]

## 2024-09-20 NOTE — TELEPHONE ENCOUNTER
Jardiance was discontinued at 7/22/24 visit.  Emily LAGUERRE, BSN, PHN, RN-[facility]  [phone]

## 2024-10-04 ENCOUNTER — OFFICE VISIT (OUTPATIENT)
Dept: FAMILY MEDICINE | Facility: CLINIC | Age: 49
End: 2024-10-04
Payer: COMMERCIAL

## 2024-10-04 ENCOUNTER — TELEPHONE (OUTPATIENT)
Dept: FAMILY MEDICINE | Facility: CLINIC | Age: 49
End: 2024-10-04

## 2024-10-04 VITALS
DIASTOLIC BLOOD PRESSURE: 74 MMHG | SYSTOLIC BLOOD PRESSURE: 117 MMHG | RESPIRATION RATE: 16 BRPM | BODY MASS INDEX: 34.96 KG/M2 | HEART RATE: 70 BPM | WEIGHT: 236 LBS | OXYGEN SATURATION: 99 % | HEIGHT: 69 IN | TEMPERATURE: 97.3 F

## 2024-10-04 DIAGNOSIS — I10 ESSENTIAL HYPERTENSION: ICD-10-CM

## 2024-10-04 DIAGNOSIS — Z23 NEED FOR PROPHYLACTIC VACCINATION AND INOCULATION AGAINST INFLUENZA: ICD-10-CM

## 2024-10-04 DIAGNOSIS — E11.9 TYPE 2 DIABETES MELLITUS WITHOUT COMPLICATION, WITHOUT LONG-TERM CURRENT USE OF INSULIN (H): Primary | ICD-10-CM

## 2024-10-04 DIAGNOSIS — E78.5 HYPERLIPIDEMIA LDL GOAL <70: ICD-10-CM

## 2024-10-04 LAB
ALBUMIN SERPL BCG-MCNC: 4.8 G/DL (ref 3.5–5.2)
ALP SERPL-CCNC: 76 U/L (ref 40–150)
ALT SERPL W P-5'-P-CCNC: 43 U/L (ref 0–70)
ANION GAP SERPL CALCULATED.3IONS-SCNC: 12 MMOL/L (ref 7–15)
AST SERPL W P-5'-P-CCNC: 29 U/L (ref 0–45)
BILIRUB SERPL-MCNC: 0.7 MG/DL
BUN SERPL-MCNC: 12.8 MG/DL (ref 6–20)
CALCIUM SERPL-MCNC: 9.6 MG/DL (ref 8.8–10.4)
CHLORIDE SERPL-SCNC: 105 MMOL/L (ref 98–107)
CREAT SERPL-MCNC: 0.95 MG/DL (ref 0.67–1.17)
CREAT UR-MCNC: 115 MG/DL
EGFRCR SERPLBLD CKD-EPI 2021: >90 ML/MIN/1.73M2
EST. AVERAGE GLUCOSE BLD GHB EST-MCNC: 103 MG/DL
FASTING STATUS PATIENT QL REPORTED: NO
GLUCOSE SERPL-MCNC: 93 MG/DL (ref 70–99)
HBA1C MFR BLD: 5.2 % (ref 0–5.6)
HCO3 SERPL-SCNC: 23 MMOL/L (ref 22–29)
MICROALBUMIN UR-MCNC: 21.7 MG/L
MICROALBUMIN/CREAT UR: 18.87 MG/G CR (ref 0–17)
POTASSIUM SERPL-SCNC: 4.4 MMOL/L (ref 3.4–5.3)
PROT SERPL-MCNC: 7.7 G/DL (ref 6.4–8.3)
SODIUM SERPL-SCNC: 140 MMOL/L (ref 135–145)

## 2024-10-04 PROCEDURE — 82570 ASSAY OF URINE CREATININE: CPT | Performed by: INTERNAL MEDICINE

## 2024-10-04 PROCEDURE — 99214 OFFICE O/P EST MOD 30 MIN: CPT | Mod: 25 | Performed by: INTERNAL MEDICINE

## 2024-10-04 PROCEDURE — 83036 HEMOGLOBIN GLYCOSYLATED A1C: CPT | Performed by: INTERNAL MEDICINE

## 2024-10-04 PROCEDURE — 82043 UR ALBUMIN QUANTITATIVE: CPT | Performed by: INTERNAL MEDICINE

## 2024-10-04 PROCEDURE — 36415 COLL VENOUS BLD VENIPUNCTURE: CPT | Performed by: INTERNAL MEDICINE

## 2024-10-04 PROCEDURE — 90656 IIV3 VACC NO PRSV 0.5 ML IM: CPT | Performed by: INTERNAL MEDICINE

## 2024-10-04 PROCEDURE — 80061 LIPID PANEL: CPT | Performed by: INTERNAL MEDICINE

## 2024-10-04 PROCEDURE — 80053 COMPREHEN METABOLIC PANEL: CPT | Performed by: INTERNAL MEDICINE

## 2024-10-04 PROCEDURE — 90471 IMMUNIZATION ADMIN: CPT | Performed by: INTERNAL MEDICINE

## 2024-10-04 RX ORDER — KETOROLAC TROMETHAMINE 30 MG/ML
1 INJECTION, SOLUTION INTRAMUSCULAR; INTRAVENOUS ONCE
Qty: 1 EACH | Refills: 0 | Status: SHIPPED | OUTPATIENT
Start: 2024-10-04 | End: 2024-10-04

## 2024-10-04 RX ORDER — HYDROCHLOROTHIAZIDE 12.5 MG/1
CAPSULE ORAL
Qty: 6 EACH | Refills: 3 | Status: SHIPPED | OUTPATIENT
Start: 2024-10-04

## 2024-10-04 ASSESSMENT — PATIENT HEALTH QUESTIONNAIRE - PHQ9
10. IF YOU CHECKED OFF ANY PROBLEMS, HOW DIFFICULT HAVE THESE PROBLEMS MADE IT FOR YOU TO DO YOUR WORK, TAKE CARE OF THINGS AT HOME, OR GET ALONG WITH OTHER PEOPLE: SOMEWHAT DIFFICULT
SUM OF ALL RESPONSES TO PHQ QUESTIONS 1-9: 1
SUM OF ALL RESPONSES TO PHQ QUESTIONS 1-9: 1

## 2024-10-04 ASSESSMENT — PAIN SCALES - GENERAL: PAINLEVEL: NO PAIN (0)

## 2024-10-04 NOTE — PATIENT INSTRUCTIONS
- I recommending waiting until it's been 3 months after your COVID infection before you get your next COVID shot    - I will send you a MyC message in 1 month to see if you want to go up on the Mounjaro to the next dose- base this decision on your weight loss and on your symptoms (as in- you feel good after the injection)    -

## 2024-10-04 NOTE — PROGRESS NOTES
"  Assessment & Plan     (E11.9) Type 2 diabetes mellitus without complication, without long-term current use of insulin (H)  (primary encounter diagnosis)  Comment: Cont 7.5 mg Mounjaro- just incr to this dose, will check in in 1 mo, cont metformin.  Foot exam today- had calluses, has seen podiatry, recommend starting AmLac as prescribed.  Plan: Comprehensive metabolic panel (BMP + Alb, Alk         Phos, ALT, AST, Total. Bili, TP), Albumin         Random Urine Quantitative with Creat Ratio,         Hemoglobin A1c, Continuous Glucose          (FREESTYLE ALIYAH 3 READER) KIRIT, Continuous         Glucose Sensor (FREESTYLE ALIYAH 3 PLUS SENSOR)         MISC, Adult Diabetes Education          Referral, Lipid panel reflex to direct LDL         Non-fasting  - Interested in CGM- ordered; will also see CDE    (E78.5) Hyperlipidemia LDL goal <70  Comment: Lowered atorvastatin to 20 mg daily- check LDL to ensure still <70.    (I10) Essential hypertension  Comment: Well controlled on lisinopril- continue; goal <130/80.    (Z23) Need for prophylactic vaccination and inoculation against influenza  Comment: Done             BMI  Estimated body mass index is 34.85 kg/m  as calculated from the following:    Height as of this encounter: 1.753 m (5' 9\").    Weight as of this encounter: 107 kg (236 lb).   Weight management plan: Discussed healthy diet and exercise guidelines      Patient Instructions   - I recommending waiting until it's been 3 months after your COVID infection before you get your next COVID shot    - I will send you a MyC message in 1 month to see if you want to go up on the Mounjaro to the next dose- base this decision on your weight loss and on your symptoms (as in- you feel good after the injection)    -     Shy Almonte is a 49 year old, presenting for the following health issues:  Diabetes (Diabetic Check - tirzepatide (MOUNJARO) 7.5 MG/0.5ML pen - should be taking soon - didn't know to call " in)        10/4/2024     9:31 AM   Additional Questions   Roomed by Kalpana Bergman     History of Present Illness       Diabetes:   He presents for follow up of diabetes.  He is checking home blood glucose a few times a month.   He checks blood glucose at bedtime.  Blood glucose is never over 200 and never under 70. He is aware of hypoglycemia symptoms including dizziness and lethargy.   He is concerned about other.    He is not experiencing numbness or burning in feet, excessive thirst, blurry vision, weight changes or redness, sores or blisters on feet.           He eats 2-3 servings of fruits and vegetables daily.He consumes 0 sweetened beverage(s) daily.He exercises with enough effort to increase his heart rate 20 to 29 minutes per day.  He exercises with enough effort to increase his heart rate 3 or less days per week.   He is taking medications regularly.     Last saw 7/22/24- switched from Trulicity to Mounjaro; stopped Jardiance, reduced atorvastatin to 20 mg daily.  Tolerating Mounjaro well.  Interested in CGM to help track glucoses better.  Just got the 7.5 mg weekly dose.    9/14/24- had COVID, dad in hospital after hip fracture, need to cancel vasectomy; insurance cost    7/30/24- Neurology follow up- start occupational therapy at [facility] ([facility] 27/30), anxiety/PTSD mgmt, if ongoing struggle with brain fog, then follow up with Dr. Keyes for reassessment    Did start occupational therapy- went to the person twice, did a follow up with neurologist (this is all out of pocket cost)- received some tips from occupational therapy and using michael to use to help with organization, this was helpful.  Feels like things are going well now.  Using strategies occupational therapist gave him.    Did see eye doctor- planning to get prescription glasses.             Objective    /74 (BP Location: Right arm, Patient Position: Sitting, Cuff Size: Adult Large)   Pulse 70   Temp 97.3  F (36.3  C) (Temporal)   Resp 16   Ht  "1.753 m (5' 9\")   Wt 107 kg (236 lb)   SpO2 99%   BMI 34.85 kg/m    Body mass index is 34.85 kg/m .  Physical Exam   GENERAL: alert and no distress  NECK: no adenopathy, no asymmetry, masses, or scars  RESP: lungs clear to auscultation - no rales, rhonchi or wheezes  CV: regular rate and rhythm, normal S1 S2, no S3 or S4, no murmur, click or rub, no peripheral edema  MS: no gross musculoskeletal defects noted, no edema  PSYCH: mentation appears normal, affect normal/bright  Diabetic foot exam: normal DP and PT pulses, no trophic changes or ulcerative lesions, normal sensory exam, and monofilament exam with diminished sensation in left lateral aspect of feet bilaterally; calluses present.            Signed Electronically by: Idania Nolan DO    "

## 2024-10-05 ENCOUNTER — MYC MEDICAL ADVICE (OUTPATIENT)
Dept: FAMILY MEDICINE | Facility: CLINIC | Age: 49
End: 2024-10-05
Payer: COMMERCIAL

## 2024-10-05 ENCOUNTER — MYC REFILL (OUTPATIENT)
Dept: FAMILY MEDICINE | Facility: CLINIC | Age: 49
End: 2024-10-05
Payer: COMMERCIAL

## 2024-10-05 DIAGNOSIS — E11.9 TYPE 2 DIABETES MELLITUS WITHOUT COMPLICATION, WITHOUT LONG-TERM CURRENT USE OF INSULIN (H): ICD-10-CM

## 2024-10-05 LAB
CHOLEST SERPL-MCNC: 97 MG/DL
FASTING STATUS PATIENT QL REPORTED: NO
HDLC SERPL-MCNC: 28 MG/DL
LDLC SERPL CALC-MCNC: 45 MG/DL
NONHDLC SERPL-MCNC: 69 MG/DL
TRIGL SERPL-MCNC: 122 MG/DL

## 2024-10-05 RX ORDER — KETOROLAC TROMETHAMINE 30 MG/ML
1 INJECTION, SOLUTION INTRAMUSCULAR; INTRAVENOUS ONCE
Qty: 1 EACH | Refills: 0 | Status: CANCELLED | OUTPATIENT
Start: 2024-10-05 | End: 2024-10-05

## 2024-10-07 NOTE — TELEPHONE ENCOUNTER
Writer responded via [employer].  JARROD TylerN, RN-BC  [facility]

## 2024-10-10 NOTE — TELEPHONE ENCOUNTER
Replied in separate message chain.    Thank you!     Idania Nolan, DO  Internal Medicine - Pediatrics Physician  [facility]

## 2024-10-10 NOTE — TELEPHONE ENCOUNTER
Central Prior Authorization Team   Phone: [phone]    PA Initiation    Medication: Freestyle leona 3 [place] and Freestyle Leona 3 Plus sensor  Insurance Company: [employer] Non-Specialty PA's - Phone [phone] Fax [phone]  Pharmacy Filling the Rx: [facility] - [address] [facility]  Filling Pharmacy Phone: [phone]  Filling Pharmacy Fax:    Start Date: 10/9/2024          
Central Prior Authorization Team   Phone: [phone]    PA Initiation    Medication: Freestyle leona 3 [place] and Freestyle Leona 3 Plus sensor  Insurance Company: [employer] Non-Specialty PA's - Phone [phone] Fax [phone]  Pharmacy Filling the Rx: [facility] - [address] [facility]  Filling Pharmacy Phone: [phone]  Filling Pharmacy Fax:    Start Date: 10/9/2024          
I recommend he meet with the diabetes educator because I suspect she will have other recommendations to get a CGM in an affordable way.    Idania Nolan, DO  Internal Medicine - Pediatrics Physician  [facility]    
[employer] message sent to patient.  Emily LAGUERRE BSN, PHN, RN-[facility]  [phone]    
PRIOR AUTHORIZATION DENIED    Medication: Freestyle leona 3 [place] and Freestyle Leona 3 Plus sensor    Denial Date: 10/9/2024    Denial Rational:  Per insurance, medication is excluded from patient's benefit plan and will not be covered. Review and appeal are not available because of this exclusion.            Appeal Information:  N/A    
Please review and advise next steps-  
Please start prior authorization for:  Freestyle leona 3 [place] and Freestyle Leona 3 Plus sensor.    Thank you!  JARROD TylerN, RN-BC  [facility]    
underweight for age/underweight

## 2024-10-10 NOTE — TELEPHONE ENCOUNTER
"\"  Topic: Non-Medical Question.     I learned from Yamini T the process will take up to 2 weeks.  I spend so much on insurance I can t comprehend how they can delay coverage from something they told me over the phone would be covered 70 percent then later 100 percent depending on where I m at with deductible.  HP can enjoy the 1k plus balance due as I file another appeal regarding the ER tests I was charged earlier in the year for a condition which almost mirrored a previous er visit.  Thanks for the update but disappointing.  Like clockwork I am almost passed out around 5:15 pm yet can t easily monitor blood sugar levels for myself.     They denied my coverage for now.   I ll get back on with insurance      [facility] : Hugh BACA, Your prior authorization was denied, please follow up with your provider. If you have any questions, please contact us at [phone]\"  "

## 2024-10-13 ENCOUNTER — MYC REFILL (OUTPATIENT)
Dept: PHARMACY | Facility: CLINIC | Age: 49
End: 2024-10-13

## 2024-10-13 DIAGNOSIS — E11.9 TYPE 2 DIABETES MELLITUS WITHOUT COMPLICATION, WITHOUT LONG-TERM CURRENT USE OF INSULIN (H): ICD-10-CM

## 2024-12-03 DIAGNOSIS — E66.01 CLASS 2 SEVERE OBESITY WITH SERIOUS COMORBIDITY AND BODY MASS INDEX (BMI) OF 36.0 TO 36.9 IN ADULT, UNSPECIFIED OBESITY TYPE (H): ICD-10-CM

## 2024-12-03 DIAGNOSIS — E66.812 CLASS 2 SEVERE OBESITY WITH SERIOUS COMORBIDITY AND BODY MASS INDEX (BMI) OF 36.0 TO 36.9 IN ADULT, UNSPECIFIED OBESITY TYPE (H): ICD-10-CM

## 2024-12-03 DIAGNOSIS — E11.9 TYPE 2 DIABETES MELLITUS WITHOUT COMPLICATION, WITHOUT LONG-TERM CURRENT USE OF INSULIN (H): ICD-10-CM

## 2025-01-25 ENCOUNTER — HEALTH MAINTENANCE LETTER (OUTPATIENT)
Age: 50
End: 2025-01-25

## 2025-02-05 ENCOUNTER — E-VISIT (OUTPATIENT)
Dept: FAMILY MEDICINE | Facility: CLINIC | Age: 50
End: 2025-02-05
Payer: COMMERCIAL

## 2025-02-05 DIAGNOSIS — E11.9 TYPE 2 DIABETES MELLITUS WITHOUT COMPLICATION, WITHOUT LONG-TERM CURRENT USE OF INSULIN (H): Primary | ICD-10-CM

## 2025-02-05 PROCEDURE — 99207 PR NON-BILLABLE SERV PER CHARTING: CPT | Performed by: INTERNAL MEDICINE

## 2025-02-06 NOTE — PATIENT INSTRUCTIONS
Thank you for choosing us for your care. I have placed the below lab(s) for you:  Orders Placed This Encounter   Procedures     Hemoglobin A1c        To schedule your lab appointment, please click the Schedule button in your [employer] Home Page.

## 2025-02-11 ENCOUNTER — LAB (OUTPATIENT)
Dept: LAB | Facility: CLINIC | Age: 50
End: 2025-02-11
Attending: INTERNAL MEDICINE
Payer: COMMERCIAL

## 2025-02-11 DIAGNOSIS — E11.9 TYPE 2 DIABETES MELLITUS WITHOUT COMPLICATION, WITHOUT LONG-TERM CURRENT USE OF INSULIN (H): ICD-10-CM

## 2025-02-11 LAB
EST. AVERAGE GLUCOSE BLD GHB EST-MCNC: 105 MG/DL
HBA1C MFR BLD: 5.3 % (ref 0–5.6)

## 2025-02-11 PROCEDURE — 36415 COLL VENOUS BLD VENIPUNCTURE: CPT

## 2025-02-11 PROCEDURE — 83036 HEMOGLOBIN GLYCOSYLATED A1C: CPT

## 2025-04-08 ENCOUNTER — OFFICE VISIT (OUTPATIENT)
Dept: FAMILY MEDICINE | Facility: CLINIC | Age: 50
End: 2025-04-08
Payer: COMMERCIAL

## 2025-04-08 VITALS
SYSTOLIC BLOOD PRESSURE: 119 MMHG | WEIGHT: 244.3 LBS | HEART RATE: 80 BPM | OXYGEN SATURATION: 100 % | TEMPERATURE: 98.1 F | HEIGHT: 69 IN | DIASTOLIC BLOOD PRESSURE: 77 MMHG | BODY MASS INDEX: 36.18 KG/M2 | RESPIRATION RATE: 18 BRPM

## 2025-04-08 DIAGNOSIS — E11.9 TYPE 2 DIABETES MELLITUS WITHOUT COMPLICATION, WITHOUT LONG-TERM CURRENT USE OF INSULIN (H): ICD-10-CM

## 2025-04-08 DIAGNOSIS — F33.42 RECURRENT MAJOR DEPRESSIVE DISORDER, IN FULL REMISSION: ICD-10-CM

## 2025-04-08 DIAGNOSIS — I10 ESSENTIAL HYPERTENSION: ICD-10-CM

## 2025-04-08 DIAGNOSIS — G47.33 OSA (OBSTRUCTIVE SLEEP APNEA): ICD-10-CM

## 2025-04-08 DIAGNOSIS — E78.5 HYPERLIPIDEMIA LDL GOAL <70: ICD-10-CM

## 2025-04-08 PROBLEM — E66.01 MORBID OBESITY (H): Status: RESOLVED | Noted: 2017-09-29 | Resolved: 2025-04-08

## 2025-04-08 LAB
EST. AVERAGE GLUCOSE BLD GHB EST-MCNC: 108 MG/DL
HBA1C MFR BLD: 5.4 % (ref 0–5.6)

## 2025-04-08 PROCEDURE — 96127 BRIEF EMOTIONAL/BEHAV ASSMT: CPT | Performed by: INTERNAL MEDICINE

## 2025-04-08 PROCEDURE — 90480 ADMN SARSCOV2 VAC 1/ONLY CMP: CPT | Performed by: INTERNAL MEDICINE

## 2025-04-08 PROCEDURE — 99214 OFFICE O/P EST MOD 30 MIN: CPT | Mod: 25 | Performed by: INTERNAL MEDICINE

## 2025-04-08 PROCEDURE — 36415 COLL VENOUS BLD VENIPUNCTURE: CPT | Performed by: INTERNAL MEDICINE

## 2025-04-08 PROCEDURE — 83721 ASSAY OF BLOOD LIPOPROTEIN: CPT | Mod: 59 | Performed by: INTERNAL MEDICINE

## 2025-04-08 PROCEDURE — 91320 SARSCV2 VAC 30MCG TRS-SUC IM: CPT | Performed by: INTERNAL MEDICINE

## 2025-04-08 PROCEDURE — 83036 HEMOGLOBIN GLYCOSYLATED A1C: CPT | Performed by: INTERNAL MEDICINE

## 2025-04-08 PROCEDURE — 80061 LIPID PANEL: CPT | Performed by: INTERNAL MEDICINE

## 2025-04-08 PROCEDURE — 3074F SYST BP LT 130 MM HG: CPT | Performed by: INTERNAL MEDICINE

## 2025-04-08 PROCEDURE — 3078F DIAST BP <80 MM HG: CPT | Performed by: INTERNAL MEDICINE

## 2025-04-08 ASSESSMENT — PATIENT HEALTH QUESTIONNAIRE - PHQ9
SUM OF ALL RESPONSES TO PHQ QUESTIONS 1-9: 2
SUM OF ALL RESPONSES TO PHQ QUESTIONS 1-9: 2
10. IF YOU CHECKED OFF ANY PROBLEMS, HOW DIFFICULT HAVE THESE PROBLEMS MADE IT FOR YOU TO DO YOUR WORK, TAKE CARE OF THINGS AT HOME, OR GET ALONG WITH OTHER PEOPLE: NOT DIFFICULT AT ALL

## 2025-04-08 NOTE — PROGRESS NOTES
"  Assessment & Plan     (E11.9) Type 2 diabetes mellitus without complication, without long-term current use of insulin (H)  Comment: Very well controlled, but tirzepatide too expensive so had to stop.  Going to work at [employer] exercise; will message if wants CDE referral for CGM and tirzepatide  Plan: Lipid panel reflex to direct LDL Non-fasting,         Hemoglobin A1c    (F33.42) Recurrent major depressive disorder, in full remission  Comment: Doing ok with this- PHQ9 score very low despite large amounts of stress in life right now with aging parents, work, and parenting 2 teen kids.    (E78.5) Hyperlipidemia LDL goal <70  Comment: Very well controlled- cont current meds.    (G47.33) ARACELIS (obstructive sleep apnea)  Comment: Wearing CPAP regularly, seems like it works but still waking up some mornings not refreshed and having afternoon fatigue.    (I10) Essential hypertension  Comment: Very well controlled- cont current meds.           2/23/2024     1:43 PM 10/4/2024     9:17 AM 4/8/2025     3:51 PM   PHQ   PHQ-9 Total Score 2 1 2    Q9: Thoughts of better off dead/self-harm past 2 weeks Not at all Not at all Not at all       Patient-reported          BMI  Estimated body mass index is 36.39 kg/m  as calculated from the following:    Height as of this encounter: 1.745 m (5' 8.7\").    Weight as of this encounter: 110.8 kg (244 lb 4.8 oz).   Weight management plan: Discussed healthy diet and exercise guidelines      Patient Instructions   [employer]  Mounjaro  (tirzepatide)     Let me know if you want to go back on tirzepatide before I see you next- a cheaper way may be through [place] [facility].      Shy Almonte is a 50 year old, presenting for the following health issues:  Follow Up and Diabetes        4/8/2025     3:58 PM   Additional Questions   Roomed by torres   Accompanied by self     History of Present Illness       Diabetes:   He presents for follow up of diabetes.  He is checking home blood glucose a few " times a month.   He checks blood glucose before meals.  Blood glucose is sometimes over 200 and never under 70. He is aware of hypoglycemia symptoms including lethargy and confusion.   He is concerned about other.   He is having weight gain.            He eats 2-3 servings of fruits and vegetables daily.He consumes 0 sweetened beverage(s) daily.He exercises with enough effort to increase his heart rate 20 to 29 minutes per day.  He exercises with enough effort to increase his heart rate 3 or less days per week.   He is taking medications regularly.        Tirzepatide was too expensive- had to stop taking it.  Weight up to 240 lbs from 220 lbs.  Has been off for 2 weeks.    Still have drop in energy in the afternoon.  If goes to bed at 8:30-9 pm wakes up feeling refreshed- but kind of rare.    Planning to get back to weight lifting and being active.    Doing intermittent fasting still- waits to eat until 1 pm, tries to stop eating after 8 pm- that has been going well.    Still using CPAP.   Had episode of chest pain early March- not exertional.    Diabetes Follow-up    How often are you checking your blood sugar? Two times daily  Blood sugar testing frequency justification:  Patient modifying lifestyle changes (diet, exercise) with blood sugars  What time of day are you checking your blood sugars (select all that apply)?  Before meals  Glucoses ranging 2/5- 114, 130; 2/8- 138; 2/9- 111, 2/10- 115; 2/11- 111, 129; 2/13- 101, 117; 2/14- 126; 2/15- 105, 115, 120; 2/16- 137; 2/17- 126, 109, 150; 2/19- 120, 133, 150; 2/20- 111, 138; 2/22- 193, 165  Have you had any blood sugars above 200?  No  Have you had any blood sugars below 70?  No  What symptoms do you notice when your blood sugar is low?  Lethargy  What concerns do you have today about your diabetes? None   Do you have any of these symptoms? (Select all that apply)  No numbness or tingling in feet.  No redness, sores or blisters on feet.  No complaints of excessive  "thirst.  No reports of blurry vision.  No significant changes to weight.          Hyperlipidemia Follow-Up    Are you regularly taking any medication or supplement to lower your cholesterol?   Yes-    Are you having muscle aches or other side effects that you think could be caused by your cholesterol lowering medication?  No    Hypertension Follow-up    Do you check your blood pressure regularly outside of the clinic? No   Are you following a low salt diet? Yes  Are your blood pressures ever more than 140 on the top number (systolic) OR more   than 90 on the bottom number (diastolic), for example 140/90? No    BP Readings from Last 2 Encounters:   04/08/25 119/77   10/04/24 117/74     Hemoglobin A1C (%)   Date Value   04/08/2025 5.4   02/11/2025 5.3   03/05/2012 5.1     LDL Cholesterol Calculated (mg/dL)   Date Value   10/04/2024 45   07/22/2024 39   09/29/2017 63   07/13/2016 61     How many servings of fruits and vegetables do you eat daily?  2-3  On average, how many sweetened beverages do you drink each day (Examples: soda, juice, sweet tea, etc.  Do NOT count diet or artificially sweetened beverages)?   0  How many days per week do you exercise enough to make your heart beat faster? 7- really only weekends to increase heart rate  How many minutes a day do you exercise enough to make your heart beat faster? 20 - 29 walking; exercising on weekends really  How many days per week do you miss taking your medication? 0          Objective    /77   Pulse 80   Temp 98.1  F (36.7  C) (Temporal)   Resp 18   Ht 1.745 m (5' 8.7\")   Wt 110.8 kg (244 lb 4.8 oz)   SpO2 100%   BMI 36.39 kg/m    Body mass index is 36.39 kg/m .  Physical Exam   GENERAL: alert and no distress  RESP: lungs clear to auscultation - no rales, rhonchi or wheezes  CV: regular rate and rhythm, normal S1 S2, no S3 or S4, no murmur, click or rub, no peripheral edema  MS: no gross musculoskeletal defects noted, no edema  PSYCH: mentation " appears normal, affect normal/bright            Signed Electronically by: Idania Nolan, DO

## 2025-04-08 NOTE — PATIENT INSTRUCTIONS
[employer]  Mounjaro  (tirzepatide)     Let me know if you want to go back on tirzepatide before I see you next- a cheaper way may be through [facility].

## 2025-04-09 ENCOUNTER — MYC MEDICAL ADVICE (OUTPATIENT)
Dept: FAMILY MEDICINE | Facility: CLINIC | Age: 50
End: 2025-04-09

## 2025-04-09 LAB
CHOLEST SERPL-MCNC: 150 MG/DL
FASTING STATUS PATIENT QL REPORTED: NO
HDLC SERPL-MCNC: 26 MG/DL
LDLC SERPL CALC-MCNC: ABNORMAL MG/DL
LDLC SERPL DIRECT ASSAY-MCNC: 68 MG/DL
NONHDLC SERPL-MCNC: 124 MG/DL
TRIGL SERPL-MCNC: 411 MG/DL

## 2025-04-10 NOTE — RESULT ENCOUNTER NOTE
Dear Gage,    Your results look overall really good. Your HDL is low as usual (likely genetics is a big part)- so I recommend doing your best to increase physical activity.  Your triglycerides were higher than they've been recently- that is the part of the panel that is most affected by fasting.  I don't feel like we need to repeat this now, but the next time we recheck labs in 3-6 months, I recommend those be fasting and I put the lab order in in case you want to get them done before I see you next.    I recommend continuing with the current plan.    Kind regards,    Idania Nolan, DO  Internal Medicine - Pediatrics Physician  [facility]

## 2025-04-11 NOTE — TELEPHONE ENCOUNTER
Rosette: replied to patient; not sure how to interpret this.    I reviewed the test results.  They didn t look good especially cholesterol and triglycerides.  I was honest at my appt, especially being lethargic between 4:30 and 6 but honestly I can t stay awake.  Dr LEPE, you are an amazing doctor and trustworthy but I don t think I ll get better given my bouts of numbness and pain.  I want the care team to know I appreciate how they tried  to care for me if that needs to be shared with my family. I m okay right now.     Emily LAGUERRE BSN, PHN, RN-[facility]  [phone]

## 2025-04-14 ENCOUNTER — VIRTUAL VISIT (OUTPATIENT)
Dept: FAMILY MEDICINE | Facility: CLINIC | Age: 50
End: 2025-04-14
Payer: COMMERCIAL

## 2025-04-14 DIAGNOSIS — E78.5 HYPERLIPIDEMIA LDL GOAL <70: ICD-10-CM

## 2025-04-14 DIAGNOSIS — Z59.9 FINANCIAL DIFFICULTY: ICD-10-CM

## 2025-04-14 DIAGNOSIS — R53.83 OTHER FATIGUE: Primary | ICD-10-CM

## 2025-04-14 DIAGNOSIS — G47.33 OSA (OBSTRUCTIVE SLEEP APNEA): ICD-10-CM

## 2025-04-14 DIAGNOSIS — E78.6 LOW HDL (UNDER 40): ICD-10-CM

## 2025-04-14 PROCEDURE — 98016 BRIEF COMUNICAJ TECH-BSD SVC: CPT | Performed by: INTERNAL MEDICINE

## 2025-04-14 SDOH — ECONOMIC STABILITY - INCOME SECURITY: PROBLEM RELATED TO HOUSING AND ECONOMIC CIRCUMSTANCES, UNSPECIFIED: Z59.9

## 2025-04-14 NOTE — TELEPHONE ENCOUNTER
Left message for patient to call back and ask to speak with an available triage nurse. Also sent reply to patient via [employer].    JARROD FongN, PHN, AMB-BC (she/her)  [facility] RN

## 2025-04-14 NOTE — PROGRESS NOTES
Gage is a 50 year old who is being evaluated via a billable telephone visit.    What phone number would you like to be contacted at? Cell number  How would you like to obtain your AVS? MyChart  Originating Location (pt. Location): Home    Distant Location (provider location):  On-site  Telephone visit completed due to the patient did not have access to video, while the distant provider did.    Assessment & Plan     (R53.83) Other fatigue  (primary encounter diagnosis)  Comment: Check thyroid levels- dad with hypothyroidism.  Plan: TSH with free T4 reflex    (G47.33) ARACELIS (obstructive sleep apnea)  Comment: Using CPAP regularly, waking up refreshed but very tired.    (E78.6) Low HDL (under 40)  Comment: Longstanding, likely genetic, working on increasing exercise.    (E78.5) Hyperlipidemia LDL goal <70  Comment: Cont atorvastatin 20 mg daily.  LDL well controlled.    (Z59.9) Financial difficulty  Comment: Care coordinator referral.  Plan: Primary Care - Care Coordination Referral               There are no Patient Instructions on file for this visit.    Subjective   Gage is a 50 year old, presenting for the following health issues:  No chief complaint on file.        4/8/2025     3:58 PM   Additional Questions   Roomed by torres   Accompanied by self     HPI      Received MyC message from patient about still feeling tired at end of day.  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  Wearing CPAP regularly but still feeling tired at the end of the day.  Walks regularly, but feeling really too fatigued to add lifting.    Does not feel like mood is abnormally low- no thoughts of wanting to hurt himself.  But going through separation from his wife.         Objective           Vitals:  No vitals were obtained today due to virtual visit.    Physical Exam   General: Alert and no distress //Respiratory: No audible wheeze, cough, or shortness of breath // Psychiatric:  Appropriate affect, tone, and pace of words         Phone call duration: 9 minutes  Signed Electronically by: Idania Nolan DO

## 2025-04-14 NOTE — TELEPHONE ENCOUNTER
I'm a little concerned with this message from him- can you please check in and ok to DB him in lunch slots for phone visit with me.  Any has also worked with him previously if he wants to meet with him.   But I believe him in his symptoms and want to help him feel better.    Thank you!     Idania Nolan, DO  Internal Medicine - Pediatrics Physician  [facility]

## 2025-04-14 NOTE — TELEPHONE ENCOUNTER
Pt called back.  PT made the DB appt with Dr. Rosette Nolan for today.  Angel Neri RN  [facility]/ [phone]

## 2025-04-15 ENCOUNTER — PATIENT OUTREACH (OUTPATIENT)
Dept: CARE COORDINATION | Facility: CLINIC | Age: 50
End: 2025-04-15
Payer: COMMERCIAL

## 2025-04-15 NOTE — PROGRESS NOTES
Clinic Care Coordination Contact  Community Health Worker Initial Outreach    CHW Initial Information Gathering:  Referral Source: PCP  Preferred Hospital: [facility]  [phone]  Preferred Urgent Care: [facility], [phone]  Current living arrangement:: I live in a private home with family  Type of residence:: Private home - stairs  Community Resources: None  Equipment Currently Used at Home: none  Informal Support system:: Family, Friends  Transportation means:: Regular car  CHW Additional Questions  If ED/Hospital discharge, follow-up appointment scheduled as recommended?: N/A  Medication changes made following ED/Hospital discharge?: N/A  MyChart active?: Yes    CHW introduced self and role. Patient reports he is interested In enrolling in CC. Patient reports he would like resources to assists with paying family medical bills. Patient reports he is also needing resources regarding refinancing home.     Patient accepts CC: Yes. Patient scheduled for assessment with [facility] on 04/17/2025 at 2:00PM. Patient noted desire to discuss financial support with past medical bills for family and refinancing home.     Eli [facility]avezQu[facility] Health Health Worker   [facility] Care Coordination  [email or web address]  Office: [phone]

## 2025-04-16 NOTE — TELEPHONE ENCOUNTER
See separate chain- lab couldn't add on so needs to come in for lab only appointment.    Thank you!     Idania Nolan, DO  Internal Medicine - Pediatrics Physician  [facility]

## 2025-04-16 NOTE — TELEPHONE ENCOUNTER
Writer reviewed and needs advisement  Per review patient was encouraged to complete TSH-per MyC he is under assumption this was added to earlier April labs.   Review of lab tab TSH is future-thanks!

## 2025-04-26 LAB — TSH SERPL DL<=0.005 MIU/L-ACNC: 2.99 UIU/ML (ref 0.3–4.2)

## 2025-04-28 ENCOUNTER — PATIENT OUTREACH (OUTPATIENT)
Dept: CARE COORDINATION | Facility: CLINIC | Age: 50
End: 2025-04-28
Payer: COMMERCIAL

## 2025-04-28 NOTE — PROGRESS NOTES
Frw update  4/28/25: frw called pt on referral. Pt wanted the [facility] application emailed to him. Frw emailed application. Pt was going to pay some of the bill on his HSA. No further FRW needs.

## 2025-05-15 ENCOUNTER — PATIENT OUTREACH (OUTPATIENT)
Dept: CARE COORDINATION | Facility: CLINIC | Age: 50
End: 2025-05-15
Payer: COMMERCIAL

## 2025-05-15 NOTE — PROGRESS NOTES
Clinic Care Coordination Contact  Follow Up Progress Note      Assessment: MARIO PATEL called patient on this date to complete monthly follow-up. Patient answered phone and voiced he's doing okay. Patient voiced he's currently waiting for his son to get done with his haircut, but can still talk.    Patient voiced that he tried reaching out to someone for refinancing his house, but his wife put the brakes on that. Wife was worried that it would take a hit on their credit score, but patient voiced it won't since they didn't provide social security number. Patient voiced he will try calling that person with refinancing back. Patient believes that person was Naida. 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 Patient voiced that school is done May 30th.    Patient didn't recall talking to FRW about [facility]. MARIO PATEL voiced that MARIO PATEL did see a note on Epic from Thao who indicated they talked and she sent application via email. Patient voiced he will look into this.    Patient voiced no questions at this time, but did request a call in a month. Patient thanked MARIO PATEL for listening and being kind.    MARIO PATEL will follow-up in a month and as needed.       Care Gaps:    There are no preventive care reminders to display for this patient.    Currently there are no Care Gaps.    Care Plans  Care Plan: Financial Wellbeing       Problem: Patient expresses financial resource strain       Goal: Create an action plan to increase financial stability       Start Date: 4/24/2025 Expected End Date: 7/24/2025    Recent Progress: 10%    Priority: Medium    Note:     Barriers: Work   Strengths: Motivated and willing to accept Care Coordination  Patient expressed understanding of goal: Yes  Action steps to achieve this goal:  I will work with Financial Resource Worker (FRW) on applying for [facility]. (5/15: FRW emailed [facility] [employer] Application via email. Patient will look  into application)  I will review resources that Care Coordinator provided including information about financial counseling. (5/15: Reached out to someone for re-financing)   I will reach out to Care Coordinator with questions or concerns.                                 Intervention/Education provided during outreach: MARIO CC discussed FRW and [facility] [employer] application. MARIO CC provided active listening during conversation.      Outreach Frequency: monthly, more frequently as needed    Plan: Patient will follow-up on [facility] [employer] application and resources. Patient will reach out to MARIO CC with questions and concerns.     Care Coordinator will follow up in one month and as needed.    Jessica Frazier [facility] Care[facility]  Social Work Care Coordinator  [facility]  [email or web address]  Office: [phone]  Gender pronouns: she/her

## 2025-06-05 RX ORDER — LANCETS
EACH MISCELLANEOUS
Qty: 100 EACH | Refills: 11 | OUTPATIENT
Start: 2025-06-05

## 2025-06-17 ENCOUNTER — PATIENT OUTREACH (OUTPATIENT)
Dept: CARE COORDINATION | Facility: CLINIC | Age: 50
End: 2025-06-17
Payer: COMMERCIAL

## 2025-06-17 NOTE — PROGRESS NOTES
Clinic Care Coordination Contact  [facility]/Voicemail    Clinical Data: Care Coordinator Outreach    Outreach Documentation Number of Outreach Attempt   6/17/2025   3:42 PM 1       Left message on patient's voicemail with call back information and requested return call.      Plan: Care Coordinator will try to reach patient again in 10 business days.    DEEPA Broussard  Social Work Care Coordinator  [facility]  [email or web address]  Office: [phone]  Gender pronouns: she/her

## 2025-07-26 ENCOUNTER — HEALTH MAINTENANCE LETTER (OUTPATIENT)
Age: 50
End: 2025-07-26

## 2025-08-04 ENCOUNTER — MYC REFILL (OUTPATIENT)
Dept: FAMILY MEDICINE | Facility: CLINIC | Age: 50
End: 2025-08-04
Payer: COMMERCIAL

## 2025-08-04 DIAGNOSIS — E78.5 HYPERLIPIDEMIA LDL GOAL <70: ICD-10-CM

## 2025-08-04 DIAGNOSIS — I10 ESSENTIAL HYPERTENSION: ICD-10-CM

## 2025-08-04 DIAGNOSIS — E11.9 TYPE 2 DIABETES MELLITUS WITHOUT COMPLICATION, WITHOUT LONG-TERM CURRENT USE OF INSULIN (H): ICD-10-CM

## 2025-08-04 RX ORDER — METFORMIN HYDROCHLORIDE 500 MG/1
1000 TABLET, EXTENDED RELEASE ORAL 2 TIMES DAILY WITH MEALS
Qty: 120 TABLET | Refills: 11 | Status: SHIPPED | OUTPATIENT
Start: 2025-08-04

## 2025-08-04 RX ORDER — LISINOPRIL 20 MG/1
20 TABLET ORAL DAILY
Qty: 90 TABLET | Refills: 3 | Status: SHIPPED | OUTPATIENT
Start: 2025-08-04

## 2025-08-04 RX ORDER — ATORVASTATIN CALCIUM 20 MG/1
20 TABLET, FILM COATED ORAL DAILY
Qty: 90 TABLET | Refills: 3 | Status: SHIPPED | OUTPATIENT
Start: 2025-08-04

## 2025-08-04 RX ORDER — METFORMIN HYDROCHLORIDE 500 MG/1
1000 TABLET, EXTENDED RELEASE ORAL 2 TIMES DAILY WITH MEALS
Qty: 120 TABLET | Refills: 11 | OUTPATIENT
Start: 2025-08-04